# Patient Record
Sex: MALE | Race: WHITE | NOT HISPANIC OR LATINO | Employment: FULL TIME | URBAN - METROPOLITAN AREA
[De-identification: names, ages, dates, MRNs, and addresses within clinical notes are randomized per-mention and may not be internally consistent; named-entity substitution may affect disease eponyms.]

---

## 2018-05-07 ENCOUNTER — OFFICE VISIT CONVERTED (OUTPATIENT)
Dept: FAMILY MEDICINE CLINIC | Facility: CLINIC | Age: 53
End: 2018-05-07
Attending: FAMILY MEDICINE

## 2018-11-13 ENCOUNTER — OFFICE VISIT CONVERTED (OUTPATIENT)
Dept: FAMILY MEDICINE CLINIC | Facility: CLINIC | Age: 53
End: 2018-11-13
Attending: FAMILY MEDICINE

## 2018-12-21 ENCOUNTER — OFFICE VISIT CONVERTED (OUTPATIENT)
Dept: FAMILY MEDICINE CLINIC | Facility: CLINIC | Age: 53
End: 2018-12-21
Attending: FAMILY MEDICINE

## 2019-05-20 ENCOUNTER — OFFICE VISIT CONVERTED (OUTPATIENT)
Dept: FAMILY MEDICINE CLINIC | Facility: CLINIC | Age: 54
End: 2019-05-20
Attending: FAMILY MEDICINE

## 2019-05-20 ENCOUNTER — HOSPITAL ENCOUNTER (OUTPATIENT)
Dept: FAMILY MEDICINE CLINIC | Facility: CLINIC | Age: 54
Discharge: HOME | End: 2019-05-20

## 2019-05-20 LAB
ALBUMIN SERPL-MCNC: 4.3 G/DL (ref 3.5–5)
ALBUMIN/GLOB SERPL: 1.8 {RATIO} (ref 1.4–2.6)
ALP SERPL-CCNC: 85 U/L (ref 56–119)
ALT SERPL-CCNC: 18 U/L (ref 10–40)
ANION GAP SERPL CALC-SCNC: 19 MMOL/L (ref 8–19)
AST SERPL-CCNC: 18 U/L (ref 15–50)
BILIRUB SERPL-MCNC: 0.26 MG/DL (ref 0.2–1.3)
BUN SERPL-MCNC: 11 MG/DL (ref 5–25)
BUN/CREAT SERPL: 11 {RATIO} (ref 6–20)
CALCIUM SERPL-MCNC: 9.1 MG/DL (ref 8.7–10.4)
CHLORIDE SERPL-SCNC: 102 MMOL/L (ref 99–111)
CHOLEST SERPL-MCNC: 153 MG/DL (ref 107–200)
CHOLEST/HDLC SERPL: 3.4 {RATIO} (ref 3–6)
CONV CO2: 23 MMOL/L (ref 22–32)
CONV TOTAL PROTEIN: 6.7 G/DL (ref 6.3–8.2)
CREAT UR-MCNC: 1 MG/DL (ref 0.7–1.2)
GFR SERPLBLD BASED ON 1.73 SQ M-ARVRAT: >60 ML/MIN/{1.73_M2}
GLOBULIN UR ELPH-MCNC: 2.4 G/DL (ref 2–3.5)
GLUCOSE SERPL-MCNC: 105 MG/DL (ref 70–99)
HDLC SERPL-MCNC: 45 MG/DL (ref 40–60)
LDLC SERPL CALC-MCNC: 94 MG/DL (ref 70–100)
OSMOLALITY SERPL CALC.SUM OF ELEC: 290 MOSM/KG (ref 273–304)
POTASSIUM SERPL-SCNC: 4.2 MMOL/L (ref 3.5–5.3)
PSA SERPL-MCNC: 0.4 NG/ML (ref 0–4)
SODIUM SERPL-SCNC: 140 MMOL/L (ref 135–147)
TRIGL SERPL-MCNC: 71 MG/DL (ref 40–150)
VLDLC SERPL-MCNC: 14 MG/DL (ref 5–37)

## 2020-01-07 ENCOUNTER — CONVERSION ENCOUNTER (OUTPATIENT)
Dept: FAMILY MEDICINE CLINIC | Facility: CLINIC | Age: 55
End: 2020-01-07

## 2020-01-07 ENCOUNTER — OFFICE VISIT CONVERTED (OUTPATIENT)
Dept: FAMILY MEDICINE CLINIC | Facility: CLINIC | Age: 55
End: 2020-01-07
Attending: FAMILY MEDICINE

## 2020-10-02 ENCOUNTER — HOSPITAL ENCOUNTER (OUTPATIENT)
Dept: FAMILY MEDICINE CLINIC | Facility: CLINIC | Age: 55
Discharge: HOME | End: 2020-10-02
Attending: FAMILY MEDICINE

## 2020-10-02 ENCOUNTER — OFFICE VISIT CONVERTED (OUTPATIENT)
Dept: FAMILY MEDICINE CLINIC | Facility: CLINIC | Age: 55
End: 2020-10-02
Attending: FAMILY MEDICINE

## 2020-10-02 LAB
ALBUMIN SERPL-MCNC: 4.6 G/DL (ref 3.5–5)
ALBUMIN/GLOB SERPL: 1.8 {RATIO} (ref 1.4–2.6)
ALP SERPL-CCNC: 81 U/L (ref 56–119)
ALT SERPL-CCNC: 17 U/L (ref 10–40)
ANION GAP SERPL CALC-SCNC: 23 MMOL/L (ref 8–19)
AST SERPL-CCNC: 18 U/L (ref 15–50)
BASOPHILS # BLD AUTO: 0.05 10*3/UL (ref 0–0.2)
BASOPHILS # BLD: 1 % (ref 0–3)
BASOPHILS NFR BLD AUTO: 0.9 % (ref 0–3)
BILIRUB SERPL-MCNC: 0.21 MG/DL (ref 0.2–1.3)
BUN SERPL-MCNC: 14 MG/DL (ref 5–25)
BUN/CREAT SERPL: 13 {RATIO} (ref 6–20)
CALCIUM SERPL-MCNC: 9.4 MG/DL (ref 8.7–10.4)
CHLORIDE SERPL-SCNC: 100 MMOL/L (ref 99–111)
CHOLEST SERPL-MCNC: 171 MG/DL (ref 107–200)
CHOLEST/HDLC SERPL: 3.4 {RATIO} (ref 3–6)
CONV ABS BANDS: 0 % (ref 1–5)
CONV ABS IMM GRAN: 0.02 10*3/UL (ref 0–0.2)
CONV CO2: 22 MMOL/L (ref 22–32)
CONV IMMATURE GRAN: 0.4 % (ref 0–1.8)
CONV SEGMENTED NEUTROPHILS: 71 % (ref 45–70)
CONV TOTAL PROTEIN: 7.2 G/DL (ref 6.3–8.2)
CREAT UR-MCNC: 1.09 MG/DL (ref 0.7–1.2)
DEPRECATED RDW RBC AUTO: 43.9 FL (ref 35.1–43.9)
EOSINOPHIL # BLD AUTO: 0.22 10*3/UL (ref 0–0.7)
EOSINOPHIL # BLD AUTO: 4.1 % (ref 0–7)
EOSINOPHIL NFR BLD AUTO: 2 % (ref 0–7)
ERYTHROCYTE [DISTWIDTH] IN BLOOD BY AUTOMATED COUNT: 12.9 % (ref 11.6–14.4)
EST. AVERAGE GLUCOSE BLD GHB EST-MCNC: 111 MG/DL
GFR SERPLBLD BASED ON 1.73 SQ M-ARVRAT: >60 ML/MIN/{1.73_M2}
GLOBULIN UR ELPH-MCNC: 2.6 G/DL (ref 2–3.5)
GLUCOSE SERPL-MCNC: 100 MG/DL (ref 70–99)
HBA1C MFR BLD: 5.5 % (ref 3.5–5.7)
HCT VFR BLD AUTO: 45.9 % (ref 42–52)
HDLC SERPL-MCNC: 51 MG/DL (ref 40–60)
HGB BLD-MCNC: 14.7 G/DL (ref 14–18)
LDLC SERPL CALC-MCNC: 104 MG/DL (ref 70–100)
LYMPHOCYTES # BLD AUTO: 1.14 10*3/UL (ref 1–5)
LYMPHOCYTES NFR BLD AUTO: 21 % (ref 20–45)
MCH RBC QN AUTO: 29.8 PG (ref 27–31)
MCHC RBC AUTO-ENTMCNC: 32 G/DL (ref 33–37)
MCV RBC AUTO: 92.9 FL (ref 80–96)
MONOCYTES # BLD AUTO: 0.38 10*3/UL (ref 0.2–1.2)
MONOCYTES NFR BLD AUTO: 7 % (ref 3–10)
MONOCYTES NFR BLD MANUAL: 0 % (ref 3–10)
NEUTROPHILS # BLD AUTO: 3.61 10*3/UL (ref 2–8)
NEUTROPHILS NFR BLD AUTO: 66.6 % (ref 30–85)
NRBC CBCN: 0 % (ref 0–0.7)
NUC CELL # PRT MANUAL: 0 /100{WBCS}
OSMOLALITY SERPL CALC.SUM OF ELEC: 293 MOSM/KG (ref 273–304)
PLAT MORPH BLD: NORMAL
PLATELET # BLD AUTO: 190 10*3/UL (ref 130–400)
PMV BLD AUTO: 10.2 FL (ref 9.4–12.4)
POTASSIUM SERPL-SCNC: 4.2 MMOL/L (ref 3.5–5.3)
PSA SERPL-MCNC: 0.48 NG/ML (ref 0–4)
RBC # BLD AUTO: 4.94 10*6/UL (ref 4.7–6.1)
SMALL PLATELETS BLD QL SMEAR: ADEQUATE
SODIUM SERPL-SCNC: 141 MMOL/L (ref 135–147)
TRIGL SERPL-MCNC: 79 MG/DL (ref 40–150)
TSH SERPL-ACNC: 2.05 M[IU]/L (ref 0.27–4.2)
VARIANT LYMPHS NFR BLD MANUAL: 26 % (ref 20–45)
VLDLC SERPL-MCNC: 16 MG/DL (ref 5–37)
WBC # BLD AUTO: 5.42 10*3/UL (ref 4.8–10.8)

## 2020-10-03 LAB — HCV AB SER DONR QL: <0.1 S/CO RATIO (ref 0–0.9)

## 2020-12-16 ENCOUNTER — OFFICE VISIT CONVERTED (OUTPATIENT)
Dept: FAMILY MEDICINE CLINIC | Facility: CLINIC | Age: 55
End: 2020-12-16
Attending: FAMILY MEDICINE

## 2020-12-16 ENCOUNTER — HOSPITAL ENCOUNTER (OUTPATIENT)
Dept: FAMILY MEDICINE CLINIC | Facility: CLINIC | Age: 55
Discharge: HOME | End: 2020-12-16
Attending: FAMILY MEDICINE

## 2021-01-06 ENCOUNTER — CONVERSION ENCOUNTER (OUTPATIENT)
Dept: ORTHOPEDIC SURGERY | Facility: CLINIC | Age: 56
End: 2021-01-06

## 2021-01-06 ENCOUNTER — OFFICE VISIT CONVERTED (OUTPATIENT)
Dept: ORTHOPEDIC SURGERY | Facility: CLINIC | Age: 56
End: 2021-01-06
Attending: ORTHOPAEDIC SURGERY

## 2021-05-09 VITALS
WEIGHT: 200 LBS | HEART RATE: 90 BPM | DIASTOLIC BLOOD PRESSURE: 80 MMHG | SYSTOLIC BLOOD PRESSURE: 120 MMHG | TEMPERATURE: 97.9 F | OXYGEN SATURATION: 97 %

## 2021-05-09 VITALS
WEIGHT: 188 LBS | HEART RATE: 87 BPM | DIASTOLIC BLOOD PRESSURE: 84 MMHG | OXYGEN SATURATION: 96 % | SYSTOLIC BLOOD PRESSURE: 116 MMHG | TEMPERATURE: 97.6 F

## 2021-05-09 VITALS
DIASTOLIC BLOOD PRESSURE: 80 MMHG | HEART RATE: 83 BPM | OXYGEN SATURATION: 97 % | SYSTOLIC BLOOD PRESSURE: 130 MMHG | HEIGHT: 71 IN | WEIGHT: 190.25 LBS | BODY MASS INDEX: 26.64 KG/M2 | TEMPERATURE: 97.8 F

## 2021-05-09 VITALS
OXYGEN SATURATION: 96 % | WEIGHT: 198.12 LBS | HEIGHT: 71 IN | HEART RATE: 70 BPM | BODY MASS INDEX: 27.74 KG/M2 | DIASTOLIC BLOOD PRESSURE: 90 MMHG | TEMPERATURE: 97.7 F | SYSTOLIC BLOOD PRESSURE: 140 MMHG

## 2021-05-09 VITALS
SYSTOLIC BLOOD PRESSURE: 110 MMHG | OXYGEN SATURATION: 97 % | TEMPERATURE: 97.1 F | WEIGHT: 200 LBS | DIASTOLIC BLOOD PRESSURE: 70 MMHG | HEART RATE: 80 BPM

## 2021-05-09 VITALS
OXYGEN SATURATION: 97 % | HEART RATE: 106 BPM | TEMPERATURE: 98 F | SYSTOLIC BLOOD PRESSURE: 140 MMHG | WEIGHT: 199.12 LBS | DIASTOLIC BLOOD PRESSURE: 86 MMHG

## 2021-05-09 VITALS
DIASTOLIC BLOOD PRESSURE: 86 MMHG | TEMPERATURE: 97.6 F | OXYGEN SATURATION: 98 % | SYSTOLIC BLOOD PRESSURE: 132 MMHG | WEIGHT: 198 LBS | HEART RATE: 88 BPM

## 2021-05-10 NOTE — H&P
"   History and Physical      Patient Name: Pavan Perales   Patient ID: [id number]   Sex: Male   YOB: 1965    Primary Care Provider: Maria Esther Zavala MD   Referring Provider: Maria Esther Zavala MD    Visit Date: January 6, 2021    Provider: Marek Gaviria MD   Location: [facility]   Location Address: [address] [place] [address]  [id number]   Location Phone: [phone]          Chief Complaint  · Right Elbow Pain      History Of Present Illness  Pavan Perales is a 55 year old male who presents today to [facility].      Patient presents today for an evaluation of right elbow pain. Patient states he had a fall about 1 month ago resulting in right elbow pain. He has an olecranon bursitis on his elbow and states that it has only gotten \"a little bit smaller\" since sustaining right elbow injury. He denies any further complaints today.       Past Medical History  Hypertension; Pain: Elbow         Past Surgical History  *No Past Surgical History         Medication List  cyclobenzaprine 10 mg oral tablet; Norvasc 5 mg oral tablet; omeprazole 20 mg oral capsule,delayed release(DR/EC); trazodone 100 mg oral tablet         Allergy List  NO KNOWN DRUG ALLERGIES       Allergies Reconciled  Family Medical History  Hypertension         Social History  Tobacco (Former)         Review of Systems  · Constitutional  o Denies  o : fever, chills, weight loss  · Cardiovascular  o Denies  o : chest pain, shortness of breath  · Gastrointestinal  o Denies  o : liver disease, heartburn, nausea, blood in stools  · Genitourinary  o Denies  o : painful urination, blood in urine  · Integument  o Denies  o : rash, itching  · Neurologic  o Denies  o : headache, weakness, loss of consciousness  · Musculoskeletal  o Denies  o : painful, swollen joints  · Psychiatric  o Denies  o : drug/alcohol addiction, anxiety, depression      Vitals  Date Time BP Position Site L\R Cuff Size HR RR TEMP (F) WT  HT  " BMI kg/m2 BSA m2 O2 Sat FR L/min FiO2 HC       01/06/2021 09:07 AM      89 - R   190lbs 0oz 6'   25.77 2.09 99 %            Physical Examination  · Constitutional  o Appearance  o : well developed, well-nourished, no obvious deformities present  · Head and Face  o Head  o :   § Inspection  § : normocephalic  o Face  o :   § Inspection  § : no facial lesions  · Eyes  o Conjunctivae  o : conjunctivae normal  o Sclerae  o : sclerae white  · Ears, Nose, Mouth and Throat  o Ears  o :   § External Ears  § : appearance within normal limits  § Hearing  § : intact  o Nose  o :   § External Nose  § : appearance normal  · Neck  o Inspection/Palpation  o : normal appearance  o Range of Motion  o : full range of motion  · Respiratory  o Respiratory Effort  o : breathing unlabored  o Inspection of Chest  o : normal appearance  o Auscultation of Lungs  o : no audible wheezing or rales  · Cardiovascular  o Heart  o : regular rate  · Gastrointestinal  o Abdominal Examination  o : soft and non-tender  · Skin and Subcutaneous Tissue  o General Inspection  o : intact, no rashes  · Psychiatric  o General  o : Alert and oriented x3  o Judgement and Insight  o : judgment and insight intact  o Mood and Affect  o : mood normal, affect appropriate  · Right Elbow  o Inspection  o : Sensation grossly intact. Neurovascular intact. Skin intact. Olecranon bursitis on right elbow. No skin discoloration or atrophy. Good flexion and extension of the elbow. Good tone of deltoid, biceps, triceps, wrist extensors, and wrist flexors. Radial pulse 2+, ulnar pulse 2+. Negative Tinel's to the elbow. Non-tender to palpation.   · Injection Note/Aspiration Note  o Site  o : right elbow  o Procedure  o : Procedure: After educating the patient, patient gave consent for procedure. After using Chloraprep, the joint space was aspirated. The patient tolerated the procedure well.  o Medication  o : No medication  · Imaging  o Imaging  o : 12/16/20: Enthesopathic  change in the posterior olecranon. Mineralization seen along the medial epicondyle may reflect chronic medial epicondylitis/flexor tendinopathy. No acute findings.           Assessment  · Right elbow pain     719.42/M25.521  · Olecranon bursitis of right elbow     726.33/M70.21      Plan  · Orders  o Elbow-Lat Single Tendon (Injection) (53116) - - 01/06/2021   Administered by LIS Gaviria MD  · Medications  o Medications have been Reconciled  o Transition of Care or Provider Policy  · Instructions  o Dr. Gaviria saw and examined the patient and agrees with plan.   o X-rays reviewed by Dr. Gaviria.  o Reviewed the patient's Past Medical, Social, and Family history as well as the ROS at today's visit, no changes.  o Call or return if worsening symptoms.  o Follow Up PRN.  o This note was transcribed by Davina Perales. leandra  o Discussed diagnosis and treatment options with the patient. Patient will try conservative management for the time being. If olecranon bursitis bothers him further we will remove it. Patient got olecranon bursitis aspirated.   · Referrals  o ID: [id number] Date: 12/30/2020 Type: Inbound  Specialty: Orthopedic Surgery            Electronically Signed by: Davina Perales-, Other -Author on January 7, 2021 01:51:14 PM  Electronically Co-signed by: Marek Gaviria MD -Reviewer on January 7, 2021 09:39:14 PM

## 2021-05-14 VITALS — OXYGEN SATURATION: 99 % | WEIGHT: 190 LBS | BODY MASS INDEX: 25.73 KG/M2 | HEIGHT: 72 IN | HEART RATE: 89 BPM

## 2021-05-28 ENCOUNTER — OFFICE VISIT CONVERTED (OUTPATIENT)
Dept: FAMILY MEDICINE CLINIC | Facility: CLINIC | Age: 56
End: 2021-05-28
Attending: FAMILY MEDICINE

## 2021-06-05 NOTE — PROGRESS NOTES
Progress Note      Patient Name: Pavan Perales   Patient ID: [id number]   Sex: Male   YOB: 1965    Primary Care Provider: Maite Montoya DO   Referring Provider: Maite Montoya DO    Visit Date: May 28, 2021    Provider: Maite Montoya DO   Location: [facility]   Location Address: [address] [place] [address] [place]  [id number]   Location Phone: [phone]          Chief Complaint     Refills       History Of Present Illness  Pavan Perales is a 56 year old /White male who presents for evaluation and treatment of:      1.) HTN : Stable - 130/70 - no concerns with dose of rx.     2.) GERD : Stable - no new concerns for worsening GERD.     3.) INSOMNIA : Stable - no significant concern regarding rx dose.            Past Medical History  Disease Name Date Onset Notes   Back muscle spasm --  --    GERD (gastroesophageal reflux disease) --  --    History of tuberculosis --  --    Hypertension, Benign Essential --  --    Insomnia, unspecified --  --    Migraines --  --    ROCKY (obstructive sleep apnea) --  No CPAP         Past Surgical History  Procedure Name Date Notes   Low Back Disc --  --    Shoulder surgery --  --          Medication List  Name Date Started Instructions   amlodipine 5 mg oral tablet 05/28/2021 take 1 tablet (5 mg) by oral route once daily for 90 days   cyclobenzaprine 10 mg oral tablet 10/02/2020 take 1 tablet by oral route 3 times a day as needed   Protonix 40 mg oral tablet,delayed release (/EC) 05/28/2021 take 1 tablet by oral route daily for 90 days PRN   trazodone 300 mg oral tablet 05/28/2021 take 1 tablet by oral route once a day (at bedtime) for 90 days         Allergy List  Allergen Name Date Reaction Notes   NO KNOWN DRUG ALLERGIES --  --  --          Family Medical History  Disease Name Relative/Age Notes   Hypertension Father/   Father   Father, Grandfather, or Brother developed Heart Disease before the age of 65  --          Social  History  Finding Status Start/Stop Quantity Notes   Alcohol Light --/-- --  drinks alcohol, 7 or less drinks per week   Exercises regularly --  --/-- --  3-4 times per week   Second hand smoke exposure Unknown --/-- --  yes   Smokeless tobacco Current every day --/-- 1/2 CAN DAILY --    Tobacco Former --/-- --  Former smoker, now dips 1/2 can per day    Uses seatbelts --  --/-- --  yes         Review of Systems  · Constitutional  o Denies  o : fatigue, night sweats  · Eyes  o Denies  o : double vision, blurred vision  · HENT  o Denies  o : vertigo, recent head injury  · Cardiovascular  o Denies  o : chest pain, irregular heart beats  · Respiratory  o Denies  o : shortness of breath, productive cough  · Gastrointestinal  o Denies  o : nausea, vomiting  · Genitourinary  o Denies  o : dysuria, urinary retention  · Integument  o Denies  o : hair growth change, new skin lesions  · Neurologic  o Denies  o : altered mental status, seizures  · Musculoskeletal  o Denies  o : joint swelling, limitation of motion  · Endocrine  o Denies  o : cold intolerance, heat intolerance  · Psychiatric  o Denies  o : hallucinations, delusions  · Heme-Lymph  o Denies  o : petechiae, lymph node enlargement or tenderness  · Allergic-Immunologic  o Denies  o : frequent illnesses      Vitals  Date Time BP Position Site L\R Cuff Size HR RR TEMP (F) WT  HT  BMI kg/m2 BSA m2 O2 Sat FR L/min FiO2        05/28/2021 03:37 /70 Sitting    70 - R  98 190lbs 6oz    96 %            Physical Examination  · Constitutional  o Appearance  o : well developed, well-nourished, in no acute distress  · Head and Face  o HEENT  o : hearing is normal for conversation   · Eyes  o Conjunctivae  o : conjunctivae normal  o Pupils and Irises  o : pupils equal and round  · Neck  o Inspection/Palpation  o : supple  o Thyroid  o : no thyromegaly  · Respiratory  o Respiratory Effort  o : breathing unlabored  o Auscultation of Lungs  o : clear to  ascultation  · Cardiovascular  o Heart  o :   § Auscultation of Heart  § : regular rate and rhythm  o Peripheral Vascular System  o :   § Extremities  § : no edema  · Gastrointestinal  o Abdominal Examination  o :   § Abdomen  § : non-distended   · Lymphatic  o Neck  o : normal size  · Musculoskeletal  o General  o :   § General Musculoskeletal  § : muscle tone grossly intact   · Skin and Subcutaneous Tissue  o General Inspection  o : no lesions present, no areas of discoloration, skin turgor normal, texture normal  · Neurologic  o Gait and Station  o :   § Gait Screening  § : normal gait  · Psychiatric  o Mood and Affect  o : mood normal, affect appropriate          Assessment  · Essential hypertension     401.9/I10  · GERD (gastroesophageal reflux disease)     530.81/K21.9  · Insomnia, unspecified     780.52/G47.00         A.) Stable on all rx - remain on current dosages - refilled - follow up in 6 months - annual labs during that visit.            Plan  · Orders  o ACO-39: Current medications updated and reviewed (1159F, ) - - 05/28/2021  · Medications  o trazodone 300 mg oral tablet   SIG: take 1 tablet by oral route once a day (at bedtime) for 90 days   DISP: (90) Tablet with 1 refills  Adjusted on 05/28/2021     o amlodipine 5 mg oral tablet   SIG: take 1 tablet (5 mg) by oral route once daily for 90 days   DISP: (90) Tablet with 1 refills  Refilled on 05/28/2021     o Protonix 40 mg oral tablet,delayed release (DR/EC)   SIG: take 1 tablet by oral route daily for 90 days PRN   DISP: (90) Tablet with 1 refills  Refilled on 05/28/2021     o Medications have been Reconciled  o Transition of Care or Provider Policy  · Instructions  o Take all medications as prescribed/directed.  o Patient was educated/instructed on their diagnosis, treatment and medications prior to discharge from the clinic today.            Electronically Signed by: Maite Montoya DO - on May 28, 2021 04:08:29 PM

## 2021-07-15 VITALS
WEIGHT: 190.37 LBS | DIASTOLIC BLOOD PRESSURE: 70 MMHG | OXYGEN SATURATION: 96 % | HEART RATE: 70 BPM | TEMPERATURE: 98 F | SYSTOLIC BLOOD PRESSURE: 130 MMHG | BODY MASS INDEX: 25.82 KG/M2

## 2021-12-10 ENCOUNTER — OFFICE VISIT (OUTPATIENT)
Dept: FAMILY MEDICINE CLINIC | Facility: CLINIC | Age: 56
End: 2021-12-10

## 2021-12-10 VITALS
DIASTOLIC BLOOD PRESSURE: 84 MMHG | SYSTOLIC BLOOD PRESSURE: 120 MMHG | HEART RATE: 79 BPM | TEMPERATURE: 97.7 F | WEIGHT: 194 LBS | BODY MASS INDEX: 26.31 KG/M2 | OXYGEN SATURATION: 97 %

## 2021-12-10 DIAGNOSIS — S89.91XA INJURY OF RIGHT KNEE, INITIAL ENCOUNTER: Primary | ICD-10-CM

## 2021-12-10 PROBLEM — M19.90 ARTHRITIS: Status: ACTIVE | Noted: 2021-12-10

## 2021-12-10 PROBLEM — I10 HYPERTENSION: Status: ACTIVE | Noted: 2021-12-10

## 2021-12-10 PROBLEM — C80.1 CANCER: Status: ACTIVE | Noted: 2021-12-10

## 2021-12-10 PROCEDURE — 99213 OFFICE O/P EST LOW 20 MIN: CPT | Performed by: FAMILY MEDICINE

## 2021-12-10 RX ORDER — KETOROLAC TROMETHAMINE 10 MG/1
1 TABLET, FILM COATED ORAL EVERY 6 HOURS PRN
COMMUNITY
Start: 2021-12-09 | End: 2021-12-10

## 2021-12-10 RX ORDER — ACETAMINOPHEN AND CODEINE PHOSPHATE 300; 30 MG/1; MG/1
1 TABLET ORAL EVERY 4 HOURS PRN
Qty: 15 TABLET | Refills: 0 | Status: SHIPPED | OUTPATIENT
Start: 2021-12-10 | End: 2022-02-28

## 2021-12-10 RX ORDER — IBUPROFEN 800 MG/1
800 TABLET ORAL EVERY 6 HOURS PRN
Qty: 45 TABLET | Refills: 1 | Status: SHIPPED | OUTPATIENT
Start: 2021-12-10 | End: 2022-02-28

## 2021-12-10 NOTE — PROGRESS NOTES
Chief Complaint    Knee Pain (ER F/U)    Subjective      Pavan Perales presents to [facility]     History of Present Illness    1.) KNEE PAIN (RIGHT) : ER records not available during visit. Patient presents with edema, ecchymosis and knee pain. Reports a 'twisting' injury that occurred > 24 hours ago. Patient reports that he 'honestly does not remember the exact mechanism of action.' X-ray in ER - advised follow up with primary care for MRI and referral to orthopedic surgery. The patient reports medial laxity of his knee. He is wearing a knee brace, which he reports affords him some stability, if he is being 'careful.'    Objective      Vital Signs:     /84   Pulse 79   Temp 97.7 °F (36.5 °C)   Wt 88 kg (194 lb)   SpO2 97%   BMI 26.31 kg/m²       Physical Exam  Vitals reviewed.   Constitutional:       General: He is not in acute distress.     Appearance: Normal appearance. He is well-developed.   HENT:      Head: Normocephalic and atraumatic.      Right Ear: Hearing and external ear normal.      Left Ear: Hearing and external ear normal.      Nose: Nose normal.   Eyes:      General: Lids are normal.         Right eye: No discharge.         Left eye: No discharge.      Conjunctiva/sclera: Conjunctivae normal.   Pulmonary:      Effort: Pulmonary effort is normal.   Abdominal:      General: There is no distension.   Musculoskeletal:         General: No swelling.      Cervical back: Neck supple.        Legs:       Comments: Edema + ecchymosis appreciated.    Skin:     Coloration: Skin is not jaundiced.      Findings: No erythema.   Neurological:      Mental Status: He is alert. Mental status is at baseline.      Gait: Gait abnormal.   Psychiatric:         Mood and Affect: Mood and affect normal.         Thought Content: Thought content normal.     Assessment and Plan    Diagnoses and all orders for this visit:    1. Injury of right knee, initial encounter (Primary)  -      Ambulatory Referral to Orthopedic Surgery  -     acetaminophen-codeine (TYLENOL #3) 300-30 MG per tablet; Take 1 tablet by mouth Every 4 (Four) Hours As Needed for Moderate Pain .  Dispense: 15 tablet; Refill: 0    Other orders  -     ibuprofen (ADVIL,MOTRIN) 800 MG tablet; Take 1 tablet by mouth Every 6 (Six) Hours As Needed (right knee pain).  Dispense: 45 tablet; Refill: 1    · Discussion with referral regarding ordering an MRI, which they admit could take weeks to complete. Patient will be referred to orthopedic surgery. Pain control with Ibuprofen + Tylenol #3 PRN as noted below. Crutches provided - patient advised not to bear weight on right lower extremity.    Patient was given instructions and counseling regarding his condition or for health maintenance advice. Please see specific information pulled into the AVS if appropriate.

## 2021-12-16 ENCOUNTER — TRANSCRIBE ORDERS (OUTPATIENT)
Dept: ADMINISTRATIVE | Facility: HOSPITAL | Age: 56
End: 2021-12-16

## 2021-12-16 DIAGNOSIS — M25.561 RIGHT KNEE PAIN, UNSPECIFIED CHRONICITY: Primary | ICD-10-CM

## 2021-12-23 ENCOUNTER — HOSPITAL ENCOUNTER (OUTPATIENT)
Dept: MRI IMAGING | Facility: HOSPITAL | Age: 56
Discharge: HOME | End: 2021-12-23
Admitting: ORTHOPAEDIC SURGERY

## 2021-12-23 DIAGNOSIS — M25.561 RIGHT KNEE PAIN, UNSPECIFIED CHRONICITY: ICD-10-CM

## 2021-12-23 PROCEDURE — 73721 MRI JNT OF LWR EXTRE W/O DYE: CPT

## 2022-02-01 ENCOUNTER — OFFICE VISIT (OUTPATIENT)
Dept: FAMILY MEDICINE CLINIC | Facility: CLINIC | Age: 57
End: 2022-02-01

## 2022-02-01 VITALS
OXYGEN SATURATION: 98 % | HEART RATE: 77 BPM | TEMPERATURE: 98.1 F | DIASTOLIC BLOOD PRESSURE: 80 MMHG | SYSTOLIC BLOOD PRESSURE: 118 MMHG | BODY MASS INDEX: 27.4 KG/M2 | WEIGHT: 202 LBS

## 2022-02-01 DIAGNOSIS — M25.521 RIGHT ELBOW PAIN: Primary | ICD-10-CM

## 2022-02-01 DIAGNOSIS — M25.619 LIMITED RANGE OF MOTION (ROM) OF SHOULDER: ICD-10-CM

## 2022-02-01 PROCEDURE — 99213 OFFICE O/P EST LOW 20 MIN: CPT | Performed by: FAMILY MEDICINE

## 2022-02-01 NOTE — PROGRESS NOTES
Chief Complaint    Shoulder Pain (Right shoulder pain and surgery x 6 months ago. Pain worsening )    Subjective      Pavan Perales presents to [facility]    History of Present Illness    1.) SHOULDER PAIN (RIGHT) : Acute on chronic. Patient admits to several months of pain with torsion within the last 2 weeks. No recent injury. He reports significant decrease in motion especially abduction.  He notes that he has been unable to perform his ADLs secondary to his very limited range of motion and pain. He has been using Aleve with some relief.     Objective     Vital Signs:     /80   Pulse 77   Temp 98.1 °F (36.7 °C)   Wt 91.6 kg (202 lb)   SpO2 98%   BMI 27.40 kg/m²       Physical Exam  Vitals reviewed.   Constitutional:       General: He is not in acute distress.     Appearance: Normal appearance. He is well-developed.   HENT:      Head: Normocephalic and atraumatic.      Right Ear: Hearing and external ear normal.      Left Ear: Hearing and external ear normal.      Nose: Nose normal.   Eyes:      General: Lids are normal.         Right eye: No discharge.         Left eye: No discharge.      Conjunctiva/sclera: Conjunctivae normal.   Pulmonary:      Effort: Pulmonary effort is normal.   Abdominal:      General: There is no distension.   Musculoskeletal:         General: No swelling.      Cervical back: Neck supple.      Comments: Examination of right shoulder : Patient is very limited in the abduction and other planes of range of motion. Tenderness noted with passive supination and pronation. (+) Drop arm test on the right. Tenderness noted with palpation of bicipital groove.   Skin:     Coloration: Skin is not jaundiced.      Findings: No erythema.   Neurological:      Mental Status: He is alert. Mental status is at baseline.   Psychiatric:         Mood and Affect: Mood and affect normal.         Thought Content: Thought content normal.     Assessment and Plan      Diagnoses and all orders for this visit:    1. Right elbow pain (Primary)  Comments:  1.) ? Rotator cuff + bicep tendonitis. MRI ordered as noted. Relative rest. NSAID PRN. Ice. Additional recommendations per review.  Orders:  -     MRI Shoulder Right Without Contrast; Future    2. Limited range of motion (ROM) of shoulder  -     MRI Shoulder Right Without Contrast; Future    Patient was given instructions and counseling regarding his condition or for health maintenance advice. Please see specific information pulled into the AVS if appropriate.

## 2022-02-08 ENCOUNTER — HOSPITAL ENCOUNTER (OUTPATIENT)
Dept: MRI IMAGING | Facility: HOSPITAL | Age: 57
Discharge: HOME | End: 2022-02-08
Admitting: FAMILY MEDICINE

## 2022-02-08 DIAGNOSIS — M25.521 RIGHT ELBOW PAIN: ICD-10-CM

## 2022-02-08 DIAGNOSIS — M75.101 TEAR OF RIGHT SUPRASPINATUS TENDON: Primary | ICD-10-CM

## 2022-02-08 DIAGNOSIS — M25.619 LIMITED RANGE OF MOTION (ROM) OF SHOULDER: ICD-10-CM

## 2022-02-08 DIAGNOSIS — S43.431S SUPERIOR GLENOID LABRUM LESION OF RIGHT SHOULDER, SEQUELA: ICD-10-CM

## 2022-02-08 PROCEDURE — 73221 MRI JOINT UPR EXTREM W/O DYE: CPT

## 2022-02-09 ENCOUNTER — TELEPHONE (OUTPATIENT)
Dept: FAMILY MEDICINE CLINIC | Facility: CLINIC | Age: 57
End: 2022-02-09

## 2022-02-09 ENCOUNTER — TELEPHONE (OUTPATIENT)
Dept: NEUROLOGY | Facility: OTHER | Age: 57
End: 2022-02-09

## 2022-02-09 NOTE — TELEPHONE ENCOUNTER
DR. SIDHU'S OFFICE CALLED TO MAKE EMG APPT. I LET HER KNOW WE NEED ORDER FAXED OVER AND THEN WE COULD SCHEDULE.

## 2022-02-09 NOTE — TELEPHONE ENCOUNTER
So seems that he is should be already aware that he was referred to orthopedic surgery. Is his question in relation to what to do for pain management or work?    Thank you!

## 2022-02-09 NOTE — TELEPHONE ENCOUNTER
Caller: Pavan Perales    Relationship: Self    Best call back number: [phone]    What is the best time to reach you: ANYTIME    Who are you requesting to speak with (clinical staff, provider,  specific staff member): CLINICAL      What was the call regarding: THE PATIENT STATED PCP SENT HIM HIS MRI RESULTS AND HE WOULD LIKE HER RECOMMENDATION ON WHAT TO DO NEXT. HE WOULD LIKE A CALL BACK TO DISCUSS    Do you require a callback: YES

## 2022-02-16 NOTE — TELEPHONE ENCOUNTER
Patient called about referral for emg. I let him know we haven't received order yet from dr. Office but had spoke to them on 2-9-22. I gave him fax for hub so he can call them and have them resend order.

## 2022-02-21 ENCOUNTER — OFFICE VISIT (OUTPATIENT)
Dept: ORTHOPEDIC SURGERY | Facility: CLINIC | Age: 57
End: 2022-02-21

## 2022-02-21 VITALS — HEART RATE: 88 BPM | WEIGHT: 205 LBS | BODY MASS INDEX: 27.77 KG/M2 | OXYGEN SATURATION: 98 % | HEIGHT: 72 IN

## 2022-02-21 DIAGNOSIS — M19.011 ARTHRITIS OF RIGHT ACROMIOCLAVICULAR JOINT: ICD-10-CM

## 2022-02-21 DIAGNOSIS — M75.01 ADHESIVE CAPSULITIS OF RIGHT SHOULDER: Primary | ICD-10-CM

## 2022-02-21 PROCEDURE — 99213 OFFICE O/P EST LOW 20 MIN: CPT | Performed by: ORTHOPAEDIC SURGERY

## 2022-02-21 PROCEDURE — 20610 DRAIN/INJ JOINT/BURSA W/O US: CPT | Performed by: ORTHOPAEDIC SURGERY

## 2022-02-21 RX ORDER — TRIAMCINOLONE ACETONIDE 40 MG/ML
40 INJECTION, SUSPENSION INTRA-ARTICULAR; INTRAMUSCULAR
Status: COMPLETED | OUTPATIENT
Start: 2022-02-21 | End: 2022-02-21

## 2022-02-21 RX ORDER — BUPIVACAINE HYDROCHLORIDE 2.5 MG/ML
5 INJECTION, SOLUTION INFILTRATION; PERINEURAL
Status: COMPLETED | OUTPATIENT
Start: 2022-02-21 | End: 2022-02-21

## 2022-02-21 RX ADMIN — TRIAMCINOLONE ACETONIDE 40 MG: 40 INJECTION, SUSPENSION INTRA-ARTICULAR; INTRAMUSCULAR at 13:36

## 2022-02-21 RX ADMIN — BUPIVACAINE HYDROCHLORIDE 5 ML: 2.5 INJECTION, SOLUTION INFILTRATION; PERINEURAL at 13:36

## 2022-02-21 NOTE — PROGRESS NOTES
"Chief Complaint  Initial Evaluation of the Right Shoulder     Subjective      Pavan Perales presents to [facility] for an evaluation of right shoulder. Patient has a history of surgery on this shoulder. He states pain about the whole shoulder. He noticed limited motion and pain in the shoulder. He believes he injured his shoulder when he injured his knee, this was 2 months ago. He states he has been losing motion for more than 2 months. Patient present today with MRI results.     No Known Allergies     Social History     Socioeconomic History   • Marital status:    Tobacco Use   • Smoking status: Never Smoker   • Smokeless tobacco: Current User   Vaping Use   • Vaping Use: Never used   Substance and Sexual Activity   • Alcohol use: Yes     Comment: RARELY        Review of Systems     Objective   Vital Signs:   Pulse 88   Ht 182.9 cm (72\")   Wt 93 kg (205 lb)   SpO2 98%   BMI 27.80 kg/m²       Physical Exam  Constitutional:       Appearance: Normal appearance. Patient is well-developed and normal weight.   HENT:      Head: Normocephalic.      Right Ear: Hearing and external ear normal.      Left Ear: Hearing and external ear normal.      Nose: Nose normal.   Eyes:      Conjunctiva/sclera: Conjunctivae normal.   Cardiovascular:      Rate and Rhythm: Normal rate.   Pulmonary:      Effort: Pulmonary effort is normal.      Breath sounds: No wheezing or rales.   Abdominal:      Palpations: Abdomen is soft.      Tenderness: There is no abdominal tenderness.   Musculoskeletal:      Cervical back: Normal range of motion.   Skin:     Findings: No rash.   Neurological:      Mental Status: Patient  is alert and oriented to person, place, and time.   Psychiatric:         Mood and Affect: Mood and affect normal.         Judgment: Judgment normal.       Ortho Exam      RIGHT SHOULDER: Forward elevation to 120 degrees. IR to Si joint. Painful empty can testing. Pain with pronation and " supination. Tender anterior shoulder. Good tone of deltoid, biceps, triceps, wrist extensors, and wrist flexors.  Sensation grossly intact. Neurovascular intact.  Radial pulse 2+, ulnar pulse 2+. No swelling, skin discoloration or atrophy.       Large Joint Arthrocentesis: R subacromial bursa  Date/Time: 2/21/2022 1:36 PM  Consent given by: patient  Site marked: site marked  Timeout: Immediately prior to procedure a time out was called to verify the correct patient, procedure, equipment, support staff and site/side marked as required   Supporting Documentation  Indications: pain   Procedure Details  Location: shoulder - R subacromial bursa  Needle size: 22 G  Medications administered: 5 mL bupivacaine 0.25 %; 40 mg triamcinolone acetonide 40 MG/ML  Patient tolerance: patient tolerated the procedure well with no immediate complications              Imaging Results (Most Recent)     None           Result Review :         MRI Shoulder Right Without Contrast    Result Date: 2/8/2022  Narrative: PROCEDURE: MRI SHOULDER RIGHT WO CONTRAST  COMPARISON: None  INDICATIONS: Limited ROM in all planes, (+) drop arm test, pain with palpation of bicipital groove, passive supination and passive pronation      TECHNIQUE: A variety of imaging planes and parameters were utilized for visualization of suspected pathology.  Images were performed without contrast.   FINDINGS:  No fracture or malalignment is seen.  Marrow signal appears normal.  Moderate acromioclavicular osteoarthritis is noted.  There is a small intrasubstance tear of the distal supraspinatus tendon at the footprint.  There is mild intermediate signal abnormality in the distal subscapularis tendon consistent with tendinopathy.  The rotator cuff otherwise appears unremarkable.  No muscle body atrophy is seen.  The biceps long head tendon and its attachment to the superior labrum are intact.  There is a tear of the superior labrum extending anterior and posterior to the  biceps anchor.  The tear extends to the posterosuperior labrum.  The labrum otherwise appears unremarkable.  Cartilage in the glenohumeral joint appears intact.       Impression:   1. Moderate acromioclavicular osteoarthritis 2. Small intrasubstance tear of the distal supraspinatus tendon at the footprint 3. Mild subscapularis tendinopathy 4. Slap tear extending to the posterosuperior labrum      Lamine Adair M.D.       Electronically Signed and Approved By: Lamine Adair M.D. on 2/08/2022 at 17:23                      Assessment and Plan     DX: AC joint arthritis, right   Right frozen shoulder     Discussed treatment plans and diagnosis with the patient. He has a frozen shoulder and his MRI revealed no rotator cuff tears. Discussed home exercises and therapy to help with the frozen shoulder. A prescription for PT provided as well as home exercises. A right shoulder injection given and tolerated this well. Discussed manipulation if patient fails therapy and injection.     Call or return if worsening symptoms.    Follow Up     4-6 weeks.       Patient was given instructions and counseling regarding his condition or for health maintenance advice. Please see specific information pulled into the AVS if appropriate.     Scribed for Marek Gaviria MD by Davina Perales.  02/21/22   13:29 EST      I have personally performed the services described in this document as scribed by the above individual and it is both accurate and complete. Marek Gaviria MD 02/21/22

## 2022-03-18 ENCOUNTER — TREATMENT (OUTPATIENT)
Dept: PHYSICAL THERAPY | Facility: CLINIC | Age: 57
End: 2022-03-18

## 2022-03-18 DIAGNOSIS — M75.01 ADHESIVE CAPSULITIS OF RIGHT SHOULDER: Primary | ICD-10-CM

## 2022-03-18 PROCEDURE — 97110 THERAPEUTIC EXERCISES: CPT | Performed by: PHYSICAL THERAPIST

## 2022-03-18 PROCEDURE — 97161 PT EVAL LOW COMPLEX 20 MIN: CPT | Performed by: PHYSICAL THERAPIST

## 2022-03-18 NOTE — PROGRESS NOTES
Physical Therapy Initial Evaluation and Plan of Care      Patient: Pavan Perales   : 1965  Diagnosis/ICD-10 Code:  Adhesive capsulitis of right shoulder [M75.01]  Referring practitioner: Marek Gaviria MD  Date of Initial Visit: 3/18/2022  Today's Date: 3/18/2022  Patient seen for 1 sessions           Subjective Questionnaire: QuickDASH: 13=1-19% limitation      Subjective Evaluation    History of Present Illness  Mechanism of injury: Patient is a 57 yr old male referred to physical therapy with diagnosis of adhesive capsulitis of right shoulder.  Patient states right shoulder feeling better now with using right arm/stretching right shoulder.  Patient states had arthroscopic surgery on right shoulder approx 5 yrs ago.  Patient is right hand dominate.    Pain  Current pain ratin  At worst pain rating: 3  Location: when stretching right shoulder  Quality: sharp  Relieving factors: change in position    Patient Goals  Patient goals for therapy: increased motion and decreased pain             Objective          Active Range of Motion   Left Shoulder   Internal rotation BTB: T7     Right Shoulder   Flexion: WFL  Abduction: WFL  External rotation BTH: WFL  Internal rotation BTB: T12     Strength/Myotome Testing     Right Shoulder     Planes of Motion   Flexion: WFL   Abduction: 4+   External rotation at 0°: 4   Internal rotation at 0°: WFL           Assessment & Plan     Assessment  Impairments: lacks appropriate home exercise program and pain with function  Functional Limitations: uncomfortable because of pain and reaching behind back  Assessment details: Pt presents with limitations, noted by evaluation that impede patient's ability to use right shoulder for ADLs.  The skills of a therapist will be required to safely and effectively implement the following treatment plan to restore maximal level of function.    Prognosis: good    Goals  Plan Goals: 1. The patient has limited ROM of the right shoulder.     LTG 1: 4 weeks:  The patient will demonstrate right shoulder internal rotation to T7in order to reach behind the back with greater ease.   STATUS:  New   STG 1a: 2 weeks:  The patient will demonstrate independence and compliance with home exercise program.  STATUS:  New      2. The patient has limited strength of the right shoulder.   LTG 2: 4 weeks:  The patient will demonstrate 5/5 strength for right shoulder abduction and external rotation in order to demonstrate improved shoulder stability.   STATUS:  New   STG 2a: 2 weeks:  The patient will demonstrate 4+/5 strength for right shoulder external rotation.  STATUS:  New      3. The patient complains of pain to the right shoulder.   LTG 3: 4 weeks:  The patient will report a pain rating of 1/10 or better in order to improve sleep quality and tolerance to performance of activities of daily living.   STATUS:  New   STG 3a: 2 weeks:  The patient will report a pain rating of 2/10 or better.    STATUS:  New      4. Carrying, Moving, and Handling Objects Functional Limitation     LTG 4: 4 weeks:  The patient will demonstrate 0% limitation by achieving a score of 11 on the Quick DASH.   STATUS:  New        TREATMENT: Manual therapy, therapeutic exercise, home exercise instruction, and modalities as needed to include: electrical stimulation, ultrasound, moist heat, and ice.       Plan  Therapy options: will be seen for skilled therapy services  Planned modality interventions: TENS and cryotherapy  Planned therapy interventions: manual therapy, strengthening, stretching, soft tissue mobilization, spinal/joint mobilization, home exercise program, flexibility and functional ROM exercises  Frequency: 2x week  Duration in weeks: 4  Treatment plan discussed with: patient  Plan details: Patient states would like to continue with home exercise program at this time and will call within next 30 days if would like to return to therapy for further treatment.      History # of  Personal Factors and/or Comorbidities: LOW (0)  Examination of Body System(s): # of elements: LOW (1-2)  Clinical Presentation: STABLE   Clinical Decision Making: LOW       Visit Diagnoses:    ICD-10-CM ICD-9-CM   1. Adhesive capsulitis of right shoulder  M75.01 726.0       Timed:  Manual Therapy:         mins  50298;  Therapeutic Exercise:    9     mins  12302;     Neuromuscular Flower:        mins  19453;    Therapeutic Activity:          mins  64845;     Gait Training:           mins  23705;     Ultrasound:          mins  47413;    Electrical Stimulation:         mins  44878 ( );    Untimed:  Electrical Stimulation:         mins  60004 ( );  Mechanical Traction:         mins  27720;   PT evaluation   20 mins    Timed Treatment:   9   mins   Total Treatment:     29   mins    PT SIGNATURE: Jenifer Girard PT     Electronically singed 3/18/2022    [place] PT license: [id number]        Initial Certification  Certification Period: 3/18/2022 thru 6/15/2022  I certify that the therapy services are furnished while this patient is under my care.  The services outlined above are required by this patient, and will be reviewed every 90 days.    PHYSICIAN: Marek aGviria MD  NPI: [id number]                                      DATE:     Please sign and return via fax to [phone]  Thank you, [facility].

## 2022-04-12 ENCOUNTER — TRANSCRIBE ORDERS (OUTPATIENT)
Dept: PHYSICAL THERAPY | Facility: CLINIC | Age: 57
End: 2022-04-12

## 2022-04-12 ENCOUNTER — TREATMENT (OUTPATIENT)
Dept: PHYSICAL THERAPY | Facility: CLINIC | Age: 57
End: 2022-04-12

## 2022-04-12 DIAGNOSIS — Z98.890 S/P MCL REPAIR: ICD-10-CM

## 2022-04-12 DIAGNOSIS — M25.561 RIGHT KNEE PAIN, UNSPECIFIED CHRONICITY: Primary | ICD-10-CM

## 2022-04-12 DIAGNOSIS — Z47.89 AFTERCARE FOLLOWING SURGERY OF THE MUSCULOSKELETAL SYSTEM: Primary | ICD-10-CM

## 2022-04-12 DIAGNOSIS — Z98.890 S/P ACL RECONSTRUCTION: ICD-10-CM

## 2022-04-12 DIAGNOSIS — R26.9 GAIT DISTURBANCE: ICD-10-CM

## 2022-04-12 PROCEDURE — 97110 THERAPEUTIC EXERCISES: CPT | Performed by: PHYSICAL THERAPIST

## 2022-04-12 PROCEDURE — 97161 PT EVAL LOW COMPLEX 20 MIN: CPT | Performed by: PHYSICAL THERAPIST

## 2022-04-12 NOTE — PROGRESS NOTES
Physical Therapy Initial Evaluation and Plan of Care      Patient: Pavan Perales   : 1965  Diagnosis/ICD-10 Code:  Right knee pain, unspecified chronicity [M25.561]  Referring practitioner: Stevan Elizabeth MD  Date of Initial Visit: 2022  Today's Date: 2022  Patient seen for 1 sessions           Subjective Questionnaire: LEFS: =75% limitation      Subjective Evaluation    History of Present Illness  Mechanism of injury: Patient is 57 yr old referred to physical therapy s/p R ACL and MCL repair 22. Patient using bilateral crutches/knee brace locked in extension, and cold pack around knee in place. Patient states having 5-6/10 pain in right knee.     Pain  Current pain ratin  Location: Right knee  Quality: dull ache, tight and pressure  Relieving factors: ice, change in position, medications and rest    Patient Goals  Patient goals for therapy: increased strength, increased motion, improved balance, decreased pain, independence with ADLs/IADLs and return to work             Objective          Passive Range of Motion     Right Knee   Flexion: 20 degrees   Extension: 8 degrees     Additional Passive Range of Motion Details  Patient unable to achieve neutral extension.    Patellar Mobility     Right Knee Hypomobile in the medial, lateral, superior and inferior patellar tendon(s).     Strength/Myotome Testing     Additional Strength Details  Right LE not tested secondary to recent surgery.          Assessment & Plan     Assessment  Impairments: abnormal gait, abnormal muscle tone, abnormal or restricted ROM, activity intolerance, impaired balance, impaired physical strength, lacks appropriate home exercise program, pain with function and weight-bearing intolerance  Functional Limitations: sleeping, walking, uncomfortable because of pain, moving in bed, sitting, standing, stooping and unable to perform repetitive tasks  Assessment details: Pt presents with limitations, noted by  evaluation that impede patient's ability to ambulate/functional mobility.  The skills of a therapist will be required to safely and effectively implement the following treatment plan to restore maximal level of function.    Prognosis: good    Goals  Plan Goals: 1. The patient has limited ROM of the right knee.   LTG 1: 12 weeks:  The patient will demonstrate 0 to 120 degrees of ROM for the right knee in order to allow patient to normalize gait.   STATUS:  New   STG 1a: 6 weeks:  The patient will demonstrate 0 to 110 degrees of ROM for the right knee.   STATUS:  New      2. The patient has limited strength of the right knee.   LTG 2: 12  weeks: The patient will demonstrate 4+/5 strength for right knee flexion and extension in order to allow patient improved joint stability.   STATUS:  New   STG 2a: 6 weeks: The patient will demonstrate 4/5 strength for right knee flexion and extension   STATUS:  New       3. The patient has gait dysfunction.   LTG 3: 6 weeks:  The patient will ambulate without assistive device, independently, for community distances with minimal limp to the right lower extremity in order to improve mobility and allow patient to perform activities such as grocery shopping with greater ease.   STATUS:  New   STG 3a: The patient will be independent in HEP.   STATUS:  New      4. The patient complains of right knee pain.   LTG 4: 12 weeks:  The patient will report a pain rating of 2/10 or better in order to improve   tolerance to activities of daily living and improve sleep quality.   STATUS:  New   STG 4a: 6 weeks:  The patient will report a pain rating of 4/10 or better.   STATUS:  New      5. Mobility: Walking/Moving Around Functional Limitation     LTG 5: 12weeks:  The patient will demonstrate 25% limitation by achieving a score of 60/80 on the Lower Extremity Functional Scale.   STATUS:  New   STG 5a: 6 weeks:  The patient will demonstrate 50% limitation by achieving a score of 40/80 on the Lower  Extremity Functional Scale.     STATUS:  New     TREATMENT: Manual therapy, gait training, neuromuscular re-education, therapeutic exercise, home exercise instruction, and modalities as needed to include:  moist heat, electrical stimulation, ultrasound, and ice.       Plan  Therapy options: will be seen for skilled therapy services  Planned modality interventions: cryotherapy, electrical stimulation/Russian stimulation and TENS  Planned therapy interventions: manual therapy, soft tissue mobilization, spinal/joint mobilization, strengthening, stretching, therapeutic activities, transfer training, home exercise program, gait training, functional ROM exercises, flexibility and balance/weight-bearing training  Frequency: 2x week  Duration in weeks: 12  Treatment plan discussed with: patient      History # of Personal Factors and/or Comorbidities: LOW (0)  Examination of Body System(s): # of elements: LOW (1-2)  Clinical Presentation: STABLE   Clinical Decision Making: LOW       Visit Diagnoses:    ICD-10-CM ICD-9-CM   1. Right knee pain, unspecified chronicity  M25.561 719.46       Timed:  Manual Therapy:         mins  49476;  Therapeutic Exercise:    9     mins  99039;     Neuromuscular Flower:        mins  90454;    Therapeutic Activity:          mins  25063;     Gait Training:           mins  30287;     Ultrasound:          mins  36142;    Electrical Stimulation:         mins  17461 ( );    Untimed:  Electrical Stimulation:         mins  54726 ( );  Mechanical Traction:         mins  52777;   PT evaluation   30 mins    Timed Treatment:   9   mins   Total Treatment:     39   mins    PT SIGNATURE: Jenifer Girard PT     Electronically singed 4/12/2022    [place] PT license: [id number]        Initial Certification  Certification Period: 4/12/2022 thru 7/10/2022  I certify that the therapy services are furnished while this patient is under my care.  The services outlined above are required by this patient, and  will be reviewed every 90 days.    PHYSICIAN: Stevan Elizabeth MD  NPI: [id number]                                      DATE:     Please sign and return via fax to [phone]  Thank you, [facility].

## 2022-04-15 ENCOUNTER — TREATMENT (OUTPATIENT)
Dept: PHYSICAL THERAPY | Facility: CLINIC | Age: 57
End: 2022-04-15

## 2022-04-15 DIAGNOSIS — Z98.890 S/P ACL RECONSTRUCTION: ICD-10-CM

## 2022-04-15 DIAGNOSIS — Z98.890 S/P MCL REPAIR: ICD-10-CM

## 2022-04-15 DIAGNOSIS — M25.561 RIGHT KNEE PAIN, UNSPECIFIED CHRONICITY: Primary | ICD-10-CM

## 2022-04-15 DIAGNOSIS — R26.9 GAIT DISTURBANCE: ICD-10-CM

## 2022-04-15 PROCEDURE — 97110 THERAPEUTIC EXERCISES: CPT | Performed by: PHYSICAL THERAPIST

## 2022-04-15 NOTE — PROGRESS NOTES
Physical Therapy Daily Treatment Note      Patient: Pavan Perales   : 1965  Referring practitioner: Stevan Elizabeth MD  Date of Initial Visit: Type: THERAPY  Noted: 2022  Today's Date: 4/15/2022  Patient seen for 2 sessions           Subjective   Pavan Perales reports: right knee pain 4/10 with pain medication      Objective   See Exercise, Manual, and Modality Logs for complete treatment.       Assessment & Plan     Assessment  Prognosis details: Supine R knee AROM flexion 60 degrees and AAROM 70 degrees.  Patient able to ambulate without crutches today.        Visit Diagnoses:    ICD-10-CM ICD-9-CM   1. Right knee pain, unspecified chronicity  M25.561 719.46   2. S/P ACL reconstruction  Z98.890 V45.89   3. Gait disturbance  R26.9 781.2   4. S/P MCL repair  Z98.890 V45.89       Progress strengthening /stabilization /functional activity           Timed:  Manual Therapy:         mins  72457;  Therapeutic Exercise:    30     mins  24559;     Neuromuscular Flower:        mins  00417;    Therapeutic Activity:          mins  65543;     Gait Training:           mins  36550;     Ultrasound:          mins  37986;    Electrical Stimulation:         mins  69004 ( );    Untimed:  Electrical Stimulation:         mins  77507 ( );  Mechanical Traction:         mins  12906;     Timed Treatment:   30   mins   Total Treatment:     30   mins  Jenifer Girard PT    Electronically singed 4/15/2022      [place] PT license: [id number]  Physical Therapist

## 2022-04-20 ENCOUNTER — TREATMENT (OUTPATIENT)
Dept: PHYSICAL THERAPY | Facility: CLINIC | Age: 57
End: 2022-04-20

## 2022-04-20 DIAGNOSIS — R26.9 GAIT DISTURBANCE: ICD-10-CM

## 2022-04-20 DIAGNOSIS — Z98.890 S/P ACL RECONSTRUCTION: ICD-10-CM

## 2022-04-20 DIAGNOSIS — M25.561 RIGHT KNEE PAIN, UNSPECIFIED CHRONICITY: Primary | ICD-10-CM

## 2022-04-20 DIAGNOSIS — Z98.890 S/P MCL REPAIR: ICD-10-CM

## 2022-04-20 PROCEDURE — 97110 THERAPEUTIC EXERCISES: CPT | Performed by: PHYSICAL THERAPIST

## 2022-04-20 PROCEDURE — 97530 THERAPEUTIC ACTIVITIES: CPT | Performed by: PHYSICAL THERAPIST

## 2022-04-20 NOTE — PROGRESS NOTES
Physical Therapy Daily Treatment Note      Patient: Pavan Perales   : 1965  Referring practitioner: Stevan Elizabeth MD  Date of Initial Visit: Type: THERAPY  Noted: 2022  Today's Date: 2022  Patient seen for 3 sessions           Subjective   Pavan Perales reports: went to MD and received new knee brace (unlocked) and states MD informed him he could walk in the house without brace on.     Objective   See Exercise, Manual, and Modality Logs for complete treatment.       Assessment & Plan     Assessment  Prognosis details: Patient reports great weakness in prone TKE. Therapist educated patient to wear brace on right knee with exercises and not to progress activity too quickly secondary to repair only 1 wk. Patient tolerated progression of strengthening exercises.        Visit Diagnoses:    ICD-10-CM ICD-9-CM   1. Right knee pain, unspecified chronicity  M25.561 719.46   2. S/P ACL reconstruction  Z98.890 V45.89   3. Gait disturbance  R26.9 781.2   4. S/P MCL repair  Z98.890 V45.89       Progress strengthening /stabilization /functional activity           Timed:  Manual Therapy:         mins  54894;  Therapeutic Exercise:    30     mins  50964;     Neuromuscular Flower:        mins  55531;    Therapeutic Activity:     9     mins  66262;     Gait Training:           mins  67382;     Ultrasound:          mins  98991;    Electrical Stimulation:         mins  97831 ( );    Untimed:  Electrical Stimulation:         mins  41034 ( );  Mechanical Traction:         mins  87987;     Timed Treatment:  39    mins   Total Treatment:     39   mins  Jenifer Girard PT    Electronically singed 2022      [place] PT license: [id number]  Physical Therapist

## 2022-04-22 ENCOUNTER — TREATMENT (OUTPATIENT)
Dept: PHYSICAL THERAPY | Facility: CLINIC | Age: 57
End: 2022-04-22

## 2022-04-22 DIAGNOSIS — Z98.890 S/P MCL REPAIR: ICD-10-CM

## 2022-04-22 DIAGNOSIS — Z98.890 S/P ACL RECONSTRUCTION: ICD-10-CM

## 2022-04-22 DIAGNOSIS — M25.561 RIGHT KNEE PAIN, UNSPECIFIED CHRONICITY: Primary | ICD-10-CM

## 2022-04-22 DIAGNOSIS — R26.9 GAIT DISTURBANCE: ICD-10-CM

## 2022-04-22 PROCEDURE — 97110 THERAPEUTIC EXERCISES: CPT | Performed by: PHYSICAL THERAPIST

## 2022-04-22 PROCEDURE — 97530 THERAPEUTIC ACTIVITIES: CPT | Performed by: PHYSICAL THERAPIST

## 2022-04-22 NOTE — PROGRESS NOTES
Physical Therapy Daily Treatment Note      Patient: Pavan Perales   : 1965  Referring practitioner: Stevan Elizabeth MD  Date of Initial Visit: Type: THERAPY  Noted: 2022  Today's Date: 2022  Patient seen for 4 sessions           Subjective   Pavan Perales reports: fatigued after last session.       Objective   See Exercise, Manual, and Modality Logs for complete treatment.       Assessment & Plan     Assessment  Prognosis details: Patient reports tolerated therapy session without increase in symptoms. Patient continues to progress toward goals with decrease antalgic gait and improved right quad strength.         Visit Diagnoses:    ICD-10-CM ICD-9-CM   1. Right knee pain, unspecified chronicity  M25.561 719.46   2. S/P ACL reconstruction  Z98.890 V45.89   3. Gait disturbance  R26.9 781.2   4. S/P MCL repair  Z98.890 V45.89       Progress strengthening /stabilization /functional activity           Timed:  Manual Therapy:         mins  96468;  Therapeutic Exercise:    20     mins  78854;     Neuromuscular Flower:        mins  93573;    Therapeutic Activity:      8    mins  84170;     Gait Training:           mins  93293;     Ultrasound:          mins  84779;    Electrical Stimulation:         mins  42626 ( );    Untimed:  Electrical Stimulation:         mins  88482 ( );  Mechanical Traction:         mins  65756;     Timed Treatment:   28   mins   Total Treatment:     28   mins  Jenifer Girard PT    Electronically singed 2022      [place] PT license: [id number]  Physical Therapist

## 2022-04-27 ENCOUNTER — TREATMENT (OUTPATIENT)
Dept: PHYSICAL THERAPY | Facility: CLINIC | Age: 57
End: 2022-04-27

## 2022-04-27 DIAGNOSIS — Z98.890 S/P MCL REPAIR: ICD-10-CM

## 2022-04-27 DIAGNOSIS — Z98.890 S/P ACL RECONSTRUCTION: ICD-10-CM

## 2022-04-27 DIAGNOSIS — R26.9 GAIT DISTURBANCE: ICD-10-CM

## 2022-04-27 DIAGNOSIS — M25.561 RIGHT KNEE PAIN, UNSPECIFIED CHRONICITY: Primary | ICD-10-CM

## 2022-04-27 PROCEDURE — G0283 ELEC STIM OTHER THAN WOUND: HCPCS | Performed by: PHYSICAL THERAPIST

## 2022-04-27 PROCEDURE — 97110 THERAPEUTIC EXERCISES: CPT | Performed by: PHYSICAL THERAPIST

## 2022-04-27 NOTE — PROGRESS NOTES
Physical Therapy Daily Progress Note        Patient: Pavan Perales   : 1965  Diagnosis/ICD-10 Code:  Right knee pain, unspecified chronicity [M25.561]  Referring practitioner: Stevan Elizabeth MD  Date of Initial Visit: Type: THERAPY  Noted: 2022  Today's Date: 2022  Patient seen for 5 sessions             Subjective   Pavan Perales denies having any -pain in his right knee.  He reports that he has returned to work and has been driving..    Objective     Right Knee AROM:  Flexion:  110 degrees  Extension:  0 degrees    See Exercise, Manual, and Modality Logs for complete treatment.       Assessment/Plan     Pt tolerated therapy session well - with progression of therapeutic exercises, CKC-Functional activities, and Manual therapy. He has improved, but continues to have deficits in Right Knee ROM,  Strength, and Stability; limiting function and ability to perform ADLs at this time.  He continues to exhibit improved right knee ROM and Strength.  He demonstrates improved Quad Contraction and is now able to perform SLR without any extensor lag observed.  Pt denied any pain pre,during, or post session today.  Mild edema persists.  Pt opted to ice at home after therapy session.  Continued to caution pt against doing to much - too soon.    Progress per Plan of Care - as tolerated - as per Protocol           Timed:  Manual Therapy:    5     mins  27030;  Therapeutic Exercise:    25     mins  52878;     Neuromuscular Flower:    0    mins  45692;    Therapeutic Activity:     0     mins  93985;     Gait Trainin     mins  22679;     Ultrasound:     0     mins  56565;    Electrical Stimulation:    0     mins  62563;  Iontophoresis     0     mins  00137    Untimed:  Electrical Stimulation:    15     mins  65950 ( );  Mechanical Traction:    0     mins  30789;   Fluidotherapy     0     mins  91085  Hot pack     0     mins  02134  Cold pack     0     mins  92457    Timed Treatment:   30    mins   Total Treatment:     45   mins        Ashlee Orantes PTA  Physical Therapist Assistant

## 2022-04-29 ENCOUNTER — TREATMENT (OUTPATIENT)
Dept: PHYSICAL THERAPY | Facility: CLINIC | Age: 57
End: 2022-04-29

## 2022-04-29 DIAGNOSIS — M25.561 RIGHT KNEE PAIN, UNSPECIFIED CHRONICITY: Primary | ICD-10-CM

## 2022-04-29 DIAGNOSIS — Z98.890 S/P ACL RECONSTRUCTION: ICD-10-CM

## 2022-04-29 DIAGNOSIS — Z98.890 S/P MCL REPAIR: ICD-10-CM

## 2022-04-29 DIAGNOSIS — R26.9 GAIT DISTURBANCE: ICD-10-CM

## 2022-04-29 PROCEDURE — 97110 THERAPEUTIC EXERCISES: CPT | Performed by: PHYSICAL THERAPIST

## 2022-04-29 PROCEDURE — 97530 THERAPEUTIC ACTIVITIES: CPT | Performed by: PHYSICAL THERAPIST

## 2022-04-29 PROCEDURE — G0283 ELEC STIM OTHER THAN WOUND: HCPCS | Performed by: PHYSICAL THERAPIST

## 2022-04-29 NOTE — PROGRESS NOTES
Physical Therapy Daily Treatment Note      Patient: Pavan Perales   : 1965  Referring practitioner: Stevan Elizabeth MD  Date of Initial Visit: Type: THERAPY  Noted: 2022  Today's Date: 2022  Patient seen for 6 sessions           Subjective   Pavan Perales reports: right knee 2-3/10      Objective   See Exercise, Manual, and Modality Logs for complete treatment.       Assessment & Plan     Assessment  Prognosis details: Patient tolerating session well, still noted quad weakness; sha. Eccentric contraction (descending stairs) and difficulty with prone TKE.         Visit Diagnoses:    ICD-10-CM ICD-9-CM   1. Right knee pain, unspecified chronicity  M25.561 719.46   2. S/P ACL reconstruction  Z98.890 V45.89   3. Gait disturbance  R26.9 781.2   4. S/P MCL repair  Z98.890 V45.89       Progress per Plan of Care           Timed:  Manual Therapy:         mins  98329;  Therapeutic Exercise:    20     mins  23155;     Neuromuscular Flower:        mins  29782;    Therapeutic Activity:     10     mins  20634;     Gait Training:           mins  79430;     Ultrasound:          mins  75167;    Electrical Stimulation:         mins  50309 ( );    Untimed:  Electrical Stimulation:     10    mins  28348 ( );  Mechanical Traction:         mins  34250;     Timed Treatment:   30   mins   Total Treatment:     40   mins  Jenifer Girard PT    Electronically singed 2022      [place] PT license: [id number]  Physical Therapist

## 2022-05-04 NOTE — PROGRESS NOTES
Physical Therapy Daily Progress Note        Patient: Pavan Perales   : 1965  Diagnosis/ICD-10 Code:  Right knee pain, unspecified chronicity [M25.561]  Referring practitioner: Stevan Elizabeth MD  Date of Initial Visit: Type: THERAPY  Noted: 2022  Today's Date: 2022  Patient seen for 7 sessions             Subjective   Pavan Perales denies having any pain in his right knee.  He reports that he has been going without his brace at times and voices no complaints at this time.    Objective      Knee measurements:  AROM:   Left   Right  Flexion:   135  120  Extension:   0  0      PROM:    Left   Right  Flexion:   135  120  Extension:   0  0    Strength:   Left   Right  Hip Flexion:   5/5  5/5  Hip Abduction:   5/5  4+/5  Hip Adduction:                        4+/5                  4/5  Hip Extension:   4+/5   4+/5    Knee flexion   5/5   4/5  Knee extension  5/5   4/5          See Exercise, Manual, and Modality Logs for complete treatment.       Assessment/Plan     Pt has tolerated therapy sessions well - with progression of therapeutic exercises, CKC-Functional activities, and Manual therapy. He continues to make steady gains in Right Knee ROM, Strength, and Stability; progressing according to protocol.  He  exhibist good Quad activation and is now able to perform SLR without any extensor lag observed.  Pt denies any pain pre,during, or post sessions.  Mild edema noted at Right knee.      See Progress Note to MD for follow-up with Orthopedic 5/10/22           Progress per Plan of Care           Timed:  Manual Therapy:    7     mins  20659;  Therapeutic Exercise:    23     mins  48728;     Neuromuscular Flower:    0    mins  69266;    Therapeutic Activity:     10     mins  55844;     Gait Trainin     mins  13494;     Ultrasound:     0     mins  16478;    Electrical Stimulation:    0     mins  36372;  Iontophoresis     0     mins  17479    Untimed:  Electrical Stimulation:    0     mins   30076 ( );  Mechanical Traction:    0     mins  69224;   Fluidotherapy     0     mins  67162  Hot pack     0     mins  13782  Cold pack     0     mins  13453    Timed Treatment:   40   mins   Total Treatment:     40   mins        Ashlee Orantes PTA  Physical Therapist Assistant

## 2022-05-06 ENCOUNTER — TREATMENT (OUTPATIENT)
Dept: PHYSICAL THERAPY | Facility: CLINIC | Age: 57
End: 2022-05-06

## 2022-05-06 DIAGNOSIS — M25.561 RIGHT KNEE PAIN, UNSPECIFIED CHRONICITY: Primary | ICD-10-CM

## 2022-05-06 DIAGNOSIS — Z98.890 S/P ACL RECONSTRUCTION: ICD-10-CM

## 2022-05-06 DIAGNOSIS — R26.9 GAIT DISTURBANCE: ICD-10-CM

## 2022-05-06 PROCEDURE — 97530 THERAPEUTIC ACTIVITIES: CPT | Performed by: PHYSICAL THERAPIST

## 2022-05-06 PROCEDURE — 97110 THERAPEUTIC EXERCISES: CPT | Performed by: PHYSICAL THERAPIST

## 2022-05-11 ENCOUNTER — TREATMENT (OUTPATIENT)
Dept: PHYSICAL THERAPY | Facility: CLINIC | Age: 57
End: 2022-05-11

## 2022-05-11 DIAGNOSIS — R26.9 GAIT DISTURBANCE: ICD-10-CM

## 2022-05-11 DIAGNOSIS — Z98.890 S/P ACL RECONSTRUCTION: ICD-10-CM

## 2022-05-11 DIAGNOSIS — M25.561 RIGHT KNEE PAIN, UNSPECIFIED CHRONICITY: Primary | ICD-10-CM

## 2022-05-11 DIAGNOSIS — Z98.890 S/P MCL REPAIR: ICD-10-CM

## 2022-05-11 PROCEDURE — G0283 ELEC STIM OTHER THAN WOUND: HCPCS | Performed by: PHYSICAL THERAPIST

## 2022-05-11 PROCEDURE — 97140 MANUAL THERAPY 1/> REGIONS: CPT | Performed by: PHYSICAL THERAPIST

## 2022-05-11 PROCEDURE — 97110 THERAPEUTIC EXERCISES: CPT | Performed by: PHYSICAL THERAPIST

## 2022-05-11 PROCEDURE — 97530 THERAPEUTIC ACTIVITIES: CPT | Performed by: PHYSICAL THERAPIST

## 2022-05-11 NOTE — PROGRESS NOTES
Physical Therapy Daily Progress Note        Patient: Pavan Perales   : 1965  Diagnosis/ICD-10 Code:  Right knee pain, unspecified chronicity [M25.561]  Referring practitioner: Stevan Elizabeth MD  Date of Initial Visit: Type: THERAPY  Noted: 2022  Today's Date: 2022  Patient seen for 8 sessions             Subjective   Pavan Perales reports that Orthopedic was very pleased with progress and status of knee.  He states that he is going to get him a custom ACL Brace.    Objective     Knee Strength:  Knee flexion                              Knee extension                                 See Exercise, Manual, and Modality Logs for complete treatment.       Assessment/Plan     Pt  tolerated therapy session well - with progression of therapeutic exercises, CKC-Functional activities, and Manual therapy. He continues to make steady gains in Right Knee ROM, Strength, and Stability; progressing according to protocol.  He exhibist good Quad activation and is now able to perform SLR without any extensor lag observed.  Pt denies any pain pre,during, or post sessions.  Mild edema noted at Right knee.      Progress per Plan of Care- as tolerated as per Protocol           Timed:  Manual Therapy:    8     mins  84931;  Therapeutic Exercise:    20     mins  25147;     Neuromuscular Flower:    0    mins  36983;    Therapeutic Activity:     10     mins  08434;     Gait Trainin     mins  29055;     Ultrasound:     0     mins  93315;    Electrical Stimulation:    0     mins  27699;  Iontophoresis     0     mins  66866    Untimed:  Electrical Stimulation:    15     mins  49521 (MC );  Mechanical Traction:    0     mins  24656;   Fluidotherapy     0     mins  87625  Hot pack     0     mins  18294  Cold pack     0     mins  40871    Timed Treatment:   38   mins   Total Treatment:     53   mins        Ashlee Orantes PTA  Physical Therapist Assistant

## 2022-05-13 ENCOUNTER — TREATMENT (OUTPATIENT)
Dept: PHYSICAL THERAPY | Facility: CLINIC | Age: 57
End: 2022-05-13

## 2022-05-13 DIAGNOSIS — Z98.890 S/P MCL REPAIR: ICD-10-CM

## 2022-05-13 DIAGNOSIS — R26.9 GAIT DISTURBANCE: ICD-10-CM

## 2022-05-13 DIAGNOSIS — Z98.890 S/P ACL RECONSTRUCTION: ICD-10-CM

## 2022-05-13 DIAGNOSIS — M25.561 RIGHT KNEE PAIN, UNSPECIFIED CHRONICITY: Primary | ICD-10-CM

## 2022-05-13 PROCEDURE — 97110 THERAPEUTIC EXERCISES: CPT | Performed by: PHYSICAL THERAPIST

## 2022-05-13 PROCEDURE — 97530 THERAPEUTIC ACTIVITIES: CPT | Performed by: PHYSICAL THERAPIST

## 2022-05-13 NOTE — PROGRESS NOTES
Physical Therapy Progress Note      Patient: Pavan Perales   : 1965  Referring practitioner: Stevan Elizabeth MD  Date of Initial Visit: Type: THERAPY  Noted: 2022  Today's Date: 2022  Patient seen for 9 sessions           Subjective    Pavan Perales reports: right medial knee 4/10  Subjective Questionnaire: LEFS: 50/80=37% limitation      Objective          Tenderness     Right Knee   Tenderness in the medial joint line.     Active Range of Motion     Right Knee   Flexion: 126 degrees   Extension: 3 degrees     Passive Range of Motion   Left Knee   Flexion: 136 degrees     Right Knee   Flexion: 126 degrees   Extension: 0 degrees     Strength/Myotome Testing     Right Knee   Flexion: 4-  Extension: 4-      See Exercise, Manual, and Modality Logs for complete treatment.       Assessment & Plan     Assessment  Impairments: abnormal gait, abnormal muscle tone, abnormal or restricted ROM, activity intolerance, impaired balance, impaired physical strength, lacks appropriate home exercise program, pain with function and weight-bearing intolerance  Functional Limitations: sleeping, walking, uncomfortable because of pain, moving in bed, sitting, standing, stooping and unable to perform repetitive tasks  Assessment details: Pt presents with limitations, noted by evaluation that impede patient's ability to ambulate/functional mobility.  The skills of a therapist will be required to safely and effectively implement the following treatment plan to restore maximal level of function.    Prognosis: good  Prognosis details: Patient demonstrated much improvement with right knee range of motion and strength, but still has limitations.  Patient would benefit from continued skilled physical therapy for improved functional independence/mobility for return to high level functional activity.    Goals  Plan Goals: 1. The patient has limited ROM of the right knee.   LTG 1: 12 weeks:  The patient will demonstrate 0  to 120 degrees of ROM for the right knee in order to allow patient to normalize gait.   STATUS:  Met for flexion; progress to 134 degrees of flexion  STG 1a: 6 weeks:  The patient will demonstrate 0 to 110 degrees of ROM for the right knee.   STATUS:  met     2. The patient has limited strength of the right knee.   LTG 2: 12  weeks: The patient will demonstrate 4+/5 strength for right knee flexion and extension in order to allow patient improved joint stability.   STATUS:  progressing  STG 2a: 6 weeks: The patient will demonstrate 4/5 strength for right knee flexion and extension   STATUS: progressing       3. The patient has gait dysfunction.   LTG 3: 6 weeks:  The patient will ambulate without assistive device, independently, for community distances with minimal limp to the right lower extremity in order to improve mobility and allow patient to perform activities such as grocery shopping with greater ease.   STATUS:  Met  STG 3a: The patient will be independent in HEP.   STATUS: ongoing and progressing as appropriate.      4. The patient complains of right knee pain.   LTG 4: 12 weeks:  The patient will report a pain rating of 2/10 or better in order to improve   tolerance to activities of daily living and improve sleep quality.   STATUS:  ongoing  STG 4a: 6 weeks:  The patient will report a pain rating of 4/10 or better.   STATUS: Met     5. Mobility: Walking/Moving Around Functional Limitation     LTG 5: 12weeks:  The patient will demonstrate 25% limitation by achieving a score of 60/80 on the Lower Extremity Functional Scale.   STATUS:  Progressing  STG 5a: 6 weeks:  The patient will demonstrate 50% limitation by achieving a score of 40/80 on the Lower Extremity Functional Scale.     STATUS: Met    TREATMENT: Manual therapy, gait training, neuromuscular re-education, therapeutic exercise, home exercise instruction, and modalities as needed to include:  moist heat, electrical stimulation, ultrasound, and ice.        Plan  Therapy options: will be seen for skilled therapy services  Planned modality interventions: cryotherapy, electrical stimulation/Russian stimulation and TENS  Planned therapy interventions: manual therapy, soft tissue mobilization, spinal/joint mobilization, strengthening, stretching, therapeutic activities, transfer training, home exercise program, gait training, functional ROM exercises, flexibility and balance/weight-bearing training  Frequency: 2x week  Duration in weeks: 8  Treatment plan discussed with: patient        Visit Diagnoses:    ICD-10-CM ICD-9-CM   1. Right knee pain, unspecified chronicity  M25.561 719.46   2. S/P ACL reconstruction  Z98.890 V45.89   3. Gait disturbance  R26.9 781.2   4. S/P MCL repair  Z98.890 V45.89       Progress strengthening /stabilization /functional activity           Timed:  Manual Therapy:         mins  48137;  Therapeutic Exercise:    24     mins  01985;     Neuromuscular Flower:        mins  11132;    Therapeutic Activity:         14 mins  11946;     Gait Training:           mins  42820;     Ultrasound:          mins  39560;    Electrical Stimulation:         mins  75095 ( );    Untimed:  Electrical Stimulation:         mins  27132 ( );  Mechanical Traction:         mins  20580;     Timed Treatment:   34   mins   Total Treatment:     34   mins  Jenifer Girard PT    Electronically singed 5/13/2022      [place] PT license: [id number]  Physical Therapist

## 2022-05-18 ENCOUNTER — TREATMENT (OUTPATIENT)
Dept: PHYSICAL THERAPY | Facility: CLINIC | Age: 57
End: 2022-05-18

## 2022-05-18 DIAGNOSIS — R26.9 GAIT DISTURBANCE: ICD-10-CM

## 2022-05-18 DIAGNOSIS — Z98.890 S/P ACL RECONSTRUCTION: ICD-10-CM

## 2022-05-18 DIAGNOSIS — Z98.890 S/P MCL REPAIR: ICD-10-CM

## 2022-05-18 DIAGNOSIS — M25.561 RIGHT KNEE PAIN, UNSPECIFIED CHRONICITY: Primary | ICD-10-CM

## 2022-05-18 PROCEDURE — 97110 THERAPEUTIC EXERCISES: CPT | Performed by: PHYSICAL THERAPIST

## 2022-05-18 PROCEDURE — 97530 THERAPEUTIC ACTIVITIES: CPT | Performed by: PHYSICAL THERAPIST

## 2022-05-18 NOTE — PROGRESS NOTES
Physical Therapy Daily Treatment Note      Patient: Pavan Perales   : 1965  Referring practitioner: Stevan Elizabeth MD  Date of Initial Visit: Type: THERAPY  Noted: 2022  Today's Date: 2022  Patient seen for 10 sessions           Subjective   Pavan Perales reports: doing well; no c/o of pain      Objective   See Exercise, Manual, and Modality Logs for complete treatment.       Assessment & Plan     Assessment  Prognosis details: Patient continues to tolerate progression of right LE strengthening.        Visit Diagnoses:    ICD-10-CM ICD-9-CM   1. Right knee pain, unspecified chronicity  M25.561 719.46   2. S/P ACL reconstruction  Z98.890 V45.89   3. S/P MCL repair  Z98.890 V45.89   4. Gait disturbance  R26.9 781.2       Progress per Plan of Care           Timed:  Manual Therapy:         mins  36874;  Therapeutic Exercise:    16     mins  02274;     Neuromuscular Flower:        mins  96450;    Therapeutic Activity:     10     mins  94477;     Gait Training:           mins  95566;     Ultrasound:          mins  76962;    Electrical Stimulation:         mins  13186 ( );    Untimed:  Electrical Stimulation:         mins  80892 ( );  Mechanical Traction:         mins  36024;     Timed Treatment:   26   mins   Total Treatment:     26   mins  Jenifer Girard PT    Electronically singed 2022      [place] PT license: [id number]  Physical Therapist

## 2022-05-20 ENCOUNTER — TREATMENT (OUTPATIENT)
Dept: PHYSICAL THERAPY | Facility: CLINIC | Age: 57
End: 2022-05-20

## 2022-05-20 DIAGNOSIS — Z98.890 S/P MCL REPAIR: ICD-10-CM

## 2022-05-20 DIAGNOSIS — Z98.890 S/P ACL RECONSTRUCTION: ICD-10-CM

## 2022-05-20 DIAGNOSIS — M25.561 RIGHT KNEE PAIN, UNSPECIFIED CHRONICITY: Primary | ICD-10-CM

## 2022-05-20 DIAGNOSIS — R26.9 GAIT DISTURBANCE: ICD-10-CM

## 2022-05-20 PROCEDURE — 97110 THERAPEUTIC EXERCISES: CPT | Performed by: PHYSICAL THERAPIST

## 2022-05-20 NOTE — PROGRESS NOTES
Physical Therapy Daily Treatment Note      Patient: Pavan Perales   : 1965  Referring practitioner: Stevan Elizabeth MD  Date of Initial Visit: Type: THERAPY  Noted: 2022  Today's Date: 2022  Patient seen for 11 sessions           Subjective   Pavan Perales reports: right knee a little tight/sore 4-5/10      Objective   See Exercise, Manual, and Modality Logs for complete treatment.       Assessment & Plan     Assessment  Prognosis details: Patient progressing well and decreased to 1x a week.         Visit Diagnoses:    ICD-10-CM ICD-9-CM   1. Right knee pain, unspecified chronicity  M25.561 719.46   2. S/P ACL reconstruction  Z98.890 V45.89   3. S/P MCL repair  Z98.890 V45.89   4. Gait disturbance  R26.9 781.2       Progress strengthening /stabilization /functional activity           Timed:  Manual Therapy:         mins  94428;  Therapeutic Exercise:    23     mins  48562;     Neuromuscular Flower:        mins  02538;    Therapeutic Activity:          mins  20186;     Gait Training:           mins  93352;     Ultrasound:          mins  65720;    Electrical Stimulation:         mins  45523 ( );    Untimed:  Electrical Stimulation:         mins  70566 ( );  Mechanical Traction:         mins  45354;     Timed Treatment:   23   mins   Total Treatment:     23   mins  Jenifer Girard PT    Electronically singed 2022      [place] PT license: [id number]  Physical Therapist

## 2022-05-27 ENCOUNTER — TREATMENT (OUTPATIENT)
Dept: PHYSICAL THERAPY | Facility: CLINIC | Age: 57
End: 2022-05-27

## 2022-05-27 DIAGNOSIS — R26.9 GAIT DISTURBANCE: ICD-10-CM

## 2022-05-27 DIAGNOSIS — M25.561 RIGHT KNEE PAIN, UNSPECIFIED CHRONICITY: Primary | ICD-10-CM

## 2022-05-27 DIAGNOSIS — Z98.890 S/P ACL RECONSTRUCTION: ICD-10-CM

## 2022-05-27 DIAGNOSIS — Z98.890 S/P MCL REPAIR: ICD-10-CM

## 2022-05-27 PROCEDURE — 97110 THERAPEUTIC EXERCISES: CPT | Performed by: PHYSICAL THERAPIST

## 2022-05-27 NOTE — PROGRESS NOTES
Physical Therapy Daily Treatment Note      Patient: Pavan Perales   : 1965  Referring practitioner: Stevan Elizabeth MD  Date of Initial Visit: Type: THERAPY  Noted: 2022  Today's Date: 2022  Patient seen for 12 sessions           Subjective   Pavan Perales reports:not having much difficulty with right knee      Objective   See Exercise, Manual, and Modality Logs for complete treatment.       Assessment & Plan     Assessment  Prognosis details: Patient able to tolerate progression of right LE strengthening without increase symptoms.         Visit Diagnoses:    ICD-10-CM ICD-9-CM   1. Right knee pain, unspecified chronicity  M25.561 719.46   2. S/P ACL reconstruction  Z98.890 V45.89   3. S/P MCL repair  Z98.890 V45.89   4. Gait disturbance  R26.9 781.2       Progress strengthening /stabilization /functional activity           Timed:  Manual Therapy:         mins  76170;  Therapeutic Exercise:    25     mins  48718;     Neuromuscular Flower:        mins  05287;    Therapeutic Activity:          mins  03756;     Gait Training:           mins  74428;     Ultrasound:          mins  51143;    Electrical Stimulation:         mins  46834 ( );    Untimed:  Electrical Stimulation:         mins  65995 ( );  Mechanical Traction:         mins  18051;     Timed Treatment:   25   mins   Total Treatment:     25   mins  Jenifer Girard PT    Electronically singed 2022      [place] PT license: [id number]  Physical Therapist

## 2022-06-01 ENCOUNTER — TREATMENT (OUTPATIENT)
Dept: PHYSICAL THERAPY | Facility: CLINIC | Age: 57
End: 2022-06-01

## 2022-06-01 DIAGNOSIS — M25.561 RIGHT KNEE PAIN, UNSPECIFIED CHRONICITY: Primary | ICD-10-CM

## 2022-06-01 DIAGNOSIS — R26.9 GAIT DISTURBANCE: ICD-10-CM

## 2022-06-01 DIAGNOSIS — Z98.890 S/P ACL RECONSTRUCTION: ICD-10-CM

## 2022-06-01 DIAGNOSIS — Z98.890 S/P MCL REPAIR: ICD-10-CM

## 2022-06-01 PROCEDURE — 97140 MANUAL THERAPY 1/> REGIONS: CPT | Performed by: PHYSICAL THERAPIST

## 2022-06-01 PROCEDURE — 97110 THERAPEUTIC EXERCISES: CPT | Performed by: PHYSICAL THERAPIST

## 2022-06-01 PROCEDURE — 97530 THERAPEUTIC ACTIVITIES: CPT | Performed by: PHYSICAL THERAPIST

## 2022-06-01 NOTE — PROGRESS NOTES
Physical Therapy Daily Progress Note        Patient: Pavan Perales   : 1965  Diagnosis/ICD-10 Code:  Right knee pain, unspecified chronicity [M25.561]  Referring practitioner: Stevan Elizabeth MD  Date of Initial Visit: Type: THERAPY  Noted: 2022  Today's Date: 2022  Patient seen for 13 sessions             Subjective   Pavan Perales denies having any pain in his right knee upon arrival today.  He states that he received his new brace in the mail just prior to therapy session today.  He brought Don Marguerite brace into clinic today for assist in applying.    Objective       Active Range of Motion      Right Knee   Flexion: 126 degrees   Extension: 3 degrees      Passive Range of Motion   Left Knee   Flexion: 136 degrees      Right Knee   Flexion: 135 degrees   Extension: 0 degrees     See Exercise and Manual Logs for complete treatment.       Assessment/Plan     Pt tolerated therapy session well - with progression of therapeutic exercises, CKC-Functional activities, and Manual therapy. He has improved, but continues to have deficits in Right Knee  Strength, and Stability; limiting function and ability to perform all ADLs and higher level functional activities  at this time.  New functional brace used today during higher lever functional activities.  Pt instructed in application and use of brace.  He verbalized and demonstrated good understanding.      Progress per Plan of Care- as tolerated - as per protocol           Timed:  Manual Therapy:    8     mins  10641;  Therapeutic Exercise:    20     mins  66575;     Neuromuscular Flower:    0    mins  78155;    Therapeutic Activity:     10     mins  87392;     Gait Trainin     mins  33395;     Ultrasound:     0     mins  86680;    Electrical Stimulation:    0     mins  08583;  Iontophoresis     0     mins  49664    Untimed:  Electrical Stimulation:    0     mins  47962 ( );  Mechanical Traction:    0     mins  76157;   Fluidotherapy      0     mins  50719  Hot pack     0     mins  65447  Cold pack     0     mins  13476    Timed Treatment:   38   mins   Total Treatment:     38   mins        Ashlee Orantes PTA  Physical Therapist Assistant

## 2022-06-07 NOTE — PROGRESS NOTES
Physical Therapy Daily Progress Note        Patient: Pavan Perales   : 1965  Diagnosis/ICD-10 Code:  Right knee pain, unspecified chronicity [M25.561]  Referring practitioner: Stevan Elizabeth MD  Date of Initial Visit: Type: THERAPY  Noted: 2022  Today's Date: 2022  Patient seen for 14 sessions             Subjective   Pavan Perales denies having any pain in Right knee.  He reports that he has been performing all of his normal activities without any pain or difficulty.  He reports occasional use of newly obtained functional Don Marguerite knee brace.    Objective     Knee measurements:  AROM:                                    Left                  Right  Flexion:                                   135                  135  Extension:                               0                      0        PROM:                                    Left                  Right  Flexion:                                   135                  135  Extension:                               0                      0     Strength:                                Left                  Right  Hip Flexion:                             5/5                    5/5  Hip Abduction:                         5/5                    5/5  Hip Adduction:                         5/5                    4+/5  Hip Extension:                         5/5                    5/5     Knee flexion                            5/5                    4+/5  Knee extension                       5/5                    5/5    Single Leg Balance:  Right:  45-60 sec (without brace) - no pain reported       See Exercise and Manual Logs for complete treatment.       Assessment/Plan     Pt has tolerated therapy sessions well - with progression of therapeutic exercises, CKC-Functional activities, and Manual therapy. He has made notable improvements in all areas and exhibits improved functional strength/stability of right hip/knee.  He reports that he has  been performing most all of his normal home and work activities without any pain or difficulty.  Pt has received New functional knee brace and has been instructed in application and use of brace.     See Progress note to MD for follow-up with Orthopedic scheduled 22.       Progress per Plan of Care- as tolerated- as per protocol           Timed:  Manual Therapy:    8     mins  43481;  Therapeutic Exercise:    20     mins  46374;     Neuromuscular Flower:    0    mins  59508;    Therapeutic Activity:     10     mins  66054;     Gait Trainin     mins  16935;     Ultrasound:     0     mins  34646;    Electrical Stimulation:    0     mins  71647;  Iontophoresis     0     mins  38245    Untimed:  Electrical Stimulation:    0     mins  86824 ( );  Mechanical Traction:    0     mins  97401;   Fluidotherapy     0     mins  06607  Hot pack     0     mins  39924  Cold pack     0     mins  69342    Timed Treatment:   38   mins   Total Treatment:     38   mins        Ashlee Orantes PTA  Physical Therapist Assistant

## 2022-06-08 ENCOUNTER — TREATMENT (OUTPATIENT)
Dept: PHYSICAL THERAPY | Facility: CLINIC | Age: 57
End: 2022-06-08

## 2022-06-08 DIAGNOSIS — Z98.890 S/P ACL RECONSTRUCTION: ICD-10-CM

## 2022-06-08 DIAGNOSIS — R26.9 GAIT DISTURBANCE: ICD-10-CM

## 2022-06-08 DIAGNOSIS — Z98.890 S/P MCL REPAIR: ICD-10-CM

## 2022-06-08 DIAGNOSIS — M25.561 RIGHT KNEE PAIN, UNSPECIFIED CHRONICITY: Primary | ICD-10-CM

## 2022-06-08 PROCEDURE — 97530 THERAPEUTIC ACTIVITIES: CPT | Performed by: PHYSICAL THERAPIST

## 2022-06-08 PROCEDURE — 97140 MANUAL THERAPY 1/> REGIONS: CPT | Performed by: PHYSICAL THERAPIST

## 2022-06-08 PROCEDURE — 97110 THERAPEUTIC EXERCISES: CPT | Performed by: PHYSICAL THERAPIST

## 2022-06-21 ENCOUNTER — DOCUMENTATION (OUTPATIENT)
Dept: PHYSICAL THERAPY | Facility: CLINIC | Age: 57
End: 2022-06-21

## 2022-10-22 PROCEDURE — U0004 COV-19 TEST NON-CDC HGH THRU: HCPCS | Performed by: NURSE PRACTITIONER

## 2022-10-23 ENCOUNTER — TELEPHONE (OUTPATIENT)
Dept: URGENT CARE | Facility: CLINIC | Age: 57
End: 2022-10-23

## 2022-10-24 NOTE — TELEPHONE ENCOUNTER
Called patient, results reviewed, recommend quarantine and supportive care.  Patient states he will contact his primary care provider tomorrow to determine if he meets criteria for treatment.  Patient to follow-up as needed or symptoms worsen or persist.  He verbalized understanding.

## 2022-12-29 ENCOUNTER — OFFICE VISIT (OUTPATIENT)
Dept: SURGERY | Facility: CLINIC | Age: 57
End: 2022-12-29

## 2022-12-29 ENCOUNTER — TELEPHONE (OUTPATIENT)
Dept: SURGERY | Facility: CLINIC | Age: 57
End: 2022-12-29

## 2022-12-29 ENCOUNTER — PREP FOR SURGERY (OUTPATIENT)
Dept: OTHER | Facility: HOSPITAL | Age: 57
End: 2022-12-29

## 2022-12-29 VITALS — WEIGHT: 201 LBS | RESPIRATION RATE: 16 BRPM | BODY MASS INDEX: 28.14 KG/M2 | HEIGHT: 71 IN

## 2022-12-29 DIAGNOSIS — K21.9 GERD WITHOUT ESOPHAGITIS: Primary | ICD-10-CM

## 2022-12-29 PROCEDURE — 99203 OFFICE O/P NEW LOW 30 MIN: CPT | Performed by: SURGERY

## 2022-12-29 RX ORDER — MELOXICAM 15 MG/1
15 TABLET ORAL DAILY
COMMUNITY
Start: 2022-08-29 | End: 2022-12-29

## 2022-12-29 RX ORDER — SODIUM CHLORIDE 0.9 % (FLUSH) 0.9 %
10 SYRINGE (ML) INJECTION EVERY 12 HOURS SCHEDULED
Status: CANCELLED | OUTPATIENT
Start: 2022-12-29

## 2022-12-29 RX ORDER — SODIUM CHLORIDE 0.9 % (FLUSH) 0.9 %
10 SYRINGE (ML) INJECTION AS NEEDED
Status: CANCELLED | OUTPATIENT
Start: 2022-12-29

## 2022-12-29 RX ORDER — PANTOPRAZOLE SODIUM 40 MG/1
40 TABLET, DELAYED RELEASE ORAL DAILY
Qty: 30 TABLET | Refills: 2 | Status: SHIPPED | OUTPATIENT
Start: 2022-12-29 | End: 2023-12-29

## 2022-12-29 RX ORDER — SODIUM CHLORIDE 9 MG/ML
40 INJECTION, SOLUTION INTRAVENOUS AS NEEDED
Status: CANCELLED | OUTPATIENT
Start: 2022-12-29

## 2022-12-29 RX ORDER — DICLOFENAC SODIUM 20 MG/G
2 SOLUTION TOPICAL
Status: ON HOLD | COMMUNITY
Start: 2022-10-11 | End: 2023-01-03

## 2022-12-29 NOTE — PROGRESS NOTES
Chief Complaint: EGD Consult and Heartburn    Subjective         History of Present Illness  Pavan Perales is a 57 y.o. male presents to [facility]. The patient is to be seen for evaluation of GERD.    GERD  The patient has a history of GERD that is worsening. He notes that he has severe nausea as well. He describes the acid reflux as a bitter taste in his mouth that causes him to cough and hiccup. He states that he is taking Prilosec once per day, over-the-counter Pepcid and Tums as needed. He denies any new medications. He does not have a history of abdominal surgeries. He states that he had an endoscopy at least 20 years ago and a colonoscopy approximately 10 years ago, which was normal. He denies any family history of colon cancer.     The patient reports ongoing constipation. He states he takes a fiber supplement daily.     The patient reports [facility].     Objective     Past Medical History:   Diagnosis Date   • Hypertension        Past Surgical History:   Procedure Laterality Date   • OTHER SURGICAL HISTORY      CANCER REMOVAL FACE         Current Outpatient Medications:   •  amLODIPine (NORVASC) 5 MG tablet, Norvasc 5 mg oral tablet take 1 tablet (5 mg) by oral route once daily   Active, Disp: , Rfl:   •  cyclobenzaprine (FLEXERIL) 10 MG tablet, cyclobenzaprine 10 mg oral tablet take 1 tablet by oral route As needed   Active, Disp: , Rfl:   •  Diclofenac Sodium 2 % solution, Apply 2 Pump topically to the appropriate area as directed., Disp: , Rfl:   •  fluticasone (FLONASE) 50 MCG/ACT nasal spray, 2 sprays into the nostril(s) as directed by provider Daily., Disp: 18.2 mL, Rfl: 0  •  meloxicam (MOBIC) 15 MG tablet, , Disp: , Rfl:   •  mirtazapine (REMERON) 30 MG tablet, mirtazapine 30 mg oral tablet take 1 tablet (30 mg) by oral route once daily before bedtime   Suspended, Disp: , Rfl:   •  omeprazole  "(priLOSEC) 20 MG capsule, omeprazole 20 mg oral capsule,delayed release(DR/EC) take 1 capsule (20 mg) by oral route once daily before a meal   Active, Disp: , Rfl:   •  traZODone (DESYREL) 100 MG tablet, trazodone 100 mg oral tablet take 1 tablet (100 mg) by oral route once daily at bedtime   Active, Disp: , Rfl:   •  UNKNOWN TO PATIENT, Patient unaware of name of medication for Cholesterol, Disp: , Rfl:   •  pantoprazole (Protonix) 40 MG EC tablet, Take 1 tablet by mouth Daily., Disp: 30 tablet, Rfl: 2  •  promethazine-dextromethorphan (PROMETHAZINE-DM) 6.25-15 MG/5ML syrup, Take 5 mL by mouth 4 (Four) Times a Day As Needed for Cough., Disp: 180 mL, Rfl: 0    No Known Allergies     History reviewed. No pertinent family history.    Social History     Socioeconomic History   • Marital status:    Tobacco Use   • Smoking status: Never   • Smokeless tobacco: Current   Vaping Use   • Vaping Use: Never used   Substance and Sexual Activity   • Alcohol use: Yes     Comment: RARELY   • Drug use: Not Currently   • Sexual activity: Defer       Vital Signs:   Resp 16   Ht 180.3 cm (71\")   Wt 91.2 kg (201 lb)   BMI 28.03 kg/m²      Physical Exam   No acute distress. Nonlabored breathing. Abdomen is soft.    Result Review :            Procedures        Assessment and Plan    There are no diagnoses linked to this encounter.     1. Patient with acid reflux and in need of screening colonoscopy.  - We will plan for an EGD and colonoscopy in the future. He was instructed to discontinue the Prilosec. We will also call him in some Protonix to see if that helps better with his current reflux symptoms. He can utilize Metamucil or Citrucel if he has constipation symptoms. He will notify the us regarding the [facility]. Risks, benefits, alternatives of the scopes were discussed extensively. All questions were answered. Patient voiced understanding and agreed to proceed with the above plan. All the " above was discussed with the patient extensively. All questions were answered. Patient voiced understand and agreed to proceed with the above plan.        Transcribed from ambient dictation for Jose Hopson MD by Gwen Alegria.  12/29/22   12:17 EST    Patient or patient representative verbalized consent to the visit recording.  I have personally performed the services described in this document as transcribed by the above individual, and it is both accurate and complete.      Follow Up   No follow-ups on file.  Patient was given instructions and counseling regarding his condition or for health maintenance advice. Please see specific information pulled into the AVS if appropriate.

## 2022-12-29 NOTE — TELEPHONE ENCOUNTER
Patient called back with medication you were asking about.    Movi Prep  Metoclopramide-take 1 pill day before procedure  Bisacodyl-take 4 tabs po day before  Sinethicone-chew 2 tabs morning of    He would like for you to call him back

## 2022-12-29 NOTE — PRE-PROCEDURE INSTRUCTIONS
Pt. Instructed on laxative and skin prep, pre-op meds, clear liquid diet. Ok to take Tylenol if needed but no other over the counter pain relievers, no vitamins, supplements.

## 2022-12-29 NOTE — H&P (VIEW-ONLY)
Chief Complaint: EGD Consult and Heartburn    Subjective         History of Present Illness  Pavan Perales is a 57 y.o. male presents to [facility]. The patient is to be seen for evaluation of GERD.    GERD  The patient has a history of GERD that is worsening. He notes that he has severe nausea as well. He describes the acid reflux as a bitter taste in his mouth that causes him to cough and hiccup. He states that he is taking Prilosec once per day, over-the-counter Pepcid and Tums as needed. He denies any new medications. He does not have a history of abdominal surgeries. He states that he had an endoscopy at least 20 years ago and a colonoscopy approximately 10 years ago, which was normal. He denies any family history of colon cancer.     The patient reports ongoing constipation. He states he takes a fiber supplement daily.     The patient [facility]     Objective     Past Medical History:   Diagnosis Date   • Hypertension        Past Surgical History:   Procedure Laterality Date   • OTHER SURGICAL HISTORY      CANCER REMOVAL FACE         Current Outpatient Medications:   •  amLODIPine (NORVASC) 5 MG tablet, Norvasc 5 mg oral tablet take 1 tablet (5 mg) by oral route once daily   Active, Disp: , Rfl:   •  cyclobenzaprine (FLEXERIL) 10 MG tablet, cyclobenzaprine 10 mg oral tablet take 1 tablet by oral route As needed   Active, Disp: , Rfl:   •  Diclofenac Sodium 2 % solution, Apply 2 Pump topically to the appropriate area as directed., Disp: , Rfl:   •  fluticasone (FLONASE) 50 MCG/ACT nasal spray, 2 sprays into the nostril(s) as directed by provider Daily., Disp: 18.2 mL, Rfl: 0  •  meloxicam (MOBIC) 15 MG tablet, , Disp: , Rfl:   •  mirtazapine (REMERON) 30 MG tablet, mirtazapine 30 mg oral tablet take 1 tablet (30 mg) by oral route once daily before bedtime   Suspended, Disp: , Rfl:   •  omeprazole  (priLOSEC) 20 MG capsule, omeprazole 20 mg oral capsule,delayed release(DR/EC) take 1 capsule (20 mg) by oral route once daily before a meal   Active, Disp: , Rfl:   •  traZODone (DESYREL) 100 MG tablet, trazodone 100 mg oral tablet take 1 tablet (100 mg) by oral route once daily at bedtime   Active, Disp: , Rfl:   •  UNKNOWN TO PATIENT, Patient unaware of name of medication for Cholesterol, Disp: , Rfl:   •  pantoprazole (Protonix) 40 MG EC tablet, Take 1 tablet by mouth Daily., Disp: 30 tablet, Rfl: 2  •  promethazine-dextromethorphan (PROMETHAZINE-DM) 6.25-15 MG/5ML syrup, Take 5 mL by mouth 4 (Four) Times a Day As Needed for Cough., Disp: 180 mL, Rfl: 0    No Known Allergies     History reviewed. No pertinent family history.    Social History     Socioeconomic History   • Marital status:    Tobacco Use   • Smoking status: Never   • Smokeless tobacco: Current   Vaping Use   • Vaping Use: Never used   Substance and Sexual Activity   • Alcohol use: Yes     Comment: RARELY   • Drug use: Not Currently   • Sexual activity: Defer       Vital Signs:   Resp 16   Ht 180.3 cm (71\")   Wt 91.2 kg (201 lb)   BMI 28.03 kg/m²      Physical Exam   No acute distress. Nonlabored breathing. Abdomen is soft.    Result Review :            Procedures        Assessment and Plan    There are no diagnoses linked to this encounter.     1. Patient with acid reflux and in need of screening colonoscopy.  - We will plan for an EGD and colonoscopy in the future. He was instructed to discontinue the Prilosec. We will also call him in some Protonix to see if that helps better with his current reflux symptoms. He can utilize Metamucil or Citrucel if he has constipation symptoms. He will notify the us regarding the [facility]. Risks, benefits, alternatives of the scopes were discussed extensively. All questions were answered. Patient voiced understanding and agreed to proceed with the above plan. All the  above was discussed with the patient extensively. All questions were answered. Patient voiced understand and agreed to proceed with the above plan.        Transcribed from ambient dictation for Jose Hopson MD by Gwen Alegria.  12/29/22   12:17 EST    Patient or patient representative verbalized consent to the visit recording.  I have personally performed the services described in this document as transcribed by the above individual, and it is both accurate and complete.      Follow Up   No follow-ups on file.  Patient was given instructions and counseling regarding his condition or for health maintenance advice. Please see specific information pulled into the AVS if appropriate.

## 2023-01-03 ENCOUNTER — HOSPITAL ENCOUNTER (OUTPATIENT)
Facility: HOSPITAL | Age: 58
Setting detail: HOSPITAL OUTPATIENT SURGERY
Discharge: HOME | End: 2023-01-03
Attending: SURGERY | Admitting: SURGERY
Payer: OTHER GOVERNMENT

## 2023-01-03 ENCOUNTER — ANESTHESIA EVENT (OUTPATIENT)
Dept: GASTROENTEROLOGY | Facility: HOSPITAL | Age: 58
End: 2023-01-03
Payer: OTHER GOVERNMENT

## 2023-01-03 ENCOUNTER — ANESTHESIA (OUTPATIENT)
Dept: GASTROENTEROLOGY | Facility: HOSPITAL | Age: 58
End: 2023-01-03
Payer: OTHER GOVERNMENT

## 2023-01-03 VITALS
BODY MASS INDEX: 28.29 KG/M2 | OXYGEN SATURATION: 94 % | SYSTOLIC BLOOD PRESSURE: 115 MMHG | HEART RATE: 94 BPM | WEIGHT: 202.82 LBS | RESPIRATION RATE: 14 BRPM | DIASTOLIC BLOOD PRESSURE: 71 MMHG | TEMPERATURE: 96.8 F

## 2023-01-03 DIAGNOSIS — K21.9 GERD WITHOUT ESOPHAGITIS: ICD-10-CM

## 2023-01-03 PROCEDURE — 88305 TISSUE EXAM BY PATHOLOGIST: CPT | Performed by: SURGERY

## 2023-01-03 PROCEDURE — 25010000002 PROPOFOL 10 MG/ML EMULSION: Performed by: MARRIAGE & FAMILY THERAPIST

## 2023-01-03 RX ORDER — SODIUM CHLORIDE, SODIUM LACTATE, POTASSIUM CHLORIDE, CALCIUM CHLORIDE 600; 310; 30; 20 MG/100ML; MG/100ML; MG/100ML; MG/100ML
1000 INJECTION, SOLUTION INTRAVENOUS CONTINUOUS
Status: DISCONTINUED | OUTPATIENT
Start: 2023-01-03 | End: 2023-01-03 | Stop reason: HOSPADM

## 2023-01-03 RX ORDER — LIDOCAINE HYDROCHLORIDE 20 MG/ML
INJECTION, SOLUTION EPIDURAL; INFILTRATION; INTRACAUDAL; PERINEURAL AS NEEDED
Status: DISCONTINUED | OUTPATIENT
Start: 2023-01-03 | End: 2023-01-03 | Stop reason: SURG

## 2023-01-03 RX ORDER — SODIUM CHLORIDE, SODIUM LACTATE, POTASSIUM CHLORIDE, CALCIUM CHLORIDE 600; 310; 30; 20 MG/100ML; MG/100ML; MG/100ML; MG/100ML
30 INJECTION, SOLUTION INTRAVENOUS CONTINUOUS
Status: DISCONTINUED | OUTPATIENT
Start: 2023-01-03 | End: 2023-01-03 | Stop reason: HOSPADM

## 2023-01-03 RX ORDER — SODIUM CHLORIDE 0.9 % (FLUSH) 0.9 %
10 SYRINGE (ML) INJECTION EVERY 12 HOURS SCHEDULED
Status: DISCONTINUED | OUTPATIENT
Start: 2023-01-03 | End: 2023-01-03 | Stop reason: HOSPADM

## 2023-01-03 RX ORDER — PROPOFOL 10 MG/ML
VIAL (ML) INTRAVENOUS AS NEEDED
Status: DISCONTINUED | OUTPATIENT
Start: 2023-01-03 | End: 2023-01-03 | Stop reason: SURG

## 2023-01-03 RX ORDER — SODIUM CHLORIDE 0.9 % (FLUSH) 0.9 %
10 SYRINGE (ML) INJECTION AS NEEDED
Status: DISCONTINUED | OUTPATIENT
Start: 2023-01-03 | End: 2023-01-03 | Stop reason: HOSPADM

## 2023-01-03 RX ORDER — SODIUM CHLORIDE 9 MG/ML
40 INJECTION, SOLUTION INTRAVENOUS AS NEEDED
Status: DISCONTINUED | OUTPATIENT
Start: 2023-01-03 | End: 2023-01-03 | Stop reason: HOSPADM

## 2023-01-03 RX ADMIN — SODIUM CHLORIDE, POTASSIUM CHLORIDE, SODIUM LACTATE AND CALCIUM CHLORIDE: 600; 310; 30; 20 INJECTION, SOLUTION INTRAVENOUS at 07:26

## 2023-01-03 RX ADMIN — PROPOFOL 180 MCG/KG/MIN: 10 INJECTION, EMULSION INTRAVENOUS at 07:54

## 2023-01-03 RX ADMIN — PROPOFOL 180 MCG/KG/MIN: 10 INJECTION, EMULSION INTRAVENOUS at 07:29

## 2023-01-03 RX ADMIN — PROPOFOL 100 MG: 10 INJECTION, EMULSION INTRAVENOUS at 07:29

## 2023-01-03 RX ADMIN — LIDOCAINE HYDROCHLORIDE 50 MG: 20 INJECTION, SOLUTION EPIDURAL; INFILTRATION; INTRACAUDAL; PERINEURAL at 07:29

## 2023-01-03 RX ADMIN — SODIUM CHLORIDE, POTASSIUM CHLORIDE, SODIUM LACTATE AND CALCIUM CHLORIDE 30 ML/HR: 600; 310; 30; 20 INJECTION, SOLUTION INTRAVENOUS at 06:42

## 2023-01-03 NOTE — ANESTHESIA PREPROCEDURE EVALUATION
Anesthesia Evaluation     Patient summary reviewed and Nursing notes reviewed   no history of anesthetic complications:  NPO Solid Status: > 8 hours  NPO Liquid Status: > 2 hours           Airway   Mallampati: III  TM distance: >3 FB  Neck ROM: full  No difficulty expected  Dental      Pulmonary - negative pulmonary ROS and normal exam    breath sounds clear to auscultation  Cardiovascular - normal exam  Exercise tolerance: good (4-7 METS)    Rhythm: regular  Rate: normal    (+) hypertension, hyperlipidemia,       Neuro/Psych- negative ROS  GI/Hepatic/Renal/Endo    (+)  GERD,      Musculoskeletal     (+) back pain,   Abdominal    Substance History - negative use     OB/GYN negative ob/gyn ROS         Other   arthritis,    history of cancer    ROS/Med Hx Other: PAT Nursing Notes unavailable.                 Anesthesia Plan    ASA 2     general     (Total IV Anesthesia    Patient understands anesthesia not responsible for dental damage.  )  intravenous induction     Anesthetic plan, risks, benefits, and alternatives have been provided, discussed and informed consent has been obtained with: patient.    Plan discussed with CRNA.        CODE STATUS:

## 2023-01-03 NOTE — ANESTHESIA POSTPROCEDURE EVALUATION
Patient: Pavan Perales    Procedure Summary     Date: 01/03/23 Room / Location: [facility] ENDOSCOPY 1 / [facility] ENDOSCOPY    Anesthesia Start: 0727 Anesthesia Stop: 0815    Procedures:       COLONOSCOPY WITH POLYPECTOMY      ESOPHAGOGASTRODUODENOSCOPY WITH BIOPSIES, POLYPECTOMIES Diagnosis:       GERD without esophagitis      (GERD without esophagitis [K21.9])    Surgeons: Jose Hopson MD Provider: Romario Moss MD    Anesthesia Type: general ASA Status: 2          Anesthesia Type: general    Vitals  Vitals Value Taken Time   /77 01/03/23 0823   Temp 36 °C (96.8 °F) 01/03/23 0808   Pulse 107 01/03/23 0826   Resp 10 01/03/23 0823   SpO2 94 % 01/03/23 0826   Vitals shown include unvalidated device data.        Post Anesthesia Care and Evaluation    Patient location during evaluation: bedside  Patient participation: complete - patient participated  Level of consciousness: awake  Pain management: adequate    Airway patency: patent  Anesthetic complications: No anesthetic complications  PONV Status: none  Cardiovascular status: acceptable and stable  Respiratory status: acceptable  Hydration status: acceptable    Comments: An Anesthesiologist personally participated in the most demanding procedures (including induction and emergence if applicable) in the anesthesia plan, monitored the course of anesthesia administration at frequent intervals and remained physically present and available for immediate diagnosis and treatment of emergencies.

## 2023-01-04 LAB
CYTO UR: NORMAL
LAB AP CASE REPORT: NORMAL
LAB AP CLINICAL INFORMATION: NORMAL
PATH REPORT.FINAL DX SPEC: NORMAL
PATH REPORT.GROSS SPEC: NORMAL

## 2023-01-18 ENCOUNTER — OFFICE VISIT (OUTPATIENT)
Dept: SURGERY | Facility: CLINIC | Age: 58
End: 2023-01-18
Payer: OTHER GOVERNMENT

## 2023-01-18 VITALS — WEIGHT: 208 LBS | RESPIRATION RATE: 16 BRPM | BODY MASS INDEX: 29.01 KG/M2

## 2023-01-18 DIAGNOSIS — K21.9 GERD WITHOUT ESOPHAGITIS: Primary | ICD-10-CM

## 2023-01-18 PROCEDURE — 99213 OFFICE O/P EST LOW 20 MIN: CPT | Performed by: SURGERY

## 2023-01-18 RX ORDER — PANTOPRAZOLE SODIUM 40 MG/1
40 TABLET, DELAYED RELEASE ORAL DAILY
Qty: 30 TABLET | Refills: 6 | Status: SHIPPED | OUTPATIENT
Start: 2023-01-18 | End: 2023-02-17

## 2023-01-18 NOTE — PROGRESS NOTES
"Chief Complaint: Post-op (EGD/Colonoscopy )    Subjective         History of Present Illness  Pavan Perales is a 58 y.o. male presents to [facility] SUR[facility]ERY.     Colonoscopy and EGD  The patient reports he is doing well after his EGD and colonoscopy. He is eating, drinking, urinating, and having normal bowel movements. The patient denies a family history of colon cancer. The \"two doses\" of medication is working for the patient. He states he has not experienced \"any issues\" since he began taking the medication.    Objective     Past Medical History:   Diagnosis Date   • Back pain    • Hyperlipidemia    • Hypertension    • Insomnia        Past Surgical History:   Procedure Laterality Date   • BACK SURGERY     • COLONOSCOPY N/A 1/3/2023    Procedure: COLONOSCOPY WITH POLYPECTOMY;  Surgeon: Jose Hopson MD;  Location: [facility] ENDOSCOPY;  Service: General;  Laterality: N/A;  COLON POLYP, SUBOPTIMAL PREP   • ENDOSCOPY N/A 1/3/2023    Procedure: ESOPHAGOGASTRODUODENOSCOPY WITH BIOPSIES, POLYPECTOMIES;  Surgeon: Jose Hopson MD;  Location: [facility] ENDOSCOPY;  Service: General;  Laterality: N/A;  HIATAL HERNIA, GASTRIC POLYPS   • KNEE SURGERY     • OTHER SURGICAL HISTORY      CANCER REMOVAL FACE         Current Outpatient Medications:   •  amLODIPine (NORVASC) 5 MG tablet, Norvasc 5 mg oral tablet take 1 tablet (5 mg) by oral route once daily   Active, Disp: , Rfl:   •  cyclobenzaprine (FLEXERIL) 10 MG tablet, cyclobenzaprine 10 mg oral tablet take 1 tablet by oral route As needed   Active, Disp: , Rfl:   •  pantoprazole (Protonix) 40 MG EC tablet, Take 1 tablet by mouth Daily., Disp: 30 tablet, Rfl: 2  •  traZODone (DESYREL) 100 MG tablet, Take 300 mg by mouth Every Night., Disp: , Rfl:   •  UNKNOWN TO PATIENT, Patient unaware of name of medication for Cholesterol, Disp: , Rfl:   •  pantoprazole (Protonix) 40 MG EC tablet, Take 1 tablet by mouth Daily for 30 days., Disp: 30 tablet, " Rfl: 6    No Known Allergies     Family History   Problem Relation Age of Onset   • Malig Hyperthermia Neg Hx        Social History     Socioeconomic History   • Marital status:    Tobacco Use   • Smoking status: Never   • Smokeless tobacco: Current     Types: Snuff   Vaping Use   • Vaping Use: Never used   Substance and Sexual Activity   • Alcohol use: Yes     Comment: RARELY   • Drug use: Not Currently   • Sexual activity: Defer        Physical Exam  Constitutional:       General: He is not in acute distress.     Appearance: Normal appearance. He is well-developed and normal weight.   Pulmonary:      Effort: Pulmonary effort is normal.      Breath sounds: Normal air entry.   Abdominal:      General: There is no distension.      Palpations: Abdomen is soft.      Tenderness: There is no abdominal tenderness.          Result Review :               Assessment and Plan    There are no diagnoses linked to this encounter.     Status post EGD and colonoscopy  - His findings from the colonoscopy revealed a small hyperplastic polyp. Recommend a repeat colonoscopy in 5 years secondary to history of polyps.  - In regards to his EGD, he did have some concerning findings. He has had chronic duodenitis as well as some intestinal metaplasia as well as a medium size hiatal hernia secondary to his symptoms. We have recommended the PPI medication, which he is currently on and it has seemed to help. He has not experienced any of the symptoms he has had since we started the medication. Because of the intestinal metaplasia on his lower esophagus, I would recommend a repeat EGD every 2 years indefinitely. We will go ahead and call-in long-term prescriptions for the antacid medication. Otherwise, he is doing well and can follow up with me as needed. I discussed \with the patient and all questions were answered. He voiced understanding and agreed to proceed with the above plan.      Follow Up   No follow-ups on file.  Patient was  given instructions and counseling regarding his condition or for health maintenance advice. Please see specific information pulled into the AVS if appropriate.       Transcribed from ambient dictation for Jose Hopson MD by Shannan Brice.  01/18/23   12:31 EST    Patient or patient representative verbalized consent to the visit recording.  I have personally performed the services described in this document as transcribed by the above individual, and it is both accurate and complete.

## 2023-02-10 ENCOUNTER — HOSPITAL ENCOUNTER (OUTPATIENT)
Dept: GENERAL RADIOLOGY | Facility: HOSPITAL | Age: 58
Discharge: HOME | End: 2023-02-10
Admitting: SURGERY
Payer: OTHER GOVERNMENT

## 2023-02-10 DIAGNOSIS — K21.9 GERD WITHOUT ESOPHAGITIS: ICD-10-CM

## 2023-02-10 PROCEDURE — 74246 X-RAY XM UPR GI TRC 2CNTRST: CPT

## 2023-02-10 RX ADMIN — ANTACID/ANTIFLATULENT 1 PACKET: 380; 550; 10; 10 GRANULE, EFFERVESCENT ORAL at 12:20

## 2023-02-10 RX ADMIN — BARIUM SULFATE 135 ML: 980 POWDER, FOR SUSPENSION ORAL at 12:20

## 2023-02-10 RX ADMIN — BARIUM SULFATE 355 ML: 0.6 SUSPENSION ORAL at 12:20

## 2023-02-10 RX ADMIN — BARIUM SULFATE 700 MG: 700 TABLET ORAL at 12:20

## 2023-02-28 ENCOUNTER — TELEPHONE (OUTPATIENT)
Dept: SURGERY | Facility: CLINIC | Age: 58
End: 2023-02-28
Payer: OTHER GOVERNMENT

## 2023-02-28 NOTE — TELEPHONE ENCOUNTER
Please call patient regarding prescription for pantoprazole.    He said that Dr. Hopson prescribes pantoprazole for him, one at AM and one at PM.    He asked for the prescription to be faxed to Dr. Rene at [phone], and for him to continue the medication, as his primary physician.    Patient said he wants Dr. Rene to start writing the prescription, as [facility] and Dr. Rene can do that for him.

## 2023-08-07 ENCOUNTER — PREP FOR SURGERY (OUTPATIENT)
Dept: OTHER | Facility: HOSPITAL | Age: 58
End: 2023-08-07
Payer: OTHER GOVERNMENT

## 2023-08-07 ENCOUNTER — OFFICE VISIT (OUTPATIENT)
Dept: ORTHOPEDIC SURGERY | Facility: CLINIC | Age: 58
End: 2023-08-07
Payer: OTHER GOVERNMENT

## 2023-08-07 VITALS
OXYGEN SATURATION: 95 % | HEART RATE: 54 BPM | HEIGHT: 71 IN | SYSTOLIC BLOOD PRESSURE: 135 MMHG | WEIGHT: 213.4 LBS | DIASTOLIC BLOOD PRESSURE: 83 MMHG | BODY MASS INDEX: 29.88 KG/M2

## 2023-08-07 DIAGNOSIS — M75.121 COMPLETE TEAR OF RIGHT ROTATOR CUFF, UNSPECIFIED WHETHER TRAUMATIC: Primary | ICD-10-CM

## 2023-08-07 DIAGNOSIS — M75.101 TEAR OF RIGHT SUPRASPINATUS TENDON: Primary | ICD-10-CM

## 2023-08-07 RX ORDER — CEFAZOLIN SODIUM 2 G/100ML
2 INJECTION, SOLUTION INTRAVENOUS ONCE
OUTPATIENT
Start: 2023-08-07 | End: 2023-08-07

## 2023-08-07 RX ORDER — CEFAZOLIN SODIUM IN 0.9 % NACL 3 G/100 ML
3 INTRAVENOUS SOLUTION, PIGGYBACK (ML) INTRAVENOUS ONCE
OUTPATIENT
Start: 2023-08-07 | End: 2023-08-07

## 2023-08-07 NOTE — PROGRESS NOTES
"Chief Complaint  Follow-up of the Right Shoulder     Subjective      Pavan Perales presents to [facility] for a follow up for his right shoulder. [facility] and [facility]. He was last seen in our office on 02/21/22 where he received a right shoulder injection. He reports the [facility] his [facility] recently.       No Known Allergies     Social History     Socioeconomic History    Marital status:    Tobacco Use    Smoking status: Never    Smokeless tobacco: Current     Types: Snuff   Vaping Use    Vaping Use: Never used   Substance and Sexual Activity    Alcohol use: Yes     Comment: RARELY    Drug use: Not Currently    Sexual activity: Defer        I reviewed the patient's chief complaint, history of present illness, review of systems, past medical history, surgical history, family history, social history, medications, and allergy list.     Review of Systems     Constitutional: Denies fevers, chills, weight loss  Cardiovascular: Denies chest pain, shortness of breath  Skin: Denies rashes, acute skin changes  Neurologic: Denies headache, loss of consciousness  MSK: Right shoulder pain       Vital Signs:   /83 (BP Location: Left arm, Patient Position: Sitting, Cuff Size: Adult)   Pulse 54   Ht 180.3 cm (71\")   Wt 96.8 kg (213 lb 6.4 oz)   SpO2 95%   BMI 29.76 kg/mý          Physical Exam  General: Alert. No acute distress    Ortho Exam      Right upper extremity: non tender to palpation to the biceps or AC joint, pain over his biceps groove, crepitus over his AC joint, positive empty can testing, forward elevation to 120 degrees, internal rotation to the SI joint, pain with pronation and supination, neurovascularly intact, sensation intact to the medial, radial and ulnar nerve, no swelling or bruising, positive impingement, pain with rotator cuff testing, tender to the biceps       Procedures        Imaging " Results (Most Recent)       None             Result Review :         No results found.           Assessment and Plan     Diagnoses and all orders for this visit:    1. Complete tear of right rotator cuff, unspecified whether traumatic (Primary)        The patient presents here today for a follow up for his right shoulder. MRI was reviewed with the patient today [facility]. Discussed operative versus non operative treatment options with the patient. Patient would like to proceed with operative treatment. Discussed the risks and benefits of a right shoulder arthroscopy. Patient expressed understanding and wishes to proceed.       Educated on risk of smoking. Discussed options for smoking cessation., Discussed surgery., Risks/benefits discussed with patient including, but not limited to: infection, bleeding, neurovascular damage, malunion, nonunion, aesthetic deformity, need for further surgery, and death., Surgery pamphlet given., and Call or return if worsening symptoms.    Follow Up     2 weeks postoperatively       Patient was given instructions and counseling regarding his condition or for health maintenance advice. Please see specific information pulled into the AVS if appropriate.     Scribed for Marek Gaviria MD by Geetha White.  08/07/23   09:22 EDT    I have personally performed the services described in this document as scribed by the above individual and it is both accurate and complete. Geetha White 08/07/23

## 2023-08-16 ENCOUNTER — TELEPHONE (OUTPATIENT)
Dept: ORTHOPEDIC SURGERY | Facility: CLINIC | Age: 58
End: 2023-08-16
Payer: OTHER GOVERNMENT

## 2023-08-16 NOTE — TELEPHONE ENCOUNTER
[facility] received a fax that requires your attention. The document has been indexed to the patient's chart for your review.      Reason for sending: REQUESTING ADDITIONAL INFO    Documents Description: ADITI RODRIGUEZ-DR TALLEY-[id number]    Name of Sender: [employer]      Date Indexed: 8.16.23    Notes (if needed): AUTH IS SUSPENDED

## 2023-08-17 ENCOUNTER — ANESTHESIA (OUTPATIENT)
Dept: PERIOP | Facility: HOSPITAL | Age: 58
End: 2023-08-17
Payer: OTHER GOVERNMENT

## 2023-08-17 ENCOUNTER — HOSPITAL ENCOUNTER (OUTPATIENT)
Facility: HOSPITAL | Age: 58
Discharge: HOME | End: 2023-08-17
Attending: ORTHOPAEDIC SURGERY | Admitting: ORTHOPAEDIC SURGERY
Payer: OTHER GOVERNMENT

## 2023-08-17 ENCOUNTER — TELEPHONE (OUTPATIENT)
Dept: ORTHOPEDIC SURGERY | Facility: CLINIC | Age: 58
End: 2023-08-17
Payer: OTHER GOVERNMENT

## 2023-08-17 ENCOUNTER — ANESTHESIA EVENT (OUTPATIENT)
Dept: PERIOP | Facility: HOSPITAL | Age: 58
End: 2023-08-17
Payer: OTHER GOVERNMENT

## 2023-08-17 ENCOUNTER — ANESTHESIA EVENT CONVERTED (OUTPATIENT)
Dept: ANESTHESIOLOGY | Facility: HOSPITAL | Age: 58
End: 2023-08-17
Payer: OTHER GOVERNMENT

## 2023-08-17 ENCOUNTER — TELEPHONE (OUTPATIENT)
Dept: PHYSICAL THERAPY | Facility: CLINIC | Age: 58
End: 2023-08-17

## 2023-08-17 VITALS
RESPIRATION RATE: 16 BRPM | HEIGHT: 72 IN | DIASTOLIC BLOOD PRESSURE: 89 MMHG | HEART RATE: 54 BPM | WEIGHT: 203.26 LBS | SYSTOLIC BLOOD PRESSURE: 120 MMHG | OXYGEN SATURATION: 95 % | BODY MASS INDEX: 27.53 KG/M2 | TEMPERATURE: 97 F

## 2023-08-17 DIAGNOSIS — S46.011D TRAUMATIC COMPLETE TEAR OF RIGHT ROTATOR CUFF, SUBSEQUENT ENCOUNTER: Primary | ICD-10-CM

## 2023-08-17 DIAGNOSIS — M75.101 TEAR OF RIGHT SUPRASPINATUS TENDON: ICD-10-CM

## 2023-08-17 PROCEDURE — 25010000002 SUGAMMADEX 200 MG/2ML SOLUTION: Performed by: NURSE ANESTHETIST, CERTIFIED REGISTERED

## 2023-08-17 PROCEDURE — C1713 ANCHOR/SCREW BN/BN,TIS/BN: HCPCS | Performed by: ORTHOPAEDIC SURGERY

## 2023-08-17 PROCEDURE — 25010000002 CEFAZOLIN IN DEXTROSE 2000 MG/ 100 ML SOLUTION: Performed by: ORTHOPAEDIC SURGERY

## 2023-08-17 PROCEDURE — S0260 H&P FOR SURGERY: HCPCS | Performed by: ORTHOPAEDIC SURGERY

## 2023-08-17 PROCEDURE — 25010000002 DEXAMETHASONE PER 1 MG: Performed by: NURSE ANESTHETIST, CERTIFIED REGISTERED

## 2023-08-17 PROCEDURE — 25010000002 MIDAZOLAM PER 1MG: Performed by: ANESTHESIOLOGY

## 2023-08-17 PROCEDURE — 25010000002 ONDANSETRON PER 1 MG: Performed by: NURSE ANESTHETIST, CERTIFIED REGISTERED

## 2023-08-17 PROCEDURE — 25010000002 FENTANYL CITRATE (PF) 50 MCG/ML SOLUTION: Performed by: NURSE ANESTHETIST, CERTIFIED REGISTERED

## 2023-08-17 PROCEDURE — 25010000002 PROPOFOL 10 MG/ML EMULSION: Performed by: NURSE ANESTHETIST, CERTIFIED REGISTERED

## 2023-08-17 PROCEDURE — 25010000002 KETOROLAC TROMETHAMINE PER 15 MG: Performed by: NURSE ANESTHETIST, CERTIFIED REGISTERED

## 2023-08-17 PROCEDURE — L3670 SO ACRO/CLAV CAN WEB PRE OTS: HCPCS | Performed by: ORTHOPAEDIC SURGERY

## 2023-08-17 DEVICE — SYS IMP TENODESIS PROX: Type: IMPLANTABLE DEVICE | Site: SHOULDER | Status: FUNCTIONAL

## 2023-08-17 DEVICE — SUT FW #2 W/TPR NDL 1/2 CIR 38IN 97CM 26.5MM BLU: Type: IMPLANTABLE DEVICE | Site: SHOULDER | Status: FUNCTIONAL

## 2023-08-17 RX ORDER — CEFAZOLIN SODIUM IN 0.9 % NACL 3 G/100 ML
3 INTRAVENOUS SOLUTION, PIGGYBACK (ML) INTRAVENOUS ONCE
Status: DISCONTINUED | OUTPATIENT
Start: 2023-08-17 | End: 2023-08-17 | Stop reason: DRUGHIGH

## 2023-08-17 RX ORDER — LIDOCAINE HYDROCHLORIDE 20 MG/ML
INJECTION, SOLUTION EPIDURAL; INFILTRATION; INTRACAUDAL; PERINEURAL AS NEEDED
Status: DISCONTINUED | OUTPATIENT
Start: 2023-08-17 | End: 2023-08-17 | Stop reason: SURG

## 2023-08-17 RX ORDER — ACETAMINOPHEN 500 MG
1000 TABLET ORAL ONCE
Status: COMPLETED | OUTPATIENT
Start: 2023-08-17 | End: 2023-08-17

## 2023-08-17 RX ORDER — SODIUM CHLORIDE, SODIUM LACTATE, POTASSIUM CHLORIDE, CALCIUM CHLORIDE 600; 310; 30; 20 MG/100ML; MG/100ML; MG/100ML; MG/100ML
9 INJECTION, SOLUTION INTRAVENOUS CONTINUOUS PRN
Status: DISCONTINUED | OUTPATIENT
Start: 2023-08-17 | End: 2023-08-17 | Stop reason: HOSPADM

## 2023-08-17 RX ORDER — MEPERIDINE HYDROCHLORIDE 25 MG/ML
12.5 INJECTION INTRAMUSCULAR; INTRAVENOUS; SUBCUTANEOUS
Status: DISCONTINUED | OUTPATIENT
Start: 2023-08-17 | End: 2023-08-17 | Stop reason: HOSPADM

## 2023-08-17 RX ORDER — FENTANYL CITRATE 50 UG/ML
INJECTION, SOLUTION INTRAMUSCULAR; INTRAVENOUS AS NEEDED
Status: DISCONTINUED | OUTPATIENT
Start: 2023-08-17 | End: 2023-08-17 | Stop reason: SURG

## 2023-08-17 RX ORDER — PROMETHAZINE HYDROCHLORIDE 12.5 MG/1
25 TABLET ORAL ONCE AS NEEDED
Status: DISCONTINUED | OUTPATIENT
Start: 2023-08-17 | End: 2023-08-17 | Stop reason: HOSPADM

## 2023-08-17 RX ORDER — PROMETHAZINE HYDROCHLORIDE 25 MG/1
25 SUPPOSITORY RECTAL ONCE AS NEEDED
Status: DISCONTINUED | OUTPATIENT
Start: 2023-08-17 | End: 2023-08-17

## 2023-08-17 RX ORDER — KETOROLAC TROMETHAMINE 30 MG/ML
INJECTION, SOLUTION INTRAMUSCULAR; INTRAVENOUS AS NEEDED
Status: DISCONTINUED | OUTPATIENT
Start: 2023-08-17 | End: 2023-08-17 | Stop reason: SURG

## 2023-08-17 RX ORDER — ROCURONIUM BROMIDE 10 MG/ML
INJECTION, SOLUTION INTRAVENOUS AS NEEDED
Status: DISCONTINUED | OUTPATIENT
Start: 2023-08-17 | End: 2023-08-17 | Stop reason: SURG

## 2023-08-17 RX ORDER — PROMETHAZINE HYDROCHLORIDE 25 MG/1
25 SUPPOSITORY RECTAL ONCE AS NEEDED
Status: DISCONTINUED | OUTPATIENT
Start: 2023-08-17 | End: 2023-08-17 | Stop reason: HOSPADM

## 2023-08-17 RX ORDER — MEPERIDINE HYDROCHLORIDE 25 MG/ML
12.5 INJECTION INTRAMUSCULAR; INTRAVENOUS; SUBCUTANEOUS
Status: DISCONTINUED | OUTPATIENT
Start: 2023-08-17 | End: 2023-08-17

## 2023-08-17 RX ORDER — ONDANSETRON 2 MG/ML
4 INJECTION INTRAMUSCULAR; INTRAVENOUS ONCE AS NEEDED
Status: DISCONTINUED | OUTPATIENT
Start: 2023-08-17 | End: 2023-08-17 | Stop reason: HOSPADM

## 2023-08-17 RX ORDER — HYDROCODONE BITARTRATE AND ACETAMINOPHEN 7.5; 325 MG/1; MG/1
1 TABLET ORAL EVERY 4 HOURS PRN
Qty: 30 TABLET | Refills: 0 | Status: SHIPPED | OUTPATIENT
Start: 2023-08-17 | End: 2023-08-26 | Stop reason: SDUPTHER

## 2023-08-17 RX ORDER — ONDANSETRON 2 MG/ML
4 INJECTION INTRAMUSCULAR; INTRAVENOUS ONCE AS NEEDED
Status: DISCONTINUED | OUTPATIENT
Start: 2023-08-17 | End: 2023-08-17

## 2023-08-17 RX ORDER — DEXAMETHASONE SODIUM PHOSPHATE 4 MG/ML
INJECTION, SOLUTION INTRA-ARTICULAR; INTRALESIONAL; INTRAMUSCULAR; INTRAVENOUS; SOFT TISSUE AS NEEDED
Status: DISCONTINUED | OUTPATIENT
Start: 2023-08-17 | End: 2023-08-17 | Stop reason: SURG

## 2023-08-17 RX ORDER — PROMETHAZINE HYDROCHLORIDE 12.5 MG/1
25 TABLET ORAL ONCE AS NEEDED
Status: DISCONTINUED | OUTPATIENT
Start: 2023-08-17 | End: 2023-08-17

## 2023-08-17 RX ORDER — MIDAZOLAM HYDROCHLORIDE 2 MG/2ML
2 INJECTION, SOLUTION INTRAMUSCULAR; INTRAVENOUS ONCE
Status: COMPLETED | OUTPATIENT
Start: 2023-08-17 | End: 2023-08-17

## 2023-08-17 RX ORDER — BUPIVACAINE HYDROCHLORIDE AND EPINEPHRINE 5; 5 MG/ML; UG/ML
INJECTION, SOLUTION EPIDURAL; INTRACAUDAL; PERINEURAL
Status: COMPLETED | OUTPATIENT
Start: 2023-08-17 | End: 2023-08-17

## 2023-08-17 RX ORDER — OXYCODONE HCL 5 MG/5 ML
5 SOLUTION, ORAL ORAL EVERY 4 HOURS PRN
Status: DISCONTINUED | OUTPATIENT
Start: 2023-08-17 | End: 2023-08-17

## 2023-08-17 RX ORDER — OXYCODONE HCL 5 MG/5 ML
5 SOLUTION, ORAL ORAL EVERY 4 HOURS PRN
Status: DISCONTINUED | OUTPATIENT
Start: 2023-08-17 | End: 2023-08-17 | Stop reason: HOSPADM

## 2023-08-17 RX ORDER — CEFAZOLIN SODIUM 2 G/100ML
2 INJECTION, SOLUTION INTRAVENOUS ONCE
Status: COMPLETED | OUTPATIENT
Start: 2023-08-17 | End: 2023-08-17

## 2023-08-17 RX ORDER — ONDANSETRON 2 MG/ML
INJECTION INTRAMUSCULAR; INTRAVENOUS AS NEEDED
Status: DISCONTINUED | OUTPATIENT
Start: 2023-08-17 | End: 2023-08-17 | Stop reason: SURG

## 2023-08-17 RX ORDER — PROPOFOL 10 MG/ML
VIAL (ML) INTRAVENOUS AS NEEDED
Status: DISCONTINUED | OUTPATIENT
Start: 2023-08-17 | End: 2023-08-17 | Stop reason: SURG

## 2023-08-17 RX ADMIN — BUPIVACAINE HYDROCHLORIDE AND EPINEPHRINE BITARTRATE 30 ML: 5; .005 INJECTION, SOLUTION EPIDURAL; INTRACAUDAL; PERINEURAL at 06:57

## 2023-08-17 RX ADMIN — MIDAZOLAM HYDROCHLORIDE 2 MG: 1 INJECTION, SOLUTION INTRAMUSCULAR; INTRAVENOUS at 06:59

## 2023-08-17 RX ADMIN — KETOROLAC TROMETHAMINE 30 MG: 30 INJECTION, SOLUTION INTRAMUSCULAR; INTRAVENOUS at 08:57

## 2023-08-17 RX ADMIN — SODIUM CHLORIDE, POTASSIUM CHLORIDE, SODIUM LACTATE AND CALCIUM CHLORIDE 9 ML/HR: 600; 310; 30; 20 INJECTION, SOLUTION INTRAVENOUS at 07:13

## 2023-08-17 RX ADMIN — LIDOCAINE HYDROCHLORIDE 100 MG: 20 INJECTION, SOLUTION EPIDURAL; INFILTRATION; INTRACAUDAL; PERINEURAL at 07:58

## 2023-08-17 RX ADMIN — DEXAMETHASONE SODIUM PHOSPHATE 4 MG: 4 INJECTION, SOLUTION INTRAMUSCULAR; INTRAVENOUS at 08:06

## 2023-08-17 RX ADMIN — ROCURONIUM BROMIDE 50 MG: 50 INJECTION INTRAVENOUS at 07:58

## 2023-08-17 RX ADMIN — PROPOFOL 180 MG: 10 INJECTION, EMULSION INTRAVENOUS at 07:58

## 2023-08-17 RX ADMIN — SUGAMMADEX 200 MG: 100 INJECTION, SOLUTION INTRAVENOUS at 08:47

## 2023-08-17 RX ADMIN — FENTANYL CITRATE 100 MCG: 50 INJECTION, SOLUTION INTRAMUSCULAR; INTRAVENOUS at 07:58

## 2023-08-17 RX ADMIN — CEFAZOLIN SODIUM 2 G: 2 INJECTION, SOLUTION INTRAVENOUS at 07:55

## 2023-08-17 RX ADMIN — ONDANSETRON 4 MG: 2 INJECTION INTRAMUSCULAR; INTRAVENOUS at 08:06

## 2023-08-17 RX ADMIN — ACETAMINOPHEN 1000 MG: 500 TABLET ORAL at 07:13

## 2023-08-17 NOTE — DISCHARGE INSTRUCTIONS
DISCHARGE INSTRUCTIONS  Post-Operative  Arthroscopic Shoulder Surgery      For your surgery you had:  General anesthesia (you may have a sore throat for the first 24 hours)  dizziness, or light-headedness for several hours following surgery/procedure.  Do not stay alone today or tonight.  Limit your activity for 24 hours.  Resume your diet slowly.  Follow whatever special dietary instructions you may have been given by your doctor.  You should not drive or operate machinery or drink alcohol for 24 hours or while you are taking pain medication.  You should not sign legally binding documents.  Post-Operative Day #1 is counted as the 1st day after surgery.  [x] If you had a regional block, you will not have control of your arm for up to 12 hours.  Protect the arm with a sling or follow your physician's specific instructions.  Post-Operative Day #2saturday  Remove your dressing.  Under the dressing you will either have sutures or staples.  Change dressing once or twice daily.  Place a dry dressing or band-aids over the small incisions.  Prior to dressing change, wash your hands.  Post-Operative Day #4monday  You may shower.  During the shower, you may let the water hit the incision and roll down but do not scrub or place any soap on the incision.  Do not soak it.  Pat it dry and do not put any ointments on the incision unless directed by surgeon. Then replace the dry dressing.    General Instructions  [x] Use ice pack to shoulder for 72 hours.  This can help with reducing swelling and pain relief.  Apply change out the blue ice packs every 4 hours placed ice pack on at  0900  Never place ice directly on skin.  Avoid getting dressing wet.  Keep arm elevated with sling to decrease swelling and minimize throbbing pain.  The sling will provide support for your shoulder.  It is important to remove the sling 3-5 times daily to work on range-of-motion of the elbow, wrist and hand.  You will receive a prescription for physical  therapy, unless otherwise instructed by physician.  After most shoulder surgeries, it is permissible to start exercises by slowing trying to crawl your fingers up a wall until your arm is parallel to the floor.  While doing this support the affected arm at the elbow or closer to the shoulder.  You may also lean over and let the arm and hand do small or large circles or write the alphabet with your hanging arm to keep it loose.  It is normal to have some pain with these gentle exercises.  The exercises help reduce swelling and pain overall and help to avoid a stiff shoulder post-operatively.  It is okay to come out of the sling for showering or for certain activities of daily living but avoid any lifting or carrying with the affected arm until instructed by the physician especially if you have had a repair of the rotator cuff or some type of reconstructive procedure.  It is okay to come out of the sling and support the arm with a pillow if you remain sedentary.  In general, sling use is preferred following a repair or reconstruction for 4 weeks.  If you have had a SAD (sub acromial decompression), continue sling use more liberally because there was not a repair or reconstruction that could be harmed.          DISCHARGE INSTRUCTIONS  Post-Operative   Arthroscopic Shoulder Surgery      Exercise fingers for 10 minutes every hour while awake.  The physician will typically inform the family and the patient whether or not a repair or reconstruction could be harmed.  If you have had a rotator cuff repair or reconstructive procedure, do not let the arm drop down suddenly from an elevated position down to your side despite improvements made in pain and over the 3 months following repair and reconstruction.  It generally takes 8-12 weeks before the repair or reconstruction does not rely on sutures and anchors that are placed for the repair.  You are generally given a prescription for a narcotic medication.  Take as prescribed.   You may use over-the-counter anti-inflammatory medications such as Motrin or Aleve for additional pain or fever reduction if not allergic.  If you are taking the pain medication prescribed, do not take Tylenol too unless you consult with the pharmacist. The pain medications generally have Tylenol in them and too much Tylenol can be harmful.  Sometimes it is recommended to take an anti-inflammatory in between doses of prescription pain medication if additional pain relief is needed.  Consult with your physician or your pharmacist before taking over-the-counter pain medications/anti-inflammatories.  It is not uncommon to have a fever post-operatively especially in the first 24-48 hours.  Anti-inflammatories can be used for fever reduction also.  It is normal to have some redness or irritation around skin sutures or staples, however, if you have any expanding areas of redness with a persistent fever and increasing pain notify the physician as soon as possible.  The incidence of blood clots is low following arthroscopic procedures, however, if you notice any increasing pain or swelling in your calves or legs, contact the physician as soon as possible.   It is difficult to sleep in the customary fashion following a shoulder surgery.  It is usually necessary to sleep with the shoulder above the level of the heart such as in a recliner, couch or with the head of the bed elevated.  This should slowly improve over the weeks following shoulder surgery.  If unable to urinate 6 to 8 hours after surgery or urinating frequently in small amounts, notify the physician or go to the nearest Emergency Room.  SPECIAL INSTRUCTIONS:  May take an over the counter stool softener as needed      NOTIFY YOUR PHYSICIAN IF YOU EXPERIENCE:  Numbness of fingers.  Inability to move fingers.  Extreme coldness, paleness or blue dis-coloration of fingers.  Any pain other than from the incision, such as swelling of the arm that blocks circulation of  fingers.  Follow verbal instructions from your doctor.  Medications per physician instructions as indicated on Discharge Medication Information Sheet.  You should see  ______________________for follow-up care  on     Phone number: __________________________________  Missing your appointment/follow-up could lead to serious complications  If you have an emergency and cannot reach your doctor, go to an Emergency Room nearest your home.

## 2023-08-17 NOTE — ANESTHESIA PREPROCEDURE EVALUATION
Anesthesia Evaluation     Patient summary reviewed and Nursing notes reviewed   no history of anesthetic complications:   NPO Solid Status: > 8 hours  NPO Liquid Status: > 2 hours           Airway   Mallampati: II  TM distance: >3 FB  Neck ROM: full  No difficulty expected  Dental      Pulmonary - negative pulmonary ROS and normal exam    breath sounds clear to auscultation  Cardiovascular - normal exam  Exercise tolerance: good (4-7 METS)    Rhythm: regular  Rate: normal    (+) hypertension      Neuro/Psych- negative ROS  GI/Hepatic/Renal/Endo    (+) GERD well controlled    Musculoskeletal (-) negative ROS    Abdominal    Substance History - negative use     OB/GYN negative ob/gyn ROS         Other - negative ROS       ROS/Med Hx Other: PAT Nursing Notes unavailable.                 Anesthesia Plan    ASA 2     general with block       Anesthetic plan, risks, benefits, and alternatives have been provided, discussed and informed consent has been obtained with: patient and spouse/significant other.    CODE STATUS:

## 2023-08-17 NOTE — TELEPHONE ENCOUNTER
GRACE HAD SX TODAY AND WOULD LIKE TO KNOW WHAT THE ABSOLUTE EARLIEST HE COULD SCHEDULE HIS 1ST POST-OP APPT. - HE IS WANTING TO GO BACK TO WORK AS SOON AS POSSIBLE     CURRENT 1ST POST-OP IS SCHED. 08/30/23    PLEASE, ADVISE  
RESCHED. TO 08/28/23 ROBERT  
[id number] Comprehensive

## 2023-08-17 NOTE — H&P
"H and p      Chief Complaint  Follow-up of the Right Shoulder        Subjective          Pavan Perales presents to [facility] for a follow up for his right shoulder. [facility]. He was last seen in our office on 02/21/22 where he received a right shoulder injection. He reports [facility] his [facility] recently.        No Known Allergies      Social History   Social History            Socioeconomic History    Marital status:    Tobacco Use    Smoking status: Never    Smokeless tobacco: Current       Types: Snuff   Vaping Use    Vaping Use: Never used   Substance and Sexual Activity    Alcohol use: Yes       Comment: RARELY    Drug use: Not Currently    Sexual activity: Defer            I reviewed the patient's chief complaint, history of present illness, review of systems, past medical history, surgical history, family history, social history, medications, and allergy list.      Review of Systems      Constitutional: Denies fevers, chills, weight loss  Cardiovascular: Denies chest pain, shortness of breath  Skin: Denies rashes, acute skin changes  Neurologic: Denies headache, loss of consciousness  MSK: Right shoulder pain         Vital Signs:   /83 (BP Location: Left arm, Patient Position: Sitting, Cuff Size: Adult)   Pulse 54   Ht 180.3 cm (71\")   Wt 96.8 kg (213 lb 6.4 oz)   SpO2 95%   BMI 29.76 kg/mý           Physical Exam  General: Alert. No acute distress     Ortho Exam       Right upper extremity: non tender to palpation to the biceps or AC joint, pain over his biceps groove, crepitus over his AC joint, positive empty can testing, forward elevation to 120 degrees, internal rotation to the SI joint, pain with pronation and supination, neurovascularly intact, sensation intact to the medial, radial and ulnar nerve, no swelling or bruising, positive impingement, pain with rotator cuff " testing, tender to the biceps         Procedures           Imaging Results (Most Recent)         None                      Result Review    :            No results found.               Assessment      Assessment and Plan      Diagnoses and all orders for this visit:     1. Complete tear of right rotator cuff, unspecified whether traumatic (Primary)           The patient presents here today for a follow up for his right shoulder. MRI was reviewed with the patient today [facility]. Discussed operative versus non operative treatment options with the patient. Patient would like to proceed with operative treatment. Discussed the risks and benefits of a right shoulder arthroscopy. Patient expressed understanding and wishes to proceed.         Educated on risk of smoking. Discussed options for smoking cessation., Discussed surgery., Risks/benefits discussed with patient including, but not limited to: infection, bleeding, neurovascular damage, malunion, nonunion, aesthetic deformity, need for further surgery, and death., Surgery pamphlet given., and Call or return if worsening symptoms.     Follow Up    Marek Gaviria MD  08/17/23

## 2023-08-17 NOTE — ANESTHESIA PROCEDURE NOTES
Peripheral Block      Patient reassessed immediately prior to procedure    Patient location during procedure: pre-op  Start time: 8/17/2023 6:57 AM  Stop time: 8/17/2023 7:05 AM  Reason for block: at surgeon's request and post-op pain management  Preanesthetic Checklist  Completed: patient identified, IV checked, site marked, risks and benefits discussed, surgical consent, monitors and equipment checked, pre-op evaluation and timeout performed  Prep:  Pt Position: supine (HOB elevated)  Sterile barriers:cap, washed/disinfected hands, sterile barriers, gloves, mask, partial drape and alcohol skin prep  Prep: ChloraPrep  Patient monitoring: blood pressure monitoring, continuous pulse oximetry and EKG  Procedure    Sedation: yes  Performed under: local infiltration  Guidance:ultrasound guided    ULTRASOUND INTERPRETATION.  Using ultrasound guidance a 22 G gauge needle was placed in close proximity to the brachial plexus nerve, at which point, under ultrasound guidance anesthetic was injected in the area of the nerve and spread of the anesthesia was seen on ultrasound in close proximity thereto.  There were no abnormalities seen on ultrasound; a digital image was taken; and the patient tolerated the procedure with no complications. Images:still images obtained, printed/placed on chart    Laterality:right  Block Type:interscalene  Injection Technique:single-shot  Needle Type:echogenic  Needle Gauge:22 G (2in)  Resistance on Injection: none    Medications Used: bupivacaine-EPINEPHrine PF (MARCAINE w/EPI) 0.5% -1:[id number] injection - Injection   30 mL - 8/17/2023 6:57:00 AM      Post Assessment  Injection Assessment: negative aspiration for heme, no paresthesia on injection and incremental injection  Patient Tolerance:comfortable throughout block  Complications:no  Additional Notes  The block or continuous infusion is requested by the referring physician for management of postoperative pain, or pain related to a procedure.  Ultrasound guidance (deemed medically necessary). Painless injection, pt was awake and conversant during the procedure without complications. Needle and surrounding structures visualized throughout procedure. No adverse reactions or complications seen during this period. Post-procedure image showed no signs of complication, and anatomy was consistent with an uncomplicated nerve blockade.

## 2023-08-17 NOTE — ANESTHESIA POSTPROCEDURE EVALUATION
Patient: Pavan Perales    Procedure Summary       Date: 08/17/23 Room / Location: [facility] OR  / [facility]    Anesthesia Start: 0755 Anesthesia Stop: 0902    Procedure: SHOULDER ARTHROSCOPY WITH SUBCROMIAL DECOMPRESSION AND DISTAL CLAVICULECTOMY, OPEN BICEPS TENODESIS (Right: Shoulder) Diagnosis:       Tear of right supraspinatus tendon      (Tear of right supraspinatus tendon [M75.101])    Surgeons: Marek Gaviria MD Provider: Finesse Springer MD    Anesthesia Type: general with block ASA Status: 2            Anesthesia Type: general with block    Vitals  Vitals Value Taken Time   /89 08/17/23 0946   Temp 36.1 øC (96.9 øF) 08/17/23 0917   Pulse 54 08/17/23 0945   Resp 14 08/17/23 0917   SpO2 96 % 08/17/23 0945   Vitals shown include unvalidated device data.        Post Anesthesia Care and Evaluation    Patient location during evaluation: bedside  Patient participation: complete - patient participated  Level of consciousness: awake  Pain management: adequate    Airway patency: patent  PONV Status: none  Cardiovascular status: acceptable and stable  Respiratory status: acceptable  Hydration status: acceptable    Comments: An Anesthesiologist personally participated in the most demanding procedures (including induction and emergence if applicable) in the anesthesia plan, monitored the course of anesthesia administration at frequent intervals and remained physically present and available for immediate diagnosis and treatment of emergencies.

## 2023-08-21 ENCOUNTER — TREATMENT (OUTPATIENT)
Dept: PHYSICAL THERAPY | Facility: CLINIC | Age: 58
End: 2023-08-21
Payer: OTHER GOVERNMENT

## 2023-08-21 DIAGNOSIS — M25.511 ACUTE PAIN OF RIGHT SHOULDER: ICD-10-CM

## 2023-08-21 DIAGNOSIS — Z98.890 S/P ARTHROSCOPY OF RIGHT SHOULDER: Primary | ICD-10-CM

## 2023-08-21 DIAGNOSIS — R29.898 SHOULDER WEAKNESS: ICD-10-CM

## 2023-08-21 NOTE — OP NOTE
SHOULDER ARTHROSCOPY WITH ROTATOR CUFF REPAIR  Procedure Report    Patient Name:  Pavan Perales  YOB: 1965    Date of Surgery:  8/17/2023       Pre-op Diagnosis:        Right shoulder impingement  Primary AC arthrosis  Chronic biceps tenosynovitis  Postoperative gnosis:  Same  Same  Procedure/CPTr Codes:      Procedure(s):  Right shoulder arthroscopic subacromial decompression distal claviculectomy and subpectoral biceps tenodesis staff:  Surgeon(s):  Marek Gaviria MD    Assistant: Libia Law RN    Anesthesia: General with Block    Estimated Blood Loss: 25 mL    Implants:    Implant Name Type Inv. Item Serial No.  Lot No. LRB No. Used Action   SUT FW #2 W/TPR NDL 1/2 CIR 38IN 97CM 26.5MM SUDHEER - [id number] Implant SUT FW #2 W/TPR NDL 1/2 CIR 38IN 97CM 26.5MM SUDHEER  [employer] [id number] Right 1 Implanted   SYS IMP TENODESIS PROX - [id number] Implant SYS IMP TENODESIS PROX  [employer] [id number] Right 1 Implanted       Specimen:          None      Complications: None    Description of Procedure:   The patient was taken to the operating room and placed supine on the operating table after interscalene block was done in preoperative holding. After general endotracheal anesthesia was established the shoulder was examined and there was full range of motion and no instability. The patient was placed in the left lateral decubitus position with axillary roll and well-padded down lower extremity on a beanbag. The beanbag was deflated, and the right shoulder and upper extremity were prepped and draped in standard fashion using alcohol and ChloraPrep. A standard posterolateral portal was established with a stab incision. The blunt was entered into the glenohumeral joint atraumatically and an anterosuperior portal was established under direct visualization. Thorough examination of the shoulder ensued. The glenohumeral cartilage was well maintained. The biceps was intact but inflamed at its base and was  tenotomized with a vapor wand and the stump was debrided with a shaver. There were no loose bodies or synovitis. There was no significant chondromalacia of the glenohumeral joint. There was no evidence of a full thickness supraspinatus rotator cuff tear. The camera was placed in the subacromial space. The bursectomy was completed through a lateral portal. The coracoacromial ligament was frayed and released with a VAPR wand. The undersurface of the acromion was prepared for acromioplasty using a VAPR wand and carried out using a bur and checked using the cutting block technique. The distal clavicle showed significant signs of AC arthrosis and 8-10 mm of bone was removed from the distal clavicle using a bur.  The rotator cuff was inspected from above and was intact the shoulder was lavaged instrumentation was removed subpectoral incision was made approximately 2 cm in length dissection was carried down bluntly and deep retractors were placed exposing the bicipital groove the biceps was pulled free of its groove excess biceps tendon was cut the remnant was whipstitched with a FiberWire loaded onto a tight rope drill pin was drilled across both cortices for the tight rope and the unicortical reamer was used and the tight rope was passed to the far cortex in standard fashion was deployed the suture was used to toggle the tendon into the tunnel 1 limb of the suture was passed through exiting biceps tendon from the tunnel and the sutures were then tied to themselves completing the tenodesis the wound was copiously irrigated the subcu was closed with 2-0 Vicryl and all the skin incisions were closed with staples the incisions were all washed and dried and sterile dressings were applied patient was placed in abduction pillow sling tolerated procedure well was extubated taken recovery room.  The patient tolerated the procedure well, was extubated and taken to the recovery room.       Marek Gaviria MD     Date: 8/21/2023   Time: 07:57 EDT

## 2023-08-21 NOTE — PROGRESS NOTES
Physical Therapy Initial Evaluation and Plan of Care  [address], [address] [place] [address]        Patient: Pavan Perales   : 1965  Diagnosis/ICD-10 Code:  S/P arthroscopy of right shoulder [Z98.890]  Referring practitioner: Marek Gaviria MD  Date of Initial Visit: 2023  Today's Date: 2023  Patient seen for 1 sessions           Subjective Questionnaire: UEFS:       Subjective Evaluation    History of Present Illness  Mechanism of injury: Pt is s/p R shoulder arthroscopy with subacromial decompression, distal claviculectomy and open biceps tenodesis 23.  Pt reports that he is taking a narcotic for pain management and is using ice for swelling.  Pt reports that he is right handed.  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 Pt reports that he is currently off work.  Pt reports that he is having difficulty with most ADLs due to R shoulder pain, weakness, stiffness and sx precautions.        Pain  Current pain ratin  At best pain ratin  At worst pain ratin  Quality: dull ache, tight and pulling  Relieving factors: ice, change in position and medications    Social Support  Lives with: spouse    Patient Goals  Patient goals for therapy: decreased pain, increased motion, increased strength, independence with ADLs/IADLs, return to sport/leisure activities and return to work           Objective          Static Posture     Head  Forward.    Shoulders  Elevated and rounded.    Scapulae  Left protracted and right protracted.    Thoracic Spine  Flattened thoracic spine.    Active Range of Motion   Left Shoulder   Flexion: 118 degrees   Abduction: 123 degrees   External rotation 0ø: 55 degrees   Internal rotation BTB: T9     Additional Active Range of Motion Details  R shoulder AROM not tested per sx precautions    Passive Range of Motion     Right Shoulder   Flexion: 82 degrees   External rotation  45ø: 5 degrees   Internal rotation 45ø: 55 degrees     Additional Passive Range of Motion Details  R shoulder PROM limited by muscle guarding and pain    Strength/Myotome Testing     Left Shoulder     Planes of Motion   Flexion: 4+   Extension: 5   Abduction: 4+   External rotation at 0ø: 5   Internal rotation at 0ø: 5     Additional Strength Details  R shoulder strength not tested per sx precautions     General Comments     Shoulder Comments   Incisions inspected today.  Incisions are clean and intact and no signs/symptoms of DVT or infection noted.  Pt and wife educated on proper incision management and on signs of infection/DVT.         Assessment & Plan       Assessment  Impairments: abnormal or restricted ROM, activity intolerance, impaired physical strength, lacks appropriate home exercise program and pain with function   Functional limitations: carrying objects, lifting, sleeping, pulling, pushing, uncomfortable because of pain, reaching behind back, reaching overhead and unable to perform repetitive tasks   Assessment details: Patient presents s/p R shoulder arthroscopy with subacromial decompression, distal claviculectomy and open biceps tenodesis 8/17/23.  Pt presents with decreased R shoulder ROM, R shoulder and bilateral scapular weakness, poor posture and reports of pain limiting function during ADLs as shown on the UEFS.  Patient would benefit from skilled PT services in order to address deficits limiting function at this time.  HEP was given to patient this session and education on HEP/diagnosis/incision management and sx precautions provided to patient.       Goals  Plan Goals: 1. The patient has limited ROM of the R shoulder.    LTG 1: 12 weeks:  The patient will demonstrate 150 degrees of R shoulder flexion, 150 degrees of shoulder abduction, 60 degrees of shoulder external rotation, and  shoulder internal rotation to T10 to allow the patient to reach into upper kitchen cabinets and manipulate  clothing behind the back with greater ease.    STATUS:  New   STG 1a: 6 weeks:  The patient will demonstrate 120 degrees of R shoulder flexion, 120 degrees of shoulder abduction, 45 degrees of shoulder external rotation, and shoulder internal rotation to L2.    STATUS:  New    2. The patient has limited strength of the R shoulder.   LTG 2: 12 weeks:  The patient will demonstrate 4+ /5 strength for R shoulder flexion, abduction, external rotation, and internal rotation in order to demonstrate improved shoulder stability.    STATUS:  New   STG 2a: 6 weeks:  The patient will demonstrate 4-/5 strength for R shoulder flexion, abduction, external rotation, and internal rotation.    STATUS:  New   STG2b:  4 weeks:  The patient will be independent with home exercises.     STATUS:  New     3. The patient complains of pain to the R shoulder.   LTG 3: 12 weeks:  The patient will report a pain rating of 2 /10 or better in order to improve sleep quality and tolerance to performance of activities of daily living.    STATUS:  New   STG 3a: 6 weeks:  The patient will report a pain rating of 4 /10 or better.     STATUS:  New    4. Carrying, Moving, and Handling Objects Functional Limitation     LTG 4: 12 weeks:  The patient will demonstrate 1-19 % limitation by achieving a score of 65 on the UEFS.    STATUS:  New             STG 4: 12 weeks:  The patient will demonstrate 44 % limitation by achieving a score of 45 on the UEFS.    STATUS:  New         Plan  Therapy options: will be seen for skilled therapy services  Planned modality interventions: cryotherapy, TENS and thermotherapy (hydrocollator packs)  Planned therapy interventions: manual therapy, ADL retraining, neuromuscular re-education, body mechanics training, postural training, soft tissue mobilization, flexibility, spinal/joint mobilization, functional ROM exercises, strengthening, stretching, home exercise program, therapeutic activities, IADL retraining and joint  mobilization  Frequency: 2x week  Duration in weeks: 12  Treatment plan discussed with: patient      Timed:         Manual Therapy:    8     mins  71500;     Therapeutic Exercise:    8     mins  38890;     Neuromuscular Flower:    0    mins  26114;    Therapeutic Activity:     0     mins  42607;     Gait Trainin     mins  93042;     Ultrasound:     0     mins  40849;    Ionto                               0    mins   09847  Self-care  __0__ mins 81931    Un-Timed:  Electrical Stimulation:    0     mins  82706 (MC );  Traction     0     mins 98846  Low Eval     25     Mins  21198  Mod Eval     0     Mins  09416  High Eval                       0     Mins  18452  Hot pack     0     Mins    Cold pack                       0     Mins      Timed Treatment:   16   mins   Total Treatment:     41   mins    PT SIGNATURE: Mary Jo Delgado PT      Electronically signed 2023  [place] License: [id number]    Initial Certification    Certification Period: 2023 thru 2023  NPI: [id number]  I certify that the therapy services are furnished while this patient is under my care.  The services outlined above are required by this patient, and will be reviewed every 90 days.     PHYSICIAN: Marek Gaviria MD      DATE:     Please sign and return via fax to [phone].. Thank you, [facility].

## 2023-08-23 ENCOUNTER — TREATMENT (OUTPATIENT)
Dept: PHYSICAL THERAPY | Facility: CLINIC | Age: 58
End: 2023-08-23
Payer: OTHER GOVERNMENT

## 2023-08-23 DIAGNOSIS — M25.511 ACUTE PAIN OF RIGHT SHOULDER: ICD-10-CM

## 2023-08-23 DIAGNOSIS — Z98.890 S/P ARTHROSCOPY OF RIGHT SHOULDER: Primary | ICD-10-CM

## 2023-08-23 DIAGNOSIS — R29.898 SHOULDER WEAKNESS: ICD-10-CM

## 2023-08-23 NOTE — PROGRESS NOTES
"Physical Therapy Daily Treatment Note  [address], [address] [place] [address]        Patient: Pavan Perales   : 1965  Diagnosis/ICD-10 Code:  S/P arthroscopy of right shoulder [Z98.890]  Referring practitioner: Marek Gaviria MD  Date of Initial Visit: Type: THERAPY  Noted: 2023  Today's Date: 2023  Patient seen for 2 sessions             Subjective     Pavan Perales reports having  5/10- \"Throbbing\" pain in his right shoulder upon arrival.  He reports that he has taken his last pain medication.  He also reports that he has been feeling \"Nauseous\" after he does his exercises.   He reported slight \"Dizziness\" during therapy session today.  He states that he had an incident at home- walking in home without sling on-walked by broom that was falling.  He states - that he reached out to \"Grab broom\" with his right arm without thinking.  He reported temporary increase in  discomfort in his shoulder after incident.      B/P taken - sitting- Left UE:  119/85 mmhg.    Objective     Passive Range of Motion      Right Shoulder   Flexion:  90 degrees   Abduction:  80 degrees  External rotation 45ø:  30 degrees   Internal rotation 45ø: 55 degrees        See Exercise, Manual, and Modality Logs for complete treatment.       Assessment/Plan    Pt tolerated therapy session moderately well - with progression of therapeutic exercises, Functional activities, and Manual therapy. He has improved, but continues to have deficits in His Right Shoulder-Scapular ROM,  Strength, and Stability; limiting function and ability to perform ADLs without pain at this time.  Pt continues to be limited by pain and muscle guarding; but this has improved as compared to initial therapy session.  Re-iterated to pt the importance of using sling when moving around in his home and outdoors secondary to unanticipated incidents that he could encounter.    Progress per Plan of Care- as tolerated - as per protocol       "     Timed:  Manual Therapy:    15     mins  30241;  Therapeutic Exercise:    15     mins  79740;     Neuromuscular Flower:    0    mins  75186;    Therapeutic Activity:     8     mins  44400;     Gait Trainin     mins  17116;     Ultrasound:     0     mins  81799;    Electrical Stimulation:    0     mins  93268;  Iontophoresis     0     mins  62299    Untimed:  Electrical Stimulation:    0     mins  13229 ( );  Mechanical Traction:    0     mins  77203;       Timed Treatment:   38   mins   Total Treatment:     48   mins        Ashlee Orantes PTA  Physical Therapist Assistant

## 2023-08-26 ENCOUNTER — HOSPITAL ENCOUNTER (EMERGENCY)
Facility: HOSPITAL | Age: 58
Discharge: HOME | End: 2023-08-26
Attending: EMERGENCY MEDICINE
Payer: OTHER GOVERNMENT

## 2023-08-26 ENCOUNTER — APPOINTMENT (OUTPATIENT)
Dept: GENERAL RADIOLOGY | Facility: HOSPITAL | Age: 58
End: 2023-08-26
Payer: OTHER GOVERNMENT

## 2023-08-26 ENCOUNTER — APPOINTMENT (OUTPATIENT)
Dept: CT IMAGING | Facility: HOSPITAL | Age: 58
End: 2023-08-26
Payer: OTHER GOVERNMENT

## 2023-08-26 VITALS
WEIGHT: 315 LBS | SYSTOLIC BLOOD PRESSURE: 132 MMHG | DIASTOLIC BLOOD PRESSURE: 89 MMHG | BODY MASS INDEX: 42.66 KG/M2 | HEIGHT: 72 IN | TEMPERATURE: 97.3 F | OXYGEN SATURATION: 95 % | HEART RATE: 74 BPM | RESPIRATION RATE: 18 BRPM

## 2023-08-26 DIAGNOSIS — R07.81 PLEURITIC PAIN: ICD-10-CM

## 2023-08-26 DIAGNOSIS — J18.9 PNEUMONIA OF LEFT LOWER LOBE DUE TO INFECTIOUS ORGANISM: Primary | ICD-10-CM

## 2023-08-26 LAB
ALBUMIN SERPL-MCNC: 3.7 G/DL (ref 3.5–5.2)
ALBUMIN/GLOB SERPL: 1.4 G/DL
ALP SERPL-CCNC: 104 U/L (ref 39–117)
ALT SERPL W P-5'-P-CCNC: 28 U/L (ref 1–41)
ANION GAP SERPL CALCULATED.3IONS-SCNC: 9.9 MMOL/L (ref 5–15)
AST SERPL-CCNC: 18 U/L (ref 1–40)
BASOPHILS # BLD AUTO: 0.04 10*3/MM3 (ref 0–0.2)
BASOPHILS NFR BLD AUTO: 0.5 % (ref 0–1.5)
BILIRUB SERPL-MCNC: 0.6 MG/DL (ref 0–1.2)
BILIRUB UR QL STRIP: NEGATIVE
BUN SERPL-MCNC: 14 MG/DL (ref 6–20)
BUN/CREAT SERPL: 13.3 (ref 7–25)
CALCIUM SPEC-SCNC: 8.7 MG/DL (ref 8.6–10.5)
CHLORIDE SERPL-SCNC: 101 MMOL/L (ref 98–107)
CLARITY UR: CLEAR
CO2 SERPL-SCNC: 24.1 MMOL/L (ref 22–29)
COLOR UR: ABNORMAL
CREAT SERPL-MCNC: 1.05 MG/DL (ref 0.76–1.27)
D-LACTATE SERPL-SCNC: 1.9 MMOL/L (ref 0.5–2)
DEPRECATED RDW RBC AUTO: 40.2 FL (ref 37–54)
EGFRCR SERPLBLD CKD-EPI 2021: 82.3 ML/MIN/1.73
EOSINOPHIL # BLD AUTO: 0.18 10*3/MM3 (ref 0–0.4)
EOSINOPHIL NFR BLD AUTO: 2.3 % (ref 0.3–6.2)
ERYTHROCYTE [DISTWIDTH] IN BLOOD BY AUTOMATED COUNT: 12.7 % (ref 12.3–15.4)
GLOBULIN UR ELPH-MCNC: 2.6 GM/DL
GLUCOSE SERPL-MCNC: 137 MG/DL (ref 65–99)
GLUCOSE UR STRIP-MCNC: ABNORMAL MG/DL
HCT VFR BLD AUTO: 43.3 % (ref 37.5–51)
HGB BLD-MCNC: 14.3 G/DL (ref 13–17.7)
HGB UR QL STRIP.AUTO: NEGATIVE
HOLD SPECIMEN: NORMAL
HOLD SPECIMEN: NORMAL
IMM GRANULOCYTES # BLD AUTO: 0.03 10*3/MM3 (ref 0–0.05)
IMM GRANULOCYTES NFR BLD AUTO: 0.4 % (ref 0–0.5)
KETONES UR QL STRIP: ABNORMAL
LEUKOCYTE ESTERASE UR QL STRIP.AUTO: NEGATIVE
LIPASE SERPL-CCNC: 31 U/L (ref 13–60)
LYMPHOCYTES # BLD AUTO: 0.69 10*3/MM3 (ref 0.7–3.1)
LYMPHOCYTES NFR BLD AUTO: 8.8 % (ref 19.6–45.3)
MCH RBC QN AUTO: 28.7 PG (ref 26.6–33)
MCHC RBC AUTO-ENTMCNC: 33 G/DL (ref 31.5–35.7)
MCV RBC AUTO: 86.9 FL (ref 79–97)
MONOCYTES # BLD AUTO: 0.85 10*3/MM3 (ref 0.1–0.9)
MONOCYTES NFR BLD AUTO: 10.9 % (ref 5–12)
NEUTROPHILS NFR BLD AUTO: 6.01 10*3/MM3 (ref 1.7–7)
NEUTROPHILS NFR BLD AUTO: 77.1 % (ref 42.7–76)
NITRITE UR QL STRIP: NEGATIVE
NRBC BLD AUTO-RTO: 0 /100 WBC (ref 0–0.2)
PH UR STRIP.AUTO: 5.5 [PH] (ref 5–8)
PLATELET # BLD AUTO: 142 10*3/MM3 (ref 140–450)
PMV BLD AUTO: 9.5 FL (ref 6–12)
POTASSIUM SERPL-SCNC: 3.6 MMOL/L (ref 3.5–5.2)
PROT SERPL-MCNC: 6.3 G/DL (ref 6–8.5)
PROT UR QL STRIP: ABNORMAL
RBC # BLD AUTO: 4.98 10*6/MM3 (ref 4.14–5.8)
SODIUM SERPL-SCNC: 135 MMOL/L (ref 136–145)
SP GR UR STRIP: >=1.03 (ref 1–1.03)
UROBILINOGEN UR QL STRIP: ABNORMAL
WBC NRBC COR # BLD: 7.8 10*3/MM3 (ref 3.4–10.8)
WHOLE BLOOD HOLD COAG: NORMAL
WHOLE BLOOD HOLD SPECIMEN: NORMAL

## 2023-08-26 PROCEDURE — 71045 X-RAY EXAM CHEST 1 VIEW: CPT

## 2023-08-26 PROCEDURE — 83690 ASSAY OF LIPASE: CPT | Performed by: EMERGENCY MEDICINE

## 2023-08-26 PROCEDURE — 25010000002 KETOROLAC TROMETHAMINE PER 15 MG: Performed by: EMERGENCY MEDICINE

## 2023-08-26 PROCEDURE — 85025 COMPLETE CBC W/AUTO DIFF WBC: CPT | Performed by: EMERGENCY MEDICINE

## 2023-08-26 PROCEDURE — 80053 COMPREHEN METABOLIC PANEL: CPT | Performed by: EMERGENCY MEDICINE

## 2023-08-26 PROCEDURE — 81003 URINALYSIS AUTO W/O SCOPE: CPT | Performed by: EMERGENCY MEDICINE

## 2023-08-26 PROCEDURE — 99285 EMERGENCY DEPT VISIT HI MDM: CPT

## 2023-08-26 PROCEDURE — 83605 ASSAY OF LACTIC ACID: CPT | Performed by: EMERGENCY MEDICINE

## 2023-08-26 PROCEDURE — 25510000001 IOPAMIDOL PER 1 ML: Performed by: EMERGENCY MEDICINE

## 2023-08-26 PROCEDURE — 96375 TX/PRO/DX INJ NEW DRUG ADDON: CPT

## 2023-08-26 PROCEDURE — 74177 CT ABD & PELVIS W/CONTRAST: CPT

## 2023-08-26 PROCEDURE — 25010000002 ONDANSETRON PER 1 MG: Performed by: EMERGENCY MEDICINE

## 2023-08-26 PROCEDURE — 96376 TX/PRO/DX INJ SAME DRUG ADON: CPT

## 2023-08-26 PROCEDURE — 25010000002 HYDROMORPHONE 1 MG/ML SOLUTION: Performed by: EMERGENCY MEDICINE

## 2023-08-26 PROCEDURE — 96374 THER/PROPH/DIAG INJ IV PUSH: CPT

## 2023-08-26 RX ORDER — HYDROCODONE BITARTRATE AND ACETAMINOPHEN 7.5; 325 MG/1; MG/1
1 TABLET ORAL EVERY 6 HOURS PRN
Qty: 12 TABLET | Refills: 0 | Status: SHIPPED | OUTPATIENT
Start: 2023-08-26 | End: 2023-08-29

## 2023-08-26 RX ORDER — KETOROLAC TROMETHAMINE 30 MG/ML
30 INJECTION, SOLUTION INTRAMUSCULAR; INTRAVENOUS ONCE
Status: COMPLETED | OUTPATIENT
Start: 2023-08-26 | End: 2023-08-26

## 2023-08-26 RX ORDER — ONDANSETRON 2 MG/ML
4 INJECTION INTRAMUSCULAR; INTRAVENOUS ONCE
Status: COMPLETED | OUTPATIENT
Start: 2023-08-26 | End: 2023-08-26

## 2023-08-26 RX ORDER — HYDROCODONE BITARTRATE AND ACETAMINOPHEN 10; 325 MG/1; MG/1
1 TABLET ORAL EVERY 6 HOURS PRN
Qty: 12 TABLET | Refills: 0 | Status: SHIPPED | OUTPATIENT
Start: 2023-08-26 | End: 2023-08-28 | Stop reason: SDUPTHER

## 2023-08-26 RX ORDER — SODIUM CHLORIDE 0.9 % (FLUSH) 0.9 %
10 SYRINGE (ML) INJECTION AS NEEDED
Status: DISCONTINUED | OUTPATIENT
Start: 2023-08-26 | End: 2023-08-26 | Stop reason: HOSPADM

## 2023-08-26 RX ORDER — AZITHROMYCIN 250 MG/1
TABLET, FILM COATED ORAL
Qty: 6 TABLET | Refills: 0 | Status: SHIPPED | OUTPATIENT
Start: 2023-08-26 | End: 2023-09-01 | Stop reason: HOSPADM

## 2023-08-26 RX ADMIN — IOPAMIDOL 100 ML: 755 INJECTION, SOLUTION INTRAVENOUS at 12:28

## 2023-08-26 RX ADMIN — HYDROMORPHONE HYDROCHLORIDE 1 MG: 1 INJECTION, SOLUTION INTRAMUSCULAR; INTRAVENOUS; SUBCUTANEOUS at 11:45

## 2023-08-26 RX ADMIN — KETOROLAC TROMETHAMINE 30 MG: 30 INJECTION, SOLUTION INTRAMUSCULAR; INTRAVENOUS at 18:04

## 2023-08-26 RX ADMIN — ONDANSETRON 4 MG: 2 INJECTION INTRAMUSCULAR; INTRAVENOUS at 11:47

## 2023-08-26 RX ADMIN — KETOROLAC TROMETHAMINE 30 MG: 30 INJECTION, SOLUTION INTRAMUSCULAR; INTRAVENOUS at 11:49

## 2023-08-26 RX ADMIN — HYDROMORPHONE HYDROCHLORIDE 0.5 MG: 1 INJECTION, SOLUTION INTRAMUSCULAR; INTRAVENOUS; SUBCUTANEOUS at 15:44

## 2023-08-26 NOTE — ED PROVIDER NOTES
Time: 11:32 AM EDT  Date of encounter:  8/26/2023  Independent Historian/Clinical History and Information was obtained by:   Patient    History is limited by: N/A    Chief Complaint: Flank pain      History of Present Illness:  Patient is a 58 y.o. year old male who presents to the emergency department for evaluation of left flank pain.  Patient reports having pain that started last night that radiates from the left CVA area around to the left lower abdomen.  Pain is sharp and it intense at times but waxes and wanes.  He has also had nausea with it.  Patient has not had any issues urinating.    HPI    Patient Care Team  Primary Care Provider: Maite Montoya DO    Past Medical History:     No Known Allergies  Past Medical History:   Diagnosis Date    Acid reflux     Back pain     Cancer     skin    Hyperlipidemia     Hypertension     Insomnia     PTSD (post-traumatic stress disorder)     no isssues when waking from anesthesia     Past Surgical History:   Procedure Laterality Date    BACK SURGERY      x2    COLONOSCOPY N/A 01/03/2023    Procedure: COLONOSCOPY WITH POLYPECTOMY;  Surgeon: Jose Hopson MD;  Location: [facility] ENDOSCOPY;  Service: General;  Laterality: N/A;  COLON POLYP, SUBOPTIMAL PREP    ENDOSCOPY N/A 01/03/2023    Procedure: ESOPHAGOGASTRODUODENOSCOPY WITH BIOPSIES, POLYPECTOMIES;  Surgeon: Jose Hopson MD;  Location: [facility] ENDOSCOPY;  Service: General;  Laterality: N/A;  HIATAL HERNIA, GASTRIC POLYPS    KNEE SURGERY Right     OTHER SURGICAL HISTORY      CANCER REMOVAL FACE    SHOULDER ARTHROSCOPY Right     SHOULDER ARTHROSCOPY W/ ROTATOR CUFF REPAIR Right 8/17/2023    Procedure: SHOULDER ARTHROSCOPY WITH SUBCROMIAL DECOMPRESSION AND DISTAL CLAVICULECTOMY, OPEN BICEPS TENODESIS;  Surgeon: Marek Gaviria MD;  [place] [facility] [place] [facility] [place]: [place]opedics;  Laterality: Right;     Family History   Problem Relation Age of Onset    Malig Hyperthermia Neg Hx        Home Medications:  Prior to  "Admission medications    Medication Sig Start Date End Date Taking? Authorizing Provider   amLODIPine (NORVASC) 5 MG tablet Has not been taking for over a year    Emergency, Nurse Epic, RN   cyclobenzaprine (FLEXERIL) 10 MG tablet cyclobenzaprine 10 mg oral tablet take 1 tablet by oral route As needed   Active    Emergency, Nurse Jennifer RN   HYDROcodone-acetaminophen (Norco) 7.5-325 MG per tablet Take 1 tablet by mouth Every 4 (Four) Hours As Needed for Moderate Pain. 8/17/23   Marek Gaviria MD   pantoprazole (Protonix) 40 MG EC tablet Take 1 tablet by mouth Daily.  Patient taking differently: Take 1 tablet by mouth 2 (Two) Times a Day. 12/29/22 12/29/23  Jose Hopson MD   traZODone (DESYREL) 100 MG tablet Take 3 tablets by mouth Every Night.    Emergency, Nurse AMY Rodriguez   UNKNOWN TO PATIENT Patient unaware of name of medication for Cholesterol, does not take medication    ProviderAnila MD        Social History:   Social History     Tobacco Use    Smoking status: Never    Smokeless tobacco: Current     Types: Snuff   Vaping Use    Vaping Use: Never used   Substance Use Topics    Alcohol use: Yes     Comment: RARELY    Drug use: Not Currently         Review of Systems:  Review of Systems   Gastrointestinal:  Positive for abdominal pain.   Genitourinary:  Positive for flank pain.      Physical Exam:  /74 (BP Location: Left arm, Patient Position: Lying)   Pulse 83   Temp 97.3 °F (36.3 °C) (Oral)   Resp 16   Ht 182.9 cm (72\")   Wt (!) 210 kg (462 lb 15.5 oz)   SpO2 96%   BMI 62.79 kg/m²     Physical Exam  Vitals and nursing note reviewed.   Constitutional:       General: He is in acute distress.      Appearance: He is well-developed.   HENT:      Head: Normocephalic.   Cardiovascular:      Rate and Rhythm: Normal rate and regular rhythm.   Pulmonary:      Effort: Pulmonary effort is normal. No respiratory distress.      Breath sounds: Normal breath sounds.   Abdominal:      General: Abdomen " is flat.      Palpations: Abdomen is soft.      Tenderness: There is abdominal tenderness. There is left CVA tenderness.      Hernia: No hernia is present.   Musculoskeletal:         General: Normal range of motion.      Cervical back: Normal range of motion and neck supple.   Skin:     General: Skin is warm and dry.      Capillary Refill: Capillary refill takes less than 2 seconds.   Neurological:      Mental Status: He is alert and oriented to person, place, and time. Mental status is at baseline.                Procedures:  Procedures      Medical Decision Making:      Comorbidities that affect care:    Hypertension, cancer    External Notes reviewed:    Previous Operation Note: 8/17/2023 op note by Dr. Gaviria, right shoulder surgery for impingement      The following orders were placed and all results were independently analyzed by me:  Orders Placed This Encounter   Procedures    [place] Draw    Comprehensive Metabolic Panel    Lipase    Urinalysis With Microscopic If Indicated (No Culture) - Urine, Clean Catch    Lactic Acid, Plasma    CBC Auto Differential    NPO Diet NPO Type: Strict NPO    Undress & Gown    Insert Peripheral IV    CBC & Differential    Green Top (Gel)    Lavender Top    Gold Top - SST    Light Blue Top       Medications Given in the Emergency Department:  Medications   sodium chloride 0.9 % flush 10 mL (has no administration in time range)        ED Course:         Labs:    Lab Results (last 24 hours)       ** No results found for the last 24 hours. **             Imaging:    No Radiology Exams Resulted Within Past 24 Hours      Differential Diagnosis and Discussion:    Flank Pain: Differential diagnosis includes but is not limited to kidney stones, pyelonephritis, musculoskeletal disorders, renal infarction, urinary tract infection, hydronephrosis, radiculopathy, aortic aneurysm, renal cell carcinoma.    All labs were reviewed and interpreted by me.  CT scan radiology impression was  interpreted by me.    MDM         Patient Care Considerations:    {Considerations (Optional):17786}      Consultants/Shared Management Plan:    {Shared Management Plan (Optional):42287}    Social Determinants of Health:    Patient is independent, reliable, and has access to care.       Disposition and Care Coordination:    {Admission consideration:76055}    {Discharge (Optional):26232}    Final diagnoses:   None        ED Disposition       None            This medical record created using voice recognition software.           musculoskeletal disorders, renal infarction, urinary tract infection, hydronephrosis, radiculopathy, aortic aneurysm, renal cell carcinoma.    All labs were reviewed and interpreted by me.  CT scan radiology impression was interpreted by me.    MDM  Patient has stable vitals.  Possible pneumonia seen on chest x-ray and CT scan of the abdomen pelvis.  Patient has no respiratory distress.  He is considered stable for discharge on antibiotics with close outpatient follow-up.  Patient agrees with this plan.        Patient Care Considerations:          Consultants/Shared Management Plan:    None    Social Determinants of Health:    Patient is independent, reliable, and has access to care.       Disposition and Care Coordination:    Discharged: I considered escalation of care by admitting this patient for observation, however the patient has improved and is suitable and  stable for discharge.    I have explained discharge medications and the need for follow up with the patient/caretakers. This was also printed in the discharge instructions. Patient was discharged with the following medications and follow up:      Medication List        New Prescriptions      azithromycin 250 MG tablet  Commonly known as: Zithromax Z-Al  Take 2 tablets by mouth on day 1, then 1 tablet daily on days 2-5            Changed      pantoprazole 40 MG EC tablet  Commonly known as: Protonix  Take 1 tablet by mouth Daily.  What changed:   how much to take  when to take this            Stop      HYDROcodone-acetaminophen 7.5-325 MG per tablet  Commonly known as: Norco               Where to Get Your Medications        These medications were sent to [facility]/pharmacy #[id number] - [place], [place] - [address] - [phone]  - [phone] FX  [address] [place] [address]      Hours: 24-hours Phone: [phone]   azithromycin 250 MG tablet      Maite Montoya DO  [address] [place] [address] [phone]    Schedule an  appointment as soon as possible for a visit         Final diagnoses:   Pneumonia of left lower lobe due to infectious organism   Pleuritic pain        ED Disposition       ED Disposition   Discharge    Condition   Stable    Comment   --               This medical record created using voice recognition software.             Aldo Candelario DO  08/31/23 [id number]

## 2023-08-28 ENCOUNTER — OFFICE VISIT (OUTPATIENT)
Dept: ORTHOPEDIC SURGERY | Facility: CLINIC | Age: 58
End: 2023-08-28
Payer: OTHER GOVERNMENT

## 2023-08-28 ENCOUNTER — TREATMENT (OUTPATIENT)
Dept: PHYSICAL THERAPY | Facility: CLINIC | Age: 58
End: 2023-08-28
Payer: OTHER GOVERNMENT

## 2023-08-28 VITALS — HEIGHT: 72 IN | BODY MASS INDEX: 27.09 KG/M2 | WEIGHT: 200 LBS

## 2023-08-28 DIAGNOSIS — Z98.890 S/P ARTHROSCOPY OF RIGHT SHOULDER: Primary | ICD-10-CM

## 2023-08-28 DIAGNOSIS — R07.81 PLEURITIC PAIN: ICD-10-CM

## 2023-08-28 DIAGNOSIS — Z47.89 AFTERCARE FOLLOWING SURGERY OF THE MUSCULOSKELETAL SYSTEM: Primary | ICD-10-CM

## 2023-08-28 DIAGNOSIS — M25.511 ACUTE PAIN OF RIGHT SHOULDER: ICD-10-CM

## 2023-08-28 DIAGNOSIS — R29.898 SHOULDER WEAKNESS: ICD-10-CM

## 2023-08-28 PROCEDURE — 99024 POSTOP FOLLOW-UP VISIT: CPT | Performed by: PHYSICIAN ASSISTANT

## 2023-08-28 RX ORDER — HYDROCODONE BITARTRATE AND ACETAMINOPHEN 10; 325 MG/1; MG/1
1 TABLET ORAL EVERY 6 HOURS PRN
Qty: 28 TABLET | Refills: 0 | Status: SHIPPED | OUTPATIENT
Start: 2023-08-28

## 2023-08-28 NOTE — PROGRESS NOTES
Physical Therapy Daily Treatment Note      Patient: Pavan Perales   : 1965  Referring practitioner: Marek Gaviria MD  Date of Initial Visit: Type: THERAPY  Noted: 2023  Today's Date: 2023  Patient seen for 3 sessions           Subjective   Pavan Perales reports: just took pain medication and pain is controlled; patient reports diagnosed with pneumonia on 23.       Objective   See Exercise, Manual, and Modality Logs for complete treatment.       Assessment & Plan       Assessment  Assessment details: Patient able to tolerate PROM better today secondary to pain medication.      Visit Diagnoses:    ICD-10-CM ICD-9-CM   1. S/P arthroscopy of right shoulder  Z98.890 V45.89   2. Acute pain of right shoulder  M25.511 719.41   3. Shoulder weakness  R29.898 719.61       Progress per Plan of Care           Timed:  Manual Therapy:    15     mins  19126;  Therapeutic Exercise:    16     mins  86745;     Neuromuscular Flower:        mins  69692;    Therapeutic Activity:          mins  32866;     Gait Training:           mins  13278;     Ultrasound:          mins  15843;    Electrical Stimulation:         mins  80253 ( );    Untimed:  Electrical Stimulation:         mins  66900 ( );  Mechanical Traction:         mins  23156;     Timed Treatment:   31   mins   Total Treatment:     31   mins  Jenifer Girard PT    Electronically singed 2023      [place] PT license: [id number]  Physical Therapist

## 2023-08-28 NOTE — PATIENT INSTRUCTIONS
Sutures/staples removed in office today. Steri-strips applied. Patient educated on incision care. May shower, but do not submerge in water until fully healed. Do not apply creams or lotions. Allow steri-strips to fall off on their own in 7-10 days.     Continue sling for the next 4 weeks. May remove for hygiene.     Advised on pendulums, gentle range of motion exercises to the elbow, wrist, and hand/fingers. No active range of motion of the shoulder. No lifting, pushing, or pulling.     Continue PT as per protocol.    Follow-up in 4 weeks. No x-rays needed. Call with any changes or concerns.

## 2023-08-28 NOTE — PROGRESS NOTES
"Chief Complaint  Pain and Follow-up of the Right Shoulder    Subjective      Pavan Perales presents to [facility] for follow-up of right shoulder arthroscopy with subacromial decompression, distal claviculectomy, and biceps tenodesis performed on 8/17/2023 by Dr. Gaviria.  Patient presents today with postop sling and dressing in place.  Dates he has started outpatient physical therapy through [facility] and is progressing well with this.  Pain is well controlled with use of Norco 10.  Does report that he was recently diagnosed with pneumonia and is currently on antibiotics for this.    Objective   No Known Allergies    Vital Signs:   Ht 182.9 cm (72\")   Wt 90.7 kg (200 lb)   BMI 27.12 kg/mý       Physical Exam    Constitutional: Awake, alert. Well nourished appearance.    Integumentary: Warm, dry, intact. No obvious rashes.    HENT: Atraumatic, normocephalic.   Respiratory: Non labored respirations .   Cardiovascular: Intact peripheral pulses.    Psychiatric: Normal mood and affect. A&O X3    Ortho Exam  Right shoulder: Well-healing surgical incisions visualized.  Patient demonstrates shoulder pendulums.  Full flexion and extension of the elbow.  Full pronation and supination.  Patient is able to form a full fist.  Sensation intact light touch.  Distal neurovascular intact.    Imaging Results (Most Recent)       None                    Assessment and Plan   Problem List Items Addressed This Visit    None  Visit Diagnoses       S/p R shoulder arthroscopy with SAD, DC, open biceps tenodesis    -  Primary          Follow Up   Return in about 4 weeks (around 9/25/2023).    Tobacco Use: High Risk    Smoking Tobacco Use: Never    Smokeless Tobacco Use: Current    Passive Exposure: Not on file     Patient is a non-smoker.  Did not discuss tobacco cessation options.    Patient Instructions   Sutures/staples removed in office today. Steri-strips applied. Patient educated on " incision care. May shower, but do not submerge in water until fully healed. Do not apply creams or lotions. Allow steri-strips to fall off on their own in 7-10 days.     Continue sling for the next 4 weeks. May remove for hygiene.     Advised on pendulums, gentle range of motion exercises to the elbow, wrist, and hand/fingers. No active range of motion of the shoulder. No lifting, pushing, or pulling.     Continue PT as per protocol.    Follow-up in 4 weeks. No x-rays needed. Call with any changes or concerns.     Patient was given instructions and counseling regarding his condition or for health maintenance advice. Please see specific information pulled into the AVS if appropriate.

## 2023-08-29 ENCOUNTER — APPOINTMENT (OUTPATIENT)
Dept: GENERAL RADIOLOGY | Facility: HOSPITAL | Age: 58
DRG: 176 | End: 2023-08-29
Payer: OTHER GOVERNMENT

## 2023-08-29 ENCOUNTER — APPOINTMENT (OUTPATIENT)
Dept: CT IMAGING | Facility: HOSPITAL | Age: 58
DRG: 176 | End: 2023-08-29
Payer: OTHER GOVERNMENT

## 2023-08-29 ENCOUNTER — HOSPITAL ENCOUNTER (INPATIENT)
Facility: HOSPITAL | Age: 58
LOS: 3 days | Discharge: HOME | DRG: 176 | End: 2023-09-01
Attending: EMERGENCY MEDICINE | Admitting: FAMILY MEDICINE
Payer: OTHER GOVERNMENT

## 2023-08-29 DIAGNOSIS — I26.99 ACUTE PULMONARY EMBOLISM, UNSPECIFIED PULMONARY EMBOLISM TYPE, UNSPECIFIED WHETHER ACUTE COR PULMONALE PRESENT: Primary | ICD-10-CM

## 2023-08-29 DIAGNOSIS — J18.9 PNEUMONIA DUE TO INFECTIOUS ORGANISM, UNSPECIFIED LATERALITY, UNSPECIFIED PART OF LUNG: ICD-10-CM

## 2023-08-29 DIAGNOSIS — Z78.9 DECREASED ACTIVITIES OF DAILY LIVING (ADL): ICD-10-CM

## 2023-08-29 DIAGNOSIS — R26.2 DIFFICULTY WALKING: ICD-10-CM

## 2023-08-29 LAB
ALBUMIN SERPL-MCNC: 3.2 G/DL (ref 3.5–5.2)
ALBUMIN/GLOB SERPL: 1.1 G/DL
ALP SERPL-CCNC: 142 U/L (ref 39–117)
ALT SERPL W P-5'-P-CCNC: 21 U/L (ref 1–41)
ANION GAP SERPL CALCULATED.3IONS-SCNC: 11.9 MMOL/L (ref 5–15)
APTT PPP: >200 SECONDS (ref 24.2–34.2)
AST SERPL-CCNC: 21 U/L (ref 1–40)
BASOPHILS # BLD AUTO: 0.03 10*3/MM3 (ref 0–0.2)
BASOPHILS NFR BLD AUTO: 0.4 % (ref 0–1.5)
BILIRUB SERPL-MCNC: 0.3 MG/DL (ref 0–1.2)
BILIRUB UR QL STRIP: NEGATIVE
BUN SERPL-MCNC: 9 MG/DL (ref 6–20)
BUN/CREAT SERPL: 7.8 (ref 7–25)
CALCIUM SPEC-SCNC: 8.7 MG/DL (ref 8.6–10.5)
CHLORIDE SERPL-SCNC: 97 MMOL/L (ref 98–107)
CLARITY UR: CLEAR
CO2 SERPL-SCNC: 25.1 MMOL/L (ref 22–29)
COLOR UR: YELLOW
CREAT SERPL-MCNC: 1.16 MG/DL (ref 0.76–1.27)
DEPRECATED RDW RBC AUTO: 40.9 FL (ref 37–54)
EGFRCR SERPLBLD CKD-EPI 2021: 73 ML/MIN/1.73
EOSINOPHIL # BLD AUTO: 0.24 10*3/MM3 (ref 0–0.4)
EOSINOPHIL NFR BLD AUTO: 2.8 % (ref 0.3–6.2)
ERYTHROCYTE [DISTWIDTH] IN BLOOD BY AUTOMATED COUNT: 12.8 % (ref 12.3–15.4)
FLUAV AG NPH QL: NEGATIVE
FLUBV AG NPH QL IA: NEGATIVE
GLOBULIN UR ELPH-MCNC: 3 GM/DL
GLUCOSE SERPL-MCNC: 136 MG/DL (ref 65–99)
GLUCOSE UR STRIP-MCNC: NEGATIVE MG/DL
HCT VFR BLD AUTO: 36 % (ref 37.5–51)
HGB BLD-MCNC: 12.3 G/DL (ref 13–17.7)
HGB UR QL STRIP.AUTO: NEGATIVE
HOLD SPECIMEN: NORMAL
HOLD SPECIMEN: NORMAL
IMM GRANULOCYTES # BLD AUTO: 0.05 10*3/MM3 (ref 0–0.05)
IMM GRANULOCYTES NFR BLD AUTO: 0.6 % (ref 0–0.5)
INR PPP: 1.3 (ref 0.86–1.15)
KETONES UR QL STRIP: NEGATIVE
LEUKOCYTE ESTERASE UR QL STRIP.AUTO: NEGATIVE
LYMPHOCYTES # BLD AUTO: 0.7 10*3/MM3 (ref 0.7–3.1)
LYMPHOCYTES NFR BLD AUTO: 8.3 % (ref 19.6–45.3)
MCH RBC QN AUTO: 29.6 PG (ref 26.6–33)
MCHC RBC AUTO-ENTMCNC: 34.2 G/DL (ref 31.5–35.7)
MCV RBC AUTO: 86.7 FL (ref 79–97)
MONOCYTES # BLD AUTO: 0.77 10*3/MM3 (ref 0.1–0.9)
MONOCYTES NFR BLD AUTO: 9.1 % (ref 5–12)
NEUTROPHILS NFR BLD AUTO: 6.66 10*3/MM3 (ref 1.7–7)
NEUTROPHILS NFR BLD AUTO: 78.8 % (ref 42.7–76)
NITRITE UR QL STRIP: NEGATIVE
NRBC BLD AUTO-RTO: 0 /100 WBC (ref 0–0.2)
NT-PROBNP SERPL-MCNC: 73.5 PG/ML (ref 0–900)
PH UR STRIP.AUTO: 6 [PH] (ref 5–8)
PLATELET # BLD AUTO: 164 10*3/MM3 (ref 140–450)
PMV BLD AUTO: 11.2 FL (ref 6–12)
POTASSIUM SERPL-SCNC: 4.3 MMOL/L (ref 3.5–5.2)
PROCALCITONIN SERPL-MCNC: 0.23 NG/ML (ref 0–0.25)
PROT SERPL-MCNC: 6.2 G/DL (ref 6–8.5)
PROT UR QL STRIP: ABNORMAL
PROTHROMBIN TIME: 16.2 SECONDS (ref 11.8–14.9)
QT INTERVAL: 394 MS
QTC INTERVAL: 446 MS
RBC # BLD AUTO: 4.15 10*6/MM3 (ref 4.14–5.8)
SARS-COV-2 RNA RESP QL NAA+PROBE: NOT DETECTED
SODIUM SERPL-SCNC: 134 MMOL/L (ref 136–145)
SP GR UR STRIP: 1.02 (ref 1–1.03)
TROPONIN T SERPL HS-MCNC: <6 NG/L
UROBILINOGEN UR QL STRIP: ABNORMAL
WBC NRBC COR # BLD: 8.45 10*3/MM3 (ref 3.4–10.8)
WHOLE BLOOD HOLD COAG: NORMAL
WHOLE BLOOD HOLD SPECIMEN: NORMAL

## 2023-08-29 PROCEDURE — 85610 PROTHROMBIN TIME: CPT

## 2023-08-29 PROCEDURE — 85730 THROMBOPLASTIN TIME PARTIAL: CPT

## 2023-08-29 PROCEDURE — 25010000002 DIPHENHYDRAMINE PER 50 MG: Performed by: EMERGENCY MEDICINE

## 2023-08-29 PROCEDURE — 85730 THROMBOPLASTIN TIME PARTIAL: CPT | Performed by: FAMILY MEDICINE

## 2023-08-29 PROCEDURE — 84484 ASSAY OF TROPONIN QUANT: CPT

## 2023-08-29 PROCEDURE — 36415 COLL VENOUS BLD VENIPUNCTURE: CPT | Performed by: EMERGENCY MEDICINE

## 2023-08-29 PROCEDURE — 81003 URINALYSIS AUTO W/O SCOPE: CPT | Performed by: EMERGENCY MEDICINE

## 2023-08-29 PROCEDURE — 87804 INFLUENZA ASSAY W/OPTIC: CPT | Performed by: EMERGENCY MEDICINE

## 2023-08-29 PROCEDURE — 71260 CT THORAX DX C+: CPT

## 2023-08-29 PROCEDURE — 25510000001 IOPAMIDOL PER 1 ML: Performed by: EMERGENCY MEDICINE

## 2023-08-29 PROCEDURE — 83880 ASSAY OF NATRIURETIC PEPTIDE: CPT

## 2023-08-29 PROCEDURE — 99285 EMERGENCY DEPT VISIT HI MDM: CPT

## 2023-08-29 PROCEDURE — 87635 SARS-COV-2 COVID-19 AMP PRB: CPT

## 2023-08-29 PROCEDURE — 87040 BLOOD CULTURE FOR BACTERIA: CPT | Performed by: FAMILY MEDICINE

## 2023-08-29 PROCEDURE — 93005 ELECTROCARDIOGRAM TRACING: CPT

## 2023-08-29 PROCEDURE — 80053 COMPREHEN METABOLIC PANEL: CPT

## 2023-08-29 PROCEDURE — 25010000002 HEPARIN (PORCINE) 25000-0.45 UT/250ML-% SOLUTION

## 2023-08-29 PROCEDURE — 25010000002 MORPHINE PER 10 MG: Performed by: EMERGENCY MEDICINE

## 2023-08-29 PROCEDURE — 71045 X-RAY EXAM CHEST 1 VIEW: CPT

## 2023-08-29 PROCEDURE — 85025 COMPLETE CBC W/AUTO DIFF WBC: CPT

## 2023-08-29 PROCEDURE — 84145 PROCALCITONIN (PCT): CPT

## 2023-08-29 PROCEDURE — 74018 RADEX ABDOMEN 1 VIEW: CPT

## 2023-08-29 RX ORDER — CEFTRIAXONE SODIUM 1 G/50ML
1000 INJECTION, SOLUTION INTRAVENOUS EVERY 24 HOURS
Status: DISCONTINUED | OUTPATIENT
Start: 2023-08-29 | End: 2023-08-30

## 2023-08-29 RX ORDER — SODIUM CHLORIDE 0.9 % (FLUSH) 0.9 %
10 SYRINGE (ML) INJECTION AS NEEDED
Status: DISCONTINUED | OUTPATIENT
Start: 2023-08-29 | End: 2023-09-01 | Stop reason: HOSPADM

## 2023-08-29 RX ORDER — BISACODYL 10 MG
10 SUPPOSITORY, RECTAL RECTAL DAILY PRN
Status: DISCONTINUED | OUTPATIENT
Start: 2023-08-29 | End: 2023-09-01 | Stop reason: HOSPADM

## 2023-08-29 RX ORDER — SODIUM CHLORIDE 9 MG/ML
40 INJECTION, SOLUTION INTRAVENOUS AS NEEDED
Status: DISCONTINUED | OUTPATIENT
Start: 2023-08-29 | End: 2023-09-01 | Stop reason: HOSPADM

## 2023-08-29 RX ORDER — HYDROCODONE BITARTRATE AND ACETAMINOPHEN 5; 325 MG/1; MG/1
1 TABLET ORAL EVERY 4 HOURS PRN
Status: DISCONTINUED | OUTPATIENT
Start: 2023-08-29 | End: 2023-09-01 | Stop reason: HOSPADM

## 2023-08-29 RX ORDER — POLYETHYLENE GLYCOL 3350 17 G/17G
17 POWDER, FOR SOLUTION ORAL DAILY PRN
Status: DISCONTINUED | OUTPATIENT
Start: 2023-08-29 | End: 2023-09-01 | Stop reason: HOSPADM

## 2023-08-29 RX ORDER — AMOXICILLIN 250 MG
2 CAPSULE ORAL 2 TIMES DAILY
Status: DISCONTINUED | OUTPATIENT
Start: 2023-08-29 | End: 2023-09-01 | Stop reason: HOSPADM

## 2023-08-29 RX ORDER — SODIUM CHLORIDE 0.9 % (FLUSH) 0.9 %
10 SYRINGE (ML) INJECTION EVERY 12 HOURS SCHEDULED
Status: DISCONTINUED | OUTPATIENT
Start: 2023-08-29 | End: 2023-09-01 | Stop reason: HOSPADM

## 2023-08-29 RX ORDER — ONDANSETRON 4 MG/1
4 TABLET, FILM COATED ORAL EVERY 6 HOURS PRN
Status: DISCONTINUED | OUTPATIENT
Start: 2023-08-29 | End: 2023-09-01 | Stop reason: HOSPADM

## 2023-08-29 RX ORDER — HEPARIN SODIUM 10000 [USP'U]/100ML
18 INJECTION, SOLUTION INTRAVENOUS
Status: DISCONTINUED | OUTPATIENT
Start: 2023-08-29 | End: 2023-09-01

## 2023-08-29 RX ORDER — ATORVASTATIN CALCIUM 20 MG/1
20 TABLET, FILM COATED ORAL DAILY
COMMUNITY

## 2023-08-29 RX ORDER — HEPARIN SODIUM 10000 [USP'U]/100ML
18 INJECTION, SOLUTION INTRAVENOUS
Status: DISCONTINUED | OUTPATIENT
Start: 2023-08-29 | End: 2023-08-29

## 2023-08-29 RX ORDER — MAGNESIUM CARB/ALUMINUM HYDROX 105-160MG
150 TABLET,CHEWABLE ORAL ONCE
Status: COMPLETED | OUTPATIENT
Start: 2023-08-29 | End: 2023-08-29

## 2023-08-29 RX ORDER — MORPHINE SULFATE 2 MG/ML
2 INJECTION, SOLUTION INTRAMUSCULAR; INTRAVENOUS ONCE
Status: COMPLETED | OUTPATIENT
Start: 2023-08-29 | End: 2023-08-29

## 2023-08-29 RX ORDER — DIPHENHYDRAMINE HYDROCHLORIDE 50 MG/ML
25 INJECTION INTRAMUSCULAR; INTRAVENOUS ONCE
Status: COMPLETED | OUTPATIENT
Start: 2023-08-29 | End: 2023-08-29

## 2023-08-29 RX ORDER — BISACODYL 5 MG/1
5 TABLET, DELAYED RELEASE ORAL DAILY PRN
Status: DISCONTINUED | OUTPATIENT
Start: 2023-08-29 | End: 2023-09-01 | Stop reason: HOSPADM

## 2023-08-29 RX ORDER — NITROGLYCERIN 0.4 MG/1
0.4 TABLET SUBLINGUAL
Status: DISCONTINUED | OUTPATIENT
Start: 2023-08-29 | End: 2023-09-01 | Stop reason: HOSPADM

## 2023-08-29 RX ADMIN — HEPARIN SODIUM 18 UNITS/KG/HR: 10000 INJECTION, SOLUTION INTRAVENOUS at 21:29

## 2023-08-29 RX ADMIN — DIPHENHYDRAMINE HYDROCHLORIDE 25 MG: 50 INJECTION, SOLUTION INTRAMUSCULAR; INTRAVENOUS at 20:47

## 2023-08-29 RX ADMIN — MORPHINE SULFATE 2 MG: 2 INJECTION, SOLUTION INTRAMUSCULAR; INTRAVENOUS at 22:36

## 2023-08-29 RX ADMIN — IOPAMIDOL 93 ML: 755 INJECTION, SOLUTION INTRAVENOUS at 20:45

## 2023-08-29 RX ADMIN — MAGNESIUM CITRATE 150 ML: 1.75 LIQUID ORAL at 21:14

## 2023-08-29 NOTE — ED PROVIDER NOTES
Time: 4:24 PM EDT  Date of encounter:  8/29/2023  Independent Historian/Clinical History and Information was obtained by:   Patient    History is limited by: N/A    Chief Complaint   Patient presents with    Cough    Constipation    Headache         History of Present Illness:  Patient is a 58 y.o. year old male who presents to the emergency department for evaluation of productive cough, headache, constipation.  Patient reports noticing dark-colored blood in his sputum today after coughing.  Patient also reports chills.  Patient reports he was seen here 3 days ago and diagnosed with pneumonia and has been compliant with taking his prescribed antibiotics, also reports he has not had a bowel movement since that time.  Patient reports he took a bottle of mag citrate at approximately 1300 today with no bowel movement yet.  Patient reports he has been taking hydrocodone for his shoulder and has also been taking stool softeners. (DEMARCUS Alves, provider in triage)     Patient states he has had chest tightness and shortness of air today.  Patient had a shoulder surgery 2 weeks ago and has not been as active as he normally is.    HPI    Patient Care Team  Primary Care Provider: Maite Montyoa DO    Past Medical History:     No Known Allergies  Past Medical History:   Diagnosis Date    Acid reflux     Back pain     Cancer     skin    Hyperlipidemia     Hypertension     Insomnia     PTSD (post-traumatic stress disorder)     no isssues when waking from anesthesia     Past Surgical History:   Procedure Laterality Date    BACK SURGERY      x2    COLONOSCOPY N/A 01/03/2023    Procedure: COLONOSCOPY WITH POLYPECTOMY;  Surgeon: Jose Hopson MD;  Location: [facility] ENDOSCOPY;  Service: General;  Laterality: N/A;  COLON POLYP, SUBOPTIMAL PREP    ENDOSCOPY N/A 01/03/2023    Procedure: ESOPHAGOGASTRODUODENOSCOPY WITH BIOPSIES, POLYPECTOMIES;  Surgeon: Jose Hopson MD;  Location: [facility] ENDOSCOPY;  Service: General;   Laterality: N/A;  HIATAL HERNIA, GASTRIC POLYPS    KNEE SURGERY Right     OTHER SURGICAL HISTORY      CANCER REMOVAL FACE    SHOULDER ARTHROSCOPY Right     SHOULDER ARTHROSCOPY W/ ROTATOR CUFF REPAIR Right 8/17/2023    Procedure: SHOULDER ARTHROSCOPY WITH SUBCROMIAL DECOMPRESSION AND DISTAL CLAVICULECTOMY, OPEN BICEPS TENODESIS;  Surgeon: Marek Gaviria MD;  Location: [facility];  Service: Orthopedics;  Laterality: Right;     Family History   Problem Relation Age of Onset    Malig Hyperthermia Neg Hx        Home Medications:  Prior to Admission medications    Medication Sig Start Date End Date Taking? Authorizing Provider   amLODIPine (NORVASC) 5 MG tablet Has not been taking for over a year    Emergency, Nurse AMY Rodriguez   azithromycin (Zithromax Z-Al) 250 MG tablet Take 2 tablets by mouth on day 1, then 1 tablet daily on days 2-5 8/26/23   Aldo Candelario DO   cyclobenzaprine (FLEXERIL) 10 MG tablet cyclobenzaprine 10 mg oral tablet take 1 tablet by oral route As needed   Active    Emergency, Nurse AMY Rodriguez   HYDROcodone-acetaminophen (NORCO)  MG per tablet Take 1 tablet by mouth Every 6 (Six) Hours As Needed for Moderate Pain. 8/28/23   Sanju Saha MD   HYDROcodone-acetaminophen (NORCO) 7.5-325 MG per tablet Take 1 tablet by mouth Every 6 (Six) Hours As Needed for Moderate Pain. 8/26/23   Aldo Candelario DO   pantoprazole (Protonix) 40 MG EC tablet Take 1 tablet by mouth Daily.  Patient taking differently: Take 1 tablet by mouth 2 (Two) Times a Day. 12/29/22 12/29/23  Jose Hopson MD   traZODone (DESYREL) 100 MG tablet Take 3 tablets by mouth Every Night.    Emergency, Nurse AMY Rodriguez   UNKNOWN TO PATIENT Patient unaware of name of medication for Cholesterol, does not take medication    Provider, MD Anila        Social History:   Social History     Tobacco Use    Smoking status: Never    Smokeless tobacco: Current     Types: Snuff   Vaping Use    Vaping Use: Never used   Substance Use Topics  "   Alcohol use: Yes     Comment: RARELY    Drug use: Not Currently         Review of Systems:  Review of Systems   Constitutional:  Positive for chills. Negative for fever.   HENT:  Negative for congestion and sore throat.    Respiratory:  Positive for cough and shortness of breath.    Cardiovascular:  Positive for chest pain.   Gastrointestinal:  Positive for constipation.   Musculoskeletal:  Positive for arthralgias (right shoulder).      Physical Exam:  /87   Pulse 73   Temp 98.6 °F (37 °C) (Oral)   Resp 24   Ht 182.9 cm (72\")   Wt 90.7 kg (199 lb 15.3 oz)   SpO2 90%   BMI 27.12 kg/m²         Physical Exam  Vitals and nursing note reviewed.   Constitutional:       General: He is not in acute distress.     Appearance: Normal appearance. He is not toxic-appearing.   HENT:      Head: Normocephalic and atraumatic.      Jaw: There is normal jaw occlusion.      Nose: Nose normal. No congestion.      Mouth/Throat:      Mouth: Mucous membranes are moist.   Eyes:      General: Lids are normal.      Extraocular Movements: Extraocular movements intact.      Conjunctiva/sclera: Conjunctivae normal.      Pupils: Pupils are equal, round, and reactive to light.   Cardiovascular:      Rate and Rhythm: Normal rate and regular rhythm.      Pulses: Normal pulses.      Heart sounds: Normal heart sounds.   Pulmonary:      Effort: Pulmonary effort is normal. No respiratory distress.      Breath sounds: Normal breath sounds. No wheezing or rhonchi.   Abdominal:      General: Abdomen is flat. There is no distension.      Palpations: Abdomen is soft.      Tenderness: There is no abdominal tenderness. There is no guarding or rebound.   Musculoskeletal:         General: Normal range of motion.      Cervical back: Normal range of motion and neck supple.      Right lower leg: No edema.      Left lower leg: No edema.   Skin:     General: Skin is warm and dry.      Coloration: Skin is not cyanotic.   Neurological:      Mental " Status: He is alert and oriented to person, place, and time. Mental status is at baseline.   Psychiatric:         Attention and Perception: Attention and perception normal.         Mood and Affect: Mood normal.                    Procedures:  Procedures      Medical Decision Making:      Comorbidities that affect care:    Hypertension    External Notes reviewed:    Previous Clinic Note: 8/28/2023 for shoulder surgery follow-up      The following orders were placed and all results were independently analyzed by me:  Orders Placed This Encounter   Procedures    COVID PRE-OP / PRE-PROCEDURE SCREENING ORDER (NO ISOLATION) - Swab, Nasopharynx    Influenza Antigen, Rapid - Swab, Nasopharynx    COVID-19,CEPHEID/JON,COR/OMI/PAD/SPENCER/MAD IN-HOUSE(OR EMERGENT/ADD-ON),NP SWAB IN TRANSPORT MEDIA 3-4 HR TAT, RT-PCR - Swab, Nasopharynx    XR Chest 1 View    XR Abdomen KUB    CT Chest With Contrast Diagnostic    [place] Draw    Urinalysis With Microscopic If Indicated (No Culture) - Urine, Clean Catch    [place] Draw    Comprehensive Metabolic Panel    Procalcitonin    BNP    CBC Auto Differential    Single High Sensitivity Troponin T    Protime-INR    aPTT    aPTT    Notify Provider Platelet Count Less Than 54073    Notify Provider if PTT Not in Therapeutic Range After 24 Hours    Stop Infusion & Notify Provider if Bleeding Occurs    RN To Release aPTT Order 6 Hours After Heparin Bolus & 6 Hours After Any Heparin Rate Change    After 2 Consecutive Therapeutic aPTTs, Obtain aPTT Daily.  If a Rate Adjustment is Necessary, Resume Every 6 Hour aPTT Draws    Inpatient Hospitalist Consult    POCT Occult Blood Stool    ECG 12 Lead Dyspnea    Insert peripheral IV    Green Top (Gel)    Lavender Top    Gold Top - SST    Light Blue Top    CBC & Differential    Green Top (Gel)    Lavender Top    Gold Top - SST    Light Blue Top    CBC & Differential       Medications Given in the Emergency Department:  Medications   sodium chloride 0.9 %  flush 10 mL (has no administration in time range)   heparin bolus from bag 7,300 Units (has no administration in time range)   heparin 68001 units/250 mL (100 units/mL) in 0.45 % NaCl infusion (has no administration in time range)   heparin bolus from bag 4,500 Units (has no administration in time range)   heparin bolus from bag 2,300 Units (has no administration in time range)   diphenhydrAMINE (BENADRYL) injection 25 mg (25 mg Intravenous Given 8/29/23 2047)   iopamidol (ISOVUE-370) 76 % injection 100 mL (93 mL Intravenous Given 8/29/23 2045)   magnesium citrate solution 150 mL (150 mL Oral Given 8/29/23 2114)        ED Course:    The patient was initially evaluated in the triage area where orders were placed. The patient was later dispositioned by Jd Correa PA-C.      The patient was advised to stay for completion of workup which includes but is not limited to communication of labs and radiological results, reassessment and plan. The patient was advised that leaving prior to disposition by a provider could result in critical findings that are not communicated to the patient.          Labs:    Lab Results (last 24 hours)       Procedure Component Value Units Date/Time    CBC & Differential [[id number]]  (Abnormal) Collected: 08/29/23 1630    Specimen: Blood Updated: 08/29/23 1907    Narrative:      The following orders were created for panel order CBC & Differential.  Procedure                               Abnormality         Status                     ---------                               -----------         ------                     CBC Auto Differential[[id number]]        Abnormal            Final result                 Please view results for these tests on the individual orders.    Comprehensive Metabolic Panel [[id number]]  (Abnormal) Collected: 08/29/23 1630    Specimen: Blood Updated: 08/29/23 1905     Glucose 136 mg/dL      BUN 9 mg/dL      Creatinine 1.16 mg/dL      Sodium 134 mmol/L      Potassium  "4.3 mmol/L      Chloride 97 mmol/L      CO2 25.1 mmol/L      Calcium 8.7 mg/dL      Total Protein 6.2 g/dL      Albumin 3.2 g/dL      ALT (SGPT) 21 U/L      AST (SGOT) 21 U/L      Alkaline Phosphatase 142 U/L      Total Bilirubin 0.3 mg/dL      Globulin 3.0 gm/dL      A/G Ratio 1.1 g/dL      BUN/Creatinine Ratio 7.8     Anion Gap 11.9 mmol/L      eGFR 73.0 mL/min/1.73     Narrative:      GFR Normal >60  Chronic Kidney Disease <60  Kidney Failure <15      Procalcitonin [[id number]]  (Normal) Collected: 08/29/23 1630    Specimen: Blood Updated: 08/29/23 1910     Procalcitonin 0.23 ng/mL     Narrative:      As a Marker for Sepsis (Non-Neonates):    1. <0.5 ng/mL represents a low risk of severe sepsis and/or septic shock.  2. >2 ng/mL represents a high risk of severe sepsis and/or septic shock.    As a Marker for Lower Respiratory Tract Infections that require antibiotic therapy:    PCT on Admission    Antibiotic Therapy       6-12 Hrs later    >0.5                Strongly Recommended  >0.25 - <0.5        Recommended  0.1 - 0.25          Discouraged              Remeasure/reassess PCT  <0.1                Strongly Discouraged     Remeasure/reassess PCT    As 28 day mortality risk marker: \"Change in Procalcitonin Result\" (>80% or <=80%) if Day 0 (or Day 1) and Day 4 values are available. Refer to http://www.[facility]-pct-calculator.com    Change in PCT <=80%  A decrease of PCT levels below or equal to 80% defines a positive change in PCT test result representing a higher risk for 28-day all-cause mortality of patients diagnosed with severe sepsis for septic shock.    Change in PCT >80%  A decrease of PCT levels of more than 80% defines a negative change in PCT result representing a lower risk for 28-day all-cause mortality of patients diagnosed with severe sepsis or septic shock.    This test is Prognostic not Diagnostic, if elevated correlate with clinical findings before administering antibiotic treatment.        BNP " [[id number]]  (Normal) Collected: 08/29/23 1630    Specimen: Blood Updated: 08/29/23 1902     proBNP 73.5 pg/mL     Narrative:      Among patients with dyspnea, NT-proBNP is highly sensitive for the detection of acute congestive heart failure. In addition NT-proBNP of <300 pg/ml effectively rules out acute congestive heart failure with 99% negative predictive value.      CBC Auto Differential [[id number]]  (Abnormal) Collected: 08/29/23 1630    Specimen: Blood Updated: 08/29/23 1907     WBC 8.45 10*3/mm3      RBC 4.15 10*6/mm3      Hemoglobin 12.3 g/dL      Hematocrit 36.0 %      MCV 86.7 fL      MCH 29.6 pg      MCHC 34.2 g/dL      RDW 12.8 %      RDW-SD 40.9 fl      MPV 11.2 fL      Platelets 164 10*3/mm3      Neutrophil % 78.8 %      Lymphocyte % 8.3 %      Monocyte % 9.1 %      Eosinophil % 2.8 %      Basophil % 0.4 %      Immature Grans % 0.6 %      Neutrophils, Absolute 6.66 10*3/mm3      Lymphocytes, Absolute 0.70 10*3/mm3      Monocytes, Absolute 0.77 10*3/mm3      Eosinophils, Absolute 0.24 10*3/mm3      Basophils, Absolute 0.03 10*3/mm3      Immature Grans, Absolute 0.05 10*3/mm3      nRBC 0.0 /100 WBC     Urinalysis With Microscopic If Indicated (No Culture) - Urine, Clean Catch [[id number]]  (Abnormal) Collected: 08/29/23 1644    Specimen: Urine, Clean Catch Updated: 08/29/23 1655     Color, UA Yellow     Appearance, UA Clear     pH, UA 6.0     Specific Gravity, UA 1.021     Glucose, UA Negative     Ketones, UA Negative     Bilirubin, UA Negative     Blood, UA Negative     Protein, UA Trace     Leuk Esterase, UA Negative     Nitrite, UA Negative     Urobilinogen, UA 1.0 E.U./dL    Narrative:      Urine microscopic not indicated.    COVID PRE-OP / PRE-PROCEDURE SCREENING ORDER (NO ISOLATION) - Swab, Nasopharynx [[id number]]  (Normal) Collected: 08/29/23 1647    Specimen: Swab from Nasopharynx Updated: 08/29/23 6828    Narrative:      The following orders were created for panel order COVID PRE-OP /  PRE-PROCEDURE SCREENING ORDER (NO ISOLATION) - Swab, Nasopharynx.  Procedure                               Abnormality         Status                     ---------                               -----------         ------                     COVID-19,CEPHEID/JON,CO...[[id number]]  Normal              Final result                 Please view results for these tests on the individual orders.    Influenza Antigen, Rapid - Swab, Nasopharynx [[id number]]  (Normal) Collected: 08/29/23 1647    Specimen: Swab from Nasopharynx Updated: 08/29/23 1721     Influenza A Ag, EIA Negative     Influenza B Ag, EIA Negative    COVID-19,CEPHEID/JON,COR/OMI/PAD/SPENCER/MAD IN-HOUSE(OR EMERGENT/ADD-ON),NP SWAB IN TRANSPORT MEDIA 3-4 HR TAT, RT-PCR - Swab, Nasopharynx [[id number]]  (Normal) Collected: 08/29/23 1647    Specimen: Swab from Nasopharynx Updated: 08/29/23 1735     COVID19 Not Detected    Narrative:      Fact sheet for providers: https://www.fda.gov/media/252863/download     Fact sheet for patients: https://www.fda.gov/media/953664/download  Fact sheet for providers: https://www.fda.gov/media/875278/download     Fact sheet for patients: https://www.fda.gov/media/154946/download             Imaging:    CT Chest With Contrast Diagnostic    Result Date: 8/29/2023  PROCEDURE: CT CHEST W CONTRAST DIAGNOSTIC  COMPARISON:  [facility], CT, CT ABDOMEN PELVIS W CONTRAST, 8/26/2023, 12:27. INDICATIONS: SHORTNESS OF BREATH AND COUGH, PNEUMONIA X 3 DAYS  TECHNIQUE: After obtaining the patient's consent, CT images were obtained with non-ionic intravenous contrast material.   PROTOCOL:   Pulmonary embolism imaging protocol performed    RADIATION:   DLP: 463.5 mGy*cm   Automated exposure control was utilized to minimize radiation dose. CONTRAST: 93 cc Isovue 370 I.V. LABS:   eGFR: 73 ml/min/1.73m2  FINDINGS:  There is bilateral acute pulmonary embolic disease.  There is thrombus within the right and left main pulmonary arteries  extending into the lobar branches bilaterally.  There also defects within the segmental branches to the left lower lobe, the anterior segment of the right upper lobe, the apical posterior segment of the left upper lobe, in the anterior segment of the left upper lobe.  There is extensive airspace disease in the left lower lobe.  On the chest x-ray this is felt to be secondary to pneumonia.  This also could be due to pulmonary infarction.  There is a small-moderate left pleural effusion with fluid extending into the major fissure.  There is mild subsegmental atelectasis in the right lower lobe and lingula.  There is no definite evidence of right heart strain.  The heart size is normal.  There are faint coronary artery atherosclerotic vascular calcifications.  There are no enlarged hilar or mediastinal lymph nodes.  There are no acute findings beneath the hemidiaphragms.  There are no suspicious osteolytic or sclerotic lesions within the bony structures.  There are degenerative changes within the visualized spine.        1. Acute bilateral pulmonary embolic disease as discussed in detail above. 2. Extensive left lower lobe airspace disease.  This was felt to represent pneumonia on the chest x-ray.  Some of this could be due to pulmonary infarction. 3. Small to moderate sized left pleural effusion. 4. No definite evidence of right heart strain. 5. The abnormal results were discussed with Dr. Howe by telephone.     KEVEN GOMEZ MD       Electronically Signed and Approved By: KEVEN GOMEZ MD on 8/29/2023 at 21:04             XR Chest 1 View    Result Date: 8/29/2023  PROCEDURE: XR CHEST 1 VW  COMPARISON: [facility], , XR CHEST 1 VW, 8/26/2023, 15:44.  INDICATIONS: Cough  FINDINGS:  There is worsening consolidation in the left lung base felt to represent pneumonia with a questionable small pleural effusion.  The right lung and pleural space are clear.  The heart size is normal.  The pulmonary vascular markings  are normal.  There are chronic age-related changes involving the bony thorax.       Worsening consolidation in the left lung base felt to represent pneumonia with a questionable small pleural effusion.       KEVEN GOMEZ MD       Electronically Signed and Approved By: KEVEN GOMEZ MD on 8/29/2023 at 17:41             XR Abdomen KUB    Result Date: 8/29/2023  PROCEDURE: XR ABDOMEN KUB  COMPARISON: None  INDICATIONS: ABDOMINAL PAIN; CONSTIPATION  FINDINGS:  The bowel gas pattern is normal.  The stool burden is also considered normal.  There are no significant intra-abdominal calcifications.  There are degenerative changes in the thoracolumbar spine.  There is abnormal density in the left lower chest consistent with pneumonia and a small pleural effusion.        1. Unremarkable bowel gas pattern.  The stool burden is normal. 2. Left basilar pneumonia with a small pleural effusion.     KEVEN GOMEZ MD       Electronically Signed and Approved By: KEVEN GOMEZ MD on 8/29/2023 at 19:20                Differential Diagnosis and Discussion:      Chest Pain:  Based on the patient's signs and symptoms, I considered aortic dissection, myocardial infaction, pulmonary embolism, cardiac tamponade, pericarditis, pneumothorax, musculoskeletal chest pain and other differential diagnosis as an etiology of the patient's chest pain.   Dyspnea: Differential diagnosis includes but is not limited to metabolic acidosis, neurological disorders, psychogenic, asthma, pneumothorax, upper airway obstruction, COPD, pneumonia, noncardiogenic pulmonary edema, interstitial lung disease, anemia, congestive heart failure, and pulmonary embolism    All labs were reviewed and interpreted by me.  All X-rays impressions were independently interpreted by me.  EKG was interpreted by supervising attending.  CT scan radiology impression was interpreted by me.    MDM     Amount and/or Complexity of Data Reviewed  Clinical lab tests: reviewed       Patient's CT showed  multiple bilateral PEs.  CT chest also showed Extensive left lower lobe airspace disease.  I started heparin and called the hospitalist for admission.  Patient is currently 94% on room air.        Patient Care Considerations:          Consultants/Shared Management Plan:    Hospitalist: I have discussed the case with Dr. Faulkner who agrees to accept the patient for admission.    Social Determinants of Health:    Patient is independent, reliable, and has access to care.       Disposition and Care Coordination:    Admit:   Through independent evaluation of the patient's history, physical, and imperical data, the patient meets criteria for observation/admission to the hospital.        Final diagnoses:   Acute pulmonary embolism, unspecified pulmonary embolism type, unspecified whether acute cor pulmonale present   Pneumonia due to infectious organism, unspecified laterality, unspecified part of lung        ED Disposition       ED Disposition   Decision to Admit    Condition   --    Comment   --               This medical record created using voice recognition software.             Jd Correa PA-C  08/29/23 0950

## 2023-08-30 ENCOUNTER — APPOINTMENT (OUTPATIENT)
Dept: CARDIOLOGY | Facility: HOSPITAL | Age: 58
DRG: 176 | End: 2023-08-30
Payer: OTHER GOVERNMENT

## 2023-08-30 LAB
ANION GAP SERPL CALCULATED.3IONS-SCNC: 9.6 MMOL/L (ref 5–15)
APTT PPP: 109.9 SECONDS (ref 78–95.9)
APTT PPP: 180.4 SECONDS (ref 78–95.9)
APTT PPP: 35.7 SECONDS (ref 78–95.9)
APTT PPP: 37.2 SECONDS (ref 78–95.9)
APTT PPP: 76.9 SECONDS (ref 78–95.9)
BASOPHILS # BLD AUTO: 0.05 10*3/MM3 (ref 0–0.2)
BASOPHILS NFR BLD AUTO: 0.7 % (ref 0–1.5)
BH CV LOWER VASCULAR LEFT COMMON FEMORAL AUGMENT: NORMAL
BH CV LOWER VASCULAR LEFT COMMON FEMORAL COMPETENT: NORMAL
BH CV LOWER VASCULAR LEFT COMMON FEMORAL COMPRESS: NORMAL
BH CV LOWER VASCULAR LEFT COMMON FEMORAL PHASIC: NORMAL
BH CV LOWER VASCULAR LEFT COMMON FEMORAL SPONT: NORMAL
BH CV LOWER VASCULAR LEFT DISTAL FEMORAL AUGMENT: NORMAL
BH CV LOWER VASCULAR LEFT DISTAL FEMORAL COMPETENT: NORMAL
BH CV LOWER VASCULAR LEFT DISTAL FEMORAL COMPRESS: NORMAL
BH CV LOWER VASCULAR LEFT DISTAL FEMORAL PHASIC: NORMAL
BH CV LOWER VASCULAR LEFT DISTAL FEMORAL SPONT: NORMAL
BH CV LOWER VASCULAR LEFT GASTRONEMIUS COMPRESS: NORMAL
BH CV LOWER VASCULAR LEFT GREATER SAPH AK COMPRESS: NORMAL
BH CV LOWER VASCULAR LEFT GREATER SAPH BK COMPRESS: NORMAL
BH CV LOWER VASCULAR LEFT LESSER SAPH COMPRESS: NORMAL
BH CV LOWER VASCULAR LEFT MID FEMORAL COMPRESS: NORMAL
BH CV LOWER VASCULAR LEFT PERONEAL COMPRESS: NORMAL
BH CV LOWER VASCULAR LEFT POPLITEAL AUGMENT: NORMAL
BH CV LOWER VASCULAR LEFT POPLITEAL COMPETENT: NORMAL
BH CV LOWER VASCULAR LEFT POPLITEAL COMPRESS: NORMAL
BH CV LOWER VASCULAR LEFT POPLITEAL PHASIC: NORMAL
BH CV LOWER VASCULAR LEFT POPLITEAL SPONT: NORMAL
BH CV LOWER VASCULAR LEFT POSTERIOR TIBIAL AUGMENT: NORMAL
BH CV LOWER VASCULAR LEFT POSTERIOR TIBIAL COMPRESS: NORMAL
BH CV LOWER VASCULAR LEFT PROXIMAL FEMORAL COMPRESS: NORMAL
BH CV LOWER VASCULAR RIGHT COMMON FEMORAL AUGMENT: NORMAL
BH CV LOWER VASCULAR RIGHT COMMON FEMORAL COMPETENT: NORMAL
BH CV LOWER VASCULAR RIGHT COMMON FEMORAL COMPRESS: NORMAL
BH CV LOWER VASCULAR RIGHT COMMON FEMORAL PHASIC: NORMAL
BH CV LOWER VASCULAR RIGHT COMMON FEMORAL SPONT: NORMAL
BH CV LOWER VASCULAR RIGHT DISTAL FEMORAL AUGMENT: NORMAL
BH CV LOWER VASCULAR RIGHT DISTAL FEMORAL COMPETENT: NORMAL
BH CV LOWER VASCULAR RIGHT DISTAL FEMORAL COMPRESS: NORMAL
BH CV LOWER VASCULAR RIGHT DISTAL FEMORAL PHASIC: NORMAL
BH CV LOWER VASCULAR RIGHT DISTAL FEMORAL SPONT: NORMAL
BH CV LOWER VASCULAR RIGHT GASTRONEMIUS COMPRESS: NORMAL
BH CV LOWER VASCULAR RIGHT GREATER SAPH AK COMPRESS: NORMAL
BH CV LOWER VASCULAR RIGHT GREATER SAPH BK COMPRESS: NORMAL
BH CV LOWER VASCULAR RIGHT LESSER SAPH COMPRESS: NORMAL
BH CV LOWER VASCULAR RIGHT MID FEMORAL COMPRESS: NORMAL
BH CV LOWER VASCULAR RIGHT PERONEAL COMPRESS: NORMAL
BH CV LOWER VASCULAR RIGHT POPLITEAL AUGMENT: NORMAL
BH CV LOWER VASCULAR RIGHT POPLITEAL COMPETENT: NORMAL
BH CV LOWER VASCULAR RIGHT POPLITEAL COMPRESS: NORMAL
BH CV LOWER VASCULAR RIGHT POPLITEAL PHASIC: NORMAL
BH CV LOWER VASCULAR RIGHT POPLITEAL SPONT: NORMAL
BH CV LOWER VASCULAR RIGHT POSTERIOR TIBIAL AUGMENT: NORMAL
BH CV LOWER VASCULAR RIGHT POSTERIOR TIBIAL COMPRESS: NORMAL
BH CV LOWER VASCULAR RIGHT PROXIMAL FEMORAL COMPRESS: NORMAL
BUN SERPL-MCNC: 10 MG/DL (ref 6–20)
BUN/CREAT SERPL: 10 (ref 7–25)
CALCIUM SPEC-SCNC: 8.9 MG/DL (ref 8.6–10.5)
CHLORIDE SERPL-SCNC: 100 MMOL/L (ref 98–107)
CO2 SERPL-SCNC: 24.4 MMOL/L (ref 22–29)
CREAT SERPL-MCNC: 1 MG/DL (ref 0.76–1.27)
DEPRECATED RDW RBC AUTO: 40.9 FL (ref 37–54)
EGFRCR SERPLBLD CKD-EPI 2021: 87.2 ML/MIN/1.73
EOSINOPHIL # BLD AUTO: 0.32 10*3/MM3 (ref 0–0.4)
EOSINOPHIL NFR BLD AUTO: 4.5 % (ref 0.3–6.2)
ERYTHROCYTE [DISTWIDTH] IN BLOOD BY AUTOMATED COUNT: 12.9 % (ref 12.3–15.4)
GLUCOSE SERPL-MCNC: 121 MG/DL (ref 65–99)
HCT VFR BLD AUTO: 37.2 % (ref 37.5–51)
HGB BLD-MCNC: 12.8 G/DL (ref 13–17.7)
IMM GRANULOCYTES # BLD AUTO: 0.05 10*3/MM3 (ref 0–0.05)
IMM GRANULOCYTES NFR BLD AUTO: 0.7 % (ref 0–0.5)
LYMPHOCYTES # BLD AUTO: 0.85 10*3/MM3 (ref 0.7–3.1)
LYMPHOCYTES NFR BLD AUTO: 11.9 % (ref 19.6–45.3)
MCH RBC QN AUTO: 29.7 PG (ref 26.6–33)
MCHC RBC AUTO-ENTMCNC: 34.4 G/DL (ref 31.5–35.7)
MCV RBC AUTO: 86.3 FL (ref 79–97)
MONOCYTES # BLD AUTO: 0.62 10*3/MM3 (ref 0.1–0.9)
MONOCYTES NFR BLD AUTO: 8.7 % (ref 5–12)
NEUTROPHILS NFR BLD AUTO: 5.25 10*3/MM3 (ref 1.7–7)
NEUTROPHILS NFR BLD AUTO: 73.5 % (ref 42.7–76)
NRBC BLD AUTO-RTO: 0 /100 WBC (ref 0–0.2)
PLATELET # BLD AUTO: 185 10*3/MM3 (ref 140–450)
PMV BLD AUTO: 9.9 FL (ref 6–12)
POTASSIUM SERPL-SCNC: 4.2 MMOL/L (ref 3.5–5.2)
RBC # BLD AUTO: 4.31 10*6/MM3 (ref 4.14–5.8)
SODIUM SERPL-SCNC: 134 MMOL/L (ref 136–145)
WBC NRBC COR # BLD: 7.14 10*3/MM3 (ref 3.4–10.8)

## 2023-08-30 PROCEDURE — 85025 COMPLETE CBC W/AUTO DIFF WBC: CPT

## 2023-08-30 PROCEDURE — 93970 EXTREMITY STUDY: CPT | Performed by: SURGERY

## 2023-08-30 PROCEDURE — 80048 BASIC METABOLIC PNL TOTAL CA: CPT | Performed by: FAMILY MEDICINE

## 2023-08-30 PROCEDURE — 25010000002 HEPARIN (PORCINE) 25000-0.45 UT/250ML-% SOLUTION

## 2023-08-30 PROCEDURE — 85730 THROMBOPLASTIN TIME PARTIAL: CPT | Performed by: FAMILY MEDICINE

## 2023-08-30 PROCEDURE — 25010000002 CEFTRIAXONE PER 250 MG: Performed by: FAMILY MEDICINE

## 2023-08-30 PROCEDURE — 93970 EXTREMITY STUDY: CPT

## 2023-08-30 RX ORDER — SODIUM CHLORIDE 9 MG/ML
75 INJECTION, SOLUTION INTRAVENOUS CONTINUOUS
Status: DISCONTINUED | OUTPATIENT
Start: 2023-08-30 | End: 2023-09-01 | Stop reason: HOSPADM

## 2023-08-30 RX ORDER — HYDROCODONE BITARTRATE AND ACETAMINOPHEN 10; 325 MG/1; MG/1
1 TABLET ORAL EVERY 6 HOURS PRN
Status: DISCONTINUED | OUTPATIENT
Start: 2023-08-30 | End: 2023-09-01 | Stop reason: HOSPADM

## 2023-08-30 RX ORDER — TRAZODONE HYDROCHLORIDE 100 MG/1
300 TABLET ORAL NIGHTLY
Status: DISCONTINUED | OUTPATIENT
Start: 2023-08-30 | End: 2023-09-01 | Stop reason: HOSPADM

## 2023-08-30 RX ORDER — ATORVASTATIN CALCIUM 20 MG/1
20 TABLET, FILM COATED ORAL DAILY
Status: DISCONTINUED | OUTPATIENT
Start: 2023-08-30 | End: 2023-09-01 | Stop reason: HOSPADM

## 2023-08-30 RX ORDER — PANTOPRAZOLE SODIUM 20 MG/1
20 TABLET, DELAYED RELEASE ORAL 2 TIMES DAILY
Status: DISCONTINUED | OUTPATIENT
Start: 2023-08-30 | End: 2023-09-01 | Stop reason: HOSPADM

## 2023-08-30 RX ORDER — ACETAMINOPHEN 325 MG/1
650 TABLET ORAL EVERY 6 HOURS PRN
Status: DISCONTINUED | OUTPATIENT
Start: 2023-08-30 | End: 2023-09-01 | Stop reason: HOSPADM

## 2023-08-30 RX ADMIN — Medication 10 ML: at 09:13

## 2023-08-30 RX ADMIN — PANTOPRAZOLE 20 MG: 20 TABLET, DELAYED RELEASE ORAL at 13:33

## 2023-08-30 RX ADMIN — DOXYCYCLINE 100 MG: 100 INJECTION, POWDER, LYOPHILIZED, FOR SOLUTION INTRAVENOUS at 00:02

## 2023-08-30 RX ADMIN — CEFTRIAXONE SODIUM 1000 MG: 1 INJECTION, SOLUTION INTRAVENOUS at 00:01

## 2023-08-30 RX ADMIN — ATORVASTATIN CALCIUM 20 MG: 20 TABLET, FILM COATED ORAL at 15:43

## 2023-08-30 RX ADMIN — SODIUM CHLORIDE 75 ML/HR: 9 INJECTION, SOLUTION INTRAVENOUS at 17:26

## 2023-08-30 RX ADMIN — HYDROCODONE BITARTRATE AND ACETAMINOPHEN 1 TABLET: 10; 325 TABLET ORAL at 13:33

## 2023-08-30 RX ADMIN — TRAZODONE HYDROCHLORIDE 300 MG: 100 TABLET ORAL at 21:02

## 2023-08-30 RX ADMIN — DOXYCYCLINE 100 MG: 100 INJECTION, POWDER, LYOPHILIZED, FOR SOLUTION INTRAVENOUS at 09:13

## 2023-08-30 RX ADMIN — Medication 10 ML: at 00:03

## 2023-08-30 RX ADMIN — PANTOPRAZOLE 20 MG: 20 TABLET, DELAYED RELEASE ORAL at 21:02

## 2023-08-30 RX ADMIN — HEPARIN SODIUM 18 UNITS/KG/HR: 10000 INJECTION, SOLUTION INTRAVENOUS at 10:34

## 2023-08-30 RX ADMIN — ACETAMINOPHEN 650 MG: 325 TABLET ORAL at 15:43

## 2023-08-30 RX ADMIN — HYDROCODONE BITARTRATE AND ACETAMINOPHEN 1 TABLET: 5; 325 TABLET ORAL at 04:32

## 2023-08-30 RX ADMIN — SENNOSIDES AND DOCUSATE SODIUM 2 TABLET: 50; 8.6 TABLET ORAL at 00:07

## 2023-08-30 NOTE — PROGRESS NOTES
[facility]   Hospitalist Progress Note  Date: 2023  Patient Name: Pavan Perales  : 1965  MRN: [id number]  Date of admission: 2023      Subjective   Subjective     Chief Complaint: Follow-up shortness of breath    Summary:Pavan Perales is a 58 y.o. male with past medical history of hyperlipidemia, hypertension, insomnia, and PTSD.  Patient presented to the emergency department several days ago and was diagnosed with left sided pneumonia.  He was treated with oral antibiotics and discharged home.  Unfortunately the patient's condition did not improve and he continued to be very short of breath.  He said he started having increased coughing with left-sided chest pain with deep breaths.  He denied fever, chills, midsternal chest pain with radiation, nausea, vomiting, diaphoresis.  This time in the emergency department the patient was slightly hypoxic with O2 saturation of 90% on room air initially.  Because of this the ER provider obtained a CTA of the chest which demonstrated bilateral fairly extensive pulmonary emboli.  He also has left-sided infiltrates which the radiologist is unsure whether or not this is pneumonia versus complication from the pulmonary emboli.  Because of this however the ER physician felt that the patient should come in for further evaluation and treatment.  Patient admitted started on heparin drip.  Pro-Jatin within normal limits.  Findings on chest CT thought secondary to pulmonary infarct and not pneumonia.  Antibiotics stopped.    Interval Followup: Patient sitting up in bedside chair resting comfortably with family.  Patient indicates still having shortness of breath worse with activity.  Patient denies any new issues.  Does endorse some swelling in his right upper extremity.  Denies lower extremity swelling.  Afebrile overnight.  Sinus rhythm 70s to 110s on telemetry review.  Blood pressure within normal limits.  Satting well on room air.  Echocardiogram  pending.  Lower extremity venous duplex pending read.  No other issues per nursing.    Review of Systems  Constitutional: Negative for fatigue and fever.   HENT: Negative for sore throat and trouble swallowing.    Eyes: Negative for pain and discharge.   Respiratory: Positive for cough and shortness of breath.    Cardiovascular: Negative for chest pain and palpitations.   Gastrointestinal: Negative for abdominal pain, nausea and vomiting.   Endocrine: Negative for cold intolerance and heat intolerance.   Genitourinary: Negative for difficulty urinating and dysuria.   Musculoskeletal: Negative for back pain and neck stiffness.  Right shoulder pain  Skin: Negative for color change and rash.   Neurological: Negative for syncope and headaches.   Hematological: Negative for adenopathy.   Psychiatric/Behavioral: Negative for confusion and hallucinations.    Objective   Objective     Vitals:   Temp:  [98.6 °F (37 °C)-99.5 °F (37.5 °C)] 98.6 °F (37 °C)  Heart Rate:  [71-94] 83  Resp:  [18-24] 18  BP: (114-140)/() 114/64  Physical Exam   General: well-developed appearing stated age in no acute distress  HEENT: Normocephalic atraumatic moist membranes pupils equal round reactive light, no scleral icterus no conjunctival injection  Cardiovascular: regular rate and rhythm no murmurs rubs or gallops S1-S2, no lower extremity edema appreciated  Pulmonary: Clear to auscultation bilaterally crackles left lower lung field no wheezes or rhonchi symmetric chest expansion, unlabored, no conversational dyspnea appreciated  Gastrointestinal: Soft nontender nondistended positive bowel sounds all 4 quadrants no rebound or guarding  Musculoskeletal: No clubbing cyanosis, warm and well-perfused, calves soft symmetric nontender bilaterally, decreased range of motion right upper extremity at the shoulder  Skin: Clean dry without rashes  Neuro: Cranial nerves II through XII intact grossly no sensorimotor deficits appreciated bilateral  upper and lower extremities  Psych: Patient is calm cooperative and appropriate with exam not responding to internal stimuli  : No Stone catheter no bladder distention no suprapubic tenderness    Result Review    Result Review:  I have personally reviewed these results and agree with these findings:  [x]  Laboratory  LAB RESULTS:      Lab 08/30/23  1438 08/30/23  0816 08/30/23  0134 08/29/23  2322 08/29/23  2208 08/29/23  1630 08/26/23  1128   WBC  --   --  7.14  --   --  8.45 7.80   HEMOGLOBIN  --   --  12.8*  --   --  12.3* 14.3   HEMATOCRIT  --   --  37.2*  --   --  36.0* 43.3   PLATELETS  --   --  185  --   --  164 142   NEUTROS ABS  --   --  5.25  --   --  6.66 6.01   IMMATURE GRANS (ABS)  --   --  0.05  --   --  0.05 0.03   LYMPHS ABS  --   --  0.85  --   --  0.70 0.69*   MONOS ABS  --   --  0.62  --   --  0.77 0.85   EOS ABS  --   --  0.32  --   --  0.24 0.18   MCV  --   --  86.3  --   --  86.7 86.9   PROCALCITONIN  --   --   --   --   --  0.23  --    LACTATE  --   --   --   --   --   --  1.9   PROTIME  --   --   --   --  16.2*  --   --    APTT 76.9* 35.7* 37.2* 180.4* >200.0*  --   --          Lab 08/30/23  0134 08/29/23  1630 08/26/23  1128   SODIUM 134* 134* 135*   POTASSIUM 4.2 4.3 3.6   CHLORIDE 100 97* 101   CO2 24.4 25.1 24.1   ANION GAP 9.6 11.9 9.9   BUN 10 9 14   CREATININE 1.00 1.16 1.05   EGFR 87.2 73.0 82.3   GLUCOSE 121* 136* 137*   CALCIUM 8.9 8.7 8.7         Lab 08/29/23  1630 08/26/23  1128   TOTAL PROTEIN 6.2 6.3   ALBUMIN 3.2* 3.7   GLOBULIN 3.0 2.6   ALT (SGPT) 21 28   AST (SGOT) 21 18   BILIRUBIN 0.3 0.6   ALK PHOS 142* 104   LIPASE  --  31         Lab 08/29/23  2208 08/29/23  1630   PROBNP  --  73.5   HSTROP T <6  --    PROTIME 16.2*  --    INR 1.30*  --                  Brief Urine Lab Results  (Last result in the past 365 days)        Color   Clarity   Blood   Leuk Est   Nitrite   Protein   CREAT   Urine HCG        08/29/23 1644 Yellow   Clear   Negative   Negative   Negative    Trace                 Microbiology Results (last 10 days)       Procedure Component Value - Date/Time    COVID PRE-OP / PRE-PROCEDURE SCREENING ORDER (NO ISOLATION) - Swab, Nasopharynx [[id number]]  (Normal) Collected: 08/29/23 1647    Lab Status: Final result Specimen: Swab from Nasopharynx Updated: 08/29/23 1735    Narrative:      The following orders were created for panel order COVID PRE-OP / PRE-PROCEDURE SCREENING ORDER (NO ISOLATION) - Swab, Nasopharynx.  Procedure                               Abnormality         Status                     ---------                               -----------         ------                     COVID-19,CEPHEID/JON,CO...[[id number]]  Normal              Final result                 Please view results for these tests on the individual orders.    Influenza Antigen, Rapid - Swab, Nasopharynx [[id number]]  (Normal) Collected: 08/29/23 1647    Lab Status: Final result Specimen: Swab from Nasopharynx Updated: 08/29/23 1721     Influenza A Ag, EIA Negative     Influenza B Ag, EIA Negative    COVID-19,CEPHEID/JON,COR/OMI/PAD/SPENCER/MAD IN-HOUSE(OR EMERGENT/ADD-ON),NP SWAB IN TRANSPORT MEDIA 3-4 HR TAT, RT-PCR - Swab, Nasopharynx [[id number]]  (Normal) Collected: 08/29/23 1647    Lab Status: Final result Specimen: Swab from Nasopharynx Updated: 08/29/23 1735     COVID19 Not Detected    Narrative:      Fact sheet for providers: https://www.fda.gov/media/618913/download     Fact sheet for patients: https://www.fda.gov/media/597822/download  Fact sheet for providers: https://www.fda.gov/media/375595/download     Fact sheet for patients: https://www.fda.gov/media/277124/download            [x]  Microbiology  [x]  Radiology  CT Chest With Contrast Diagnostic    Result Date: 8/29/2023    1. Acute bilateral pulmonary embolic disease as discussed in detail above. 2. Extensive left lower lobe airspace disease.  This was felt to represent pneumonia on the chest x-ray.  Some of this could be due to  pulmonary infarction. 3. Small to moderate sized left pleural effusion. 4. No definite evidence of right heart strain. 5. The abnormal results were discussed with Dr. Howe by telephone.     KEVEN GOMEZ MD       Electronically Signed and Approved By: KEVEN GOMEZ MD on 8/29/2023 at 21:04             CT Abdomen Pelvis With Contrast    Result Date: 8/26/2023     1. No acute abdominopelvic process demonstrated  2. Atelectasis or pneumonia in the left lower lobe     JOLIE MURPHY MD       Electronically Signed and Approved By: JOLIE MURPHY MD on 8/26/2023 at 12:50             XR Chest 1 View    Result Date: 8/29/2023   Worsening consolidation in the left lung base felt to represent pneumonia with a questionable small pleural effusion.       KEVEN GOMEZ MD       Electronically Signed and Approved By: KEVEN GOMEZ MD on 8/29/2023 at 17:41             XR Chest 1 View    Result Date: 8/26/2023    1. Bibasilar airspace disease concerning for left basilar and potentially bilateral pneumonia       MARIANNE MACHADO MD       Electronically Signed and Approved By: MARIANNE MACHADO MD on 8/26/2023 at 16:51             XR Abdomen KUB    Result Date: 8/29/2023    1. Unremarkable bowel gas pattern.  The stool burden is normal. 2. Left basilar pneumonia with a small pleural effusion.     KEVEN GOMEZ MD       Electronically Signed and Approved By: KEVEN GOMEZ MD on 8/29/2023 at 19:20              [x]  EKG/Telemetry   []  Cardiology/Vascular   []  Pathology  []  Old records  [x]  Other:  Scheduled Meds:atorvastatin, 20 mg, Oral, Daily  pantoprazole, 20 mg, Oral, BID  senna-docusate sodium, 2 tablet, Oral, BID  sodium chloride, 10 mL, Intravenous, Q12H  traZODone, 300 mg, Oral, Nightly      Continuous Infusions:heparin, 18 Units/kg/hr, Last Rate: 20 Units/kg/hr (08/30/23 1532)  sodium chloride, 75 mL/hr, Last Rate: 75 mL/hr (08/30/23 1726)      PRN Meds:.  acetaminophen    senna-docusate sodium **AND** polyethylene glycol **AND** bisacodyl  **AND** bisacodyl    heparin    heparin    HYDROcodone-acetaminophen    HYDROcodone-acetaminophen    HYDROmorphone    nitroglycerin    ondansetron    sodium chloride    sodium chloride    sodium chloride      Assessment & Plan   Assessment / Plan     Assessment/Plan:  Acute bilateral pulmonary emboli involving bilateral main pulmonary arteries extending into lobar and segmental branches bilaterally  Acute left lower lobe infarct  Normocytic anemia  Hypertension      Patient admitted for further evaluation and treatment  Continue heparin drip with plan to transition to DOAC on discharge  Follow-up echocardiogram  Follow-up results of lower extremity venous duplex, if negative would pursue right upper extremity venous duplex  Monitor anemia transfuse to keep hemoglobin greater than 7  Continue home atorvastatin and Protonix  Continue home trazodone  Consult physical therapy occupational therapy secondary to patient's recent right shoulder surgery  Further inpatient orders recommendations pending clinical course       Discussed plan with bedside RN.    Disposition: Home pending further cardiovascular work-up.    DVT prophylaxis:  Medical DVT prophylaxis orders are present.    CODE STATUS:   Code Status (Patient has no pulse and is not breathing): CPR (Attempt to Resuscitate)  Medical Interventions (Patient has pulse or is breathing): Full Support

## 2023-08-30 NOTE — PLAN OF CARE
Goal Outcome Evaluation:     No acute events this shift. Pt had BLE doppler to try and determine where blood clot to lungs originated from. Awaiting IV team to come and place IV for pt to start receiving IV fluids. Pt is currently on a heparin drip and tolerating . Will continue on current plan of care.

## 2023-08-30 NOTE — ED PROVIDER NOTES
"Patient is 58 y.o. year old male that presents to the ED for evaluation of cough and shortness of breath.     Physical Exam    ED Course:    /87   Pulse 73   Temp 98.6 °F (37 °C) (Oral)   Resp 24   Ht 182.9 cm (72\")   Wt 90.7 kg (199 lb 15.3 oz)   SpO2 90%   BMI 27.12 kg/m²   Results for orders placed or performed during the hospital encounter of 08/29/23   Influenza Antigen, Rapid - Swab, Nasopharynx    Specimen: Nasopharynx; Swab   Result Value Ref Range    Influenza A Ag, EIA Negative Negative    Influenza B Ag, EIA Negative Negative   COVID-19,CEPHEID/JON,COR/OMI/PAD/SPENCER/MAD IN-HOUSE(OR EMERGENT/ADD-ON),NP SWAB IN TRANSPORT MEDIA 3-4 HR TAT, RT-PCR - Swab, Nasopharynx    Specimen: Nasopharynx; Swab   Result Value Ref Range    COVID19 Not Detected Not Detected - Ref. Range   Urinalysis With Microscopic If Indicated (No Culture) - Urine, Clean Catch    Specimen: Urine, Clean Catch   Result Value Ref Range    Color, UA Yellow Yellow, Straw    Appearance, UA Clear Clear    pH, UA 6.0 5.0 - 8.0    Specific Gravity, UA 1.021 1.005 - 1.030    Glucose, UA Negative Negative    Ketones, UA Negative Negative    Bilirubin, UA Negative Negative    Blood, UA Negative Negative    Protein, UA Trace (A) Negative    Leuk Esterase, UA Negative Negative    Nitrite, UA Negative Negative    Urobilinogen, UA 1.0 E.U./dL 0.2 - 1.0 E.U./dL   Comprehensive Metabolic Panel    Specimen: Blood   Result Value Ref Range    Glucose 136 (H) 65 - 99 mg/dL    BUN 9 6 - 20 mg/dL    Creatinine 1.16 0.76 - 1.27 mg/dL    Sodium 134 (L) 136 - 145 mmol/L    Potassium 4.3 3.5 - 5.2 mmol/L    Chloride 97 (L) 98 - 107 mmol/L    CO2 25.1 22.0 - 29.0 mmol/L    Calcium 8.7 8.6 - 10.5 mg/dL    Total Protein 6.2 6.0 - 8.5 g/dL    Albumin 3.2 (L) 3.5 - 5.2 g/dL    ALT (SGPT) 21 1 - 41 U/L    AST (SGOT) 21 1 - 40 U/L    Alkaline Phosphatase 142 (H) 39 - 117 U/L    Total Bilirubin 0.3 0.0 - 1.2 mg/dL    Globulin 3.0 gm/dL    A/G Ratio 1.1 g/dL    " BUN/Creatinine Ratio 7.8 7.0 - 25.0    Anion Gap 11.9 5.0 - 15.0 mmol/L    eGFR 73.0 >60.0 mL/min/1.73   Procalcitonin    Specimen: Blood   Result Value Ref Range    Procalcitonin 0.23 0.00 - 0.25 ng/mL   BNP    Specimen: Blood   Result Value Ref Range    proBNP 73.5 0.0 - 900.0 pg/mL   CBC Auto Differential    Specimen: Blood   Result Value Ref Range    WBC 8.45 3.40 - 10.80 10*3/mm3    RBC 4.15 4.14 - 5.80 10*6/mm3    Hemoglobin 12.3 (L) 13.0 - 17.7 g/dL    Hematocrit 36.0 (L) 37.5 - 51.0 %    MCV 86.7 79.0 - 97.0 fL    MCH 29.6 26.6 - 33.0 pg    MCHC 34.2 31.5 - 35.7 g/dL    RDW 12.8 12.3 - 15.4 %    RDW-SD 40.9 37.0 - 54.0 fl    MPV 11.2 6.0 - 12.0 fL    Platelets 164 140 - 450 10*3/mm3    Neutrophil % 78.8 (H) 42.7 - 76.0 %    Lymphocyte % 8.3 (L) 19.6 - 45.3 %    Monocyte % 9.1 5.0 - 12.0 %    Eosinophil % 2.8 0.3 - 6.2 %    Basophil % 0.4 0.0 - 1.5 %    Immature Grans % 0.6 (H) 0.0 - 0.5 %    Neutrophils, Absolute 6.66 1.70 - 7.00 10*3/mm3    Lymphocytes, Absolute 0.70 0.70 - 3.10 10*3/mm3    Monocytes, Absolute 0.77 0.10 - 0.90 10*3/mm3    Eosinophils, Absolute 0.24 0.00 - 0.40 10*3/mm3    Basophils, Absolute 0.03 0.00 - 0.20 10*3/mm3    Immature Grans, Absolute 0.05 0.00 - 0.05 10*3/mm3    nRBC 0.0 0.0 - 0.2 /100 WBC   ECG 12 Lead Dyspnea   Result Value Ref Range    QT Interval 394 ms    QTC Interval 446 ms   Green Top (Gel)   Result Value Ref Range    Extra Tube Hold for add-ons.    Lavender Top   Result Value Ref Range    Extra Tube hold for add-on    Gold Top - SST   Result Value Ref Range    Extra Tube Hold for add-ons.    Light Blue Top   Result Value Ref Range    Extra Tube Hold for add-ons.      Medications   sodium chloride 0.9 % flush 10 mL (has no administration in time range)   heparin 93529 units/250 mL (100 units/mL) in 0.45 % NaCl infusion (18 Units/kg/hr × 90.7 kg Intravenous Currently Infusing 8/29/23 2130)   heparin bolus from bag 4,500 Units (has no administration in time range)   heparin  bolus from bag 2,300 Units (has no administration in time range)   morphine injection 2 mg (has no administration in time range)   diphenhydrAMINE (BENADRYL) injection 25 mg (25 mg Intravenous Given 8/29/23 2047)   iopamidol (ISOVUE-370) 76 % injection 100 mL (93 mL Intravenous Given 8/29/23 2045)   magnesium citrate solution 150 mL (150 mL Oral Given 8/29/23 2114)   heparin bolus from bag 7,300 Units (7,300 Units Intravenous Bolus from Bag 8/29/23 2132)     CT Chest With Contrast Diagnostic    Result Date: 8/29/2023  Narrative: PROCEDURE: CT CHEST W CONTRAST DIAGNOSTIC  COMPARISON:  [facility], CT, CT ABDOMEN PELVIS W CONTRAST, 8/26/2023, 12:27. INDICATIONS: SHORTNESS OF BREATH AND COUGH, PNEUMONIA X 3 DAYS  TECHNIQUE: After obtaining the patient's consent, CT images were obtained with non-ionic intravenous contrast material.   PROTOCOL:   Pulmonary embolism imaging protocol performed    RADIATION:   DLP: 463.5 mGy*cm   Automated exposure control was utilized to minimize radiation dose. CONTRAST: 93 cc Isovue 370 I.V. LABS:   eGFR: 73 ml/min/1.73m2  FINDINGS:  There is bilateral acute pulmonary embolic disease.  There is thrombus within the right and left main pulmonary arteries extending into the lobar branches bilaterally.  There also defects within the segmental branches to the left lower lobe, the anterior segment of the right upper lobe, the apical posterior segment of the left upper lobe, in the anterior segment of the left upper lobe.  There is extensive airspace disease in the left lower lobe.  On the chest x-ray this is felt to be secondary to pneumonia.  This also could be due to pulmonary infarction.  There is a small-moderate left pleural effusion with fluid extending into the major fissure.  There is mild subsegmental atelectasis in the right lower lobe and lingula.  There is no definite evidence of right heart strain.  The heart size is normal.  There are faint coronary artery  atherosclerotic vascular calcifications.  There are no enlarged hilar or mediastinal lymph nodes.  There are no acute findings beneath the hemidiaphragms.  There are no suspicious osteolytic or sclerotic lesions within the bony structures.  There are degenerative changes within the visualized spine.      Impression:   1. Acute bilateral pulmonary embolic disease as discussed in detail above. 2. Extensive left lower lobe airspace disease.  This was felt to represent pneumonia on the chest x-ray.  Some of this could be due to pulmonary infarction. 3. Small to moderate sized left pleural effusion. 4. No definite evidence of right heart strain. 5. The abnormal results were discussed with Dr. Howe by telephone.     KEVEN GOMEZ MD       Electronically Signed and Approved By: KEVEN GOMEZ MD on 8/29/2023 at 21:04             CT Abdomen Pelvis With Contrast    Result Date: 8/26/2023  Narrative: PROCEDURE: CT ABDOMEN PELVIS W CONTRAST  COMPARISON: None  INDICATIONS: LUQ abdominal pain  TECHNIQUE: After obtaining the patient's consent, CT images were created with non-ionic intravenous contrast material.   PROTOCOL:   Standard imaging protocol performed    RADIATION:   DLP: 1007 mGy*cm   Automated exposure control was utilized to minimize radiation dose. CONTRAST: 100 cc Isovue 370 I.V. LABS:   eGFR: >60 ml/min/1.73m2  FINDINGS:  Lower chest:  Airspace disease in the left lower lobe that appears to enhance  Organs:  No hydronephrosis.  4 cm right renal cyst.  Left kidney unremarkable.  Tiny cyst in the left hepatic lobe.  Spleen, pancreas, adrenal glands, gallbladder unremarkable  GI tract:  No acute abnormality.  No intestinal distension or evident wall thickening.  Scattered colonic diverticula with no associated inflammation.  Normal appendix  Pelvis:  No abnormal fluid collection.  Urinary bladder unremarkable  Peritoneum/retroperitoneum:  No ascites or pneumoperitoneum.  Normal caliber aorta  No acute bony abnormality       Impression:    1. No acute abdominopelvic process demonstrated  2. Atelectasis or pneumonia in the left lower lobe     JOLIE MURPHY MD       Electronically Signed and Approved By: JOLIE MURPHY MD on 8/26/2023 at 12:50             XR Chest 1 View    Result Date: 8/29/2023  Narrative: PROCEDURE: XR CHEST 1 VW  COMPARISON: [facility], , XR CHEST 1 VW, 8/26/2023, 15:44.  INDICATIONS: Cough  FINDINGS:  There is worsening consolidation in the left lung base felt to represent pneumonia with a questionable small pleural effusion.  The right lung and pleural space are clear.  The heart size is normal.  The pulmonary vascular markings are normal.  There are chronic age-related changes involving the bony thorax.      Impression:  Worsening consolidation in the left lung base felt to represent pneumonia with a questionable small pleural effusion.       KEVEN GOMEZ MD       Electronically Signed and Approved By: KEVEN GOMEZ MD on 8/29/2023 at 17:41             XR Chest 1 View    Result Date: 8/26/2023  Narrative: PROCEDURE: XR CHEST 1 VW  COMPARISON: [facility], , XR CHEST 2 VW, 8/12/2021, 14:44.  INDICATIONS: SOB, Cough, persistent, cough  FINDINGS:  The heart mediastinal contours are within normal limits.  Perihilar and patchy opacity in the left base is compatible with pneumonia.  Minimal increased infrahilar opacities noted at the right base which is accentuated by relatively low lung volumes.  Findings may represent atelectasis or pneumonia.      Impression:   1. Bibasilar airspace disease concerning for left basilar and potentially bilateral pneumonia       MARIANNE MACHADO MD       Electronically Signed and Approved By: MARIANNE MACHADO MD on 8/26/2023 at 16:51             Peripheral Block    Result Date: 8/17/2023  Narrative: Finesse Springer MD     8/17/2023  7:08 AM Peripheral Block Patient reassessed immediately prior to procedure Patient location during procedure: pre-op Start  time: 8/17/2023 6:57 AM Stop time: 8/17/2023 7:05 AM Reason for block: at surgeon's request and post-op pain management Preanesthetic Checklist Completed: patient identified, IV checked, site marked, risks and benefits discussed, surgical consent, monitors and equipment checked, pre-op evaluation and timeout performed Prep: Pt Position: supine (HOB elevated) Sterile barriers:cap, washed/disinfected hands, sterile barriers, gloves, mask, partial drape and alcohol skin prep Prep: ChloraPrep Patient monitoring: blood pressure monitoring, continuous pulse oximetry and EKG Procedure Sedation: yes Performed under: local infiltration Guidance:ultrasound guided ULTRASOUND INTERPRETATION.  Using ultrasound guidance a 22 G gauge needle was placed in close proximity to the brachial plexus nerve, at which point, under ultrasound guidance anesthetic was injected in the area of the nerve and spread of the anesthesia was seen on ultrasound in close proximity thereto.  There were no abnormalities seen on ultrasound; a digital image was taken; and the patient tolerated the procedure with no complications. Images:still images obtained, printed/placed on chart Laterality:right Block Type:interscalene Injection Technique:single-shot Needle Type:echogenic Needle Gauge:22 G (2in) Resistance on Injection: none Medications Used: bupivacaine-EPINEPHrine PF (MARCAINE w/EPI) 0.5% -1:[id number] injection - Injection  30 mL - 8/17/2023 6:57:00 AM Post Assessment Injection Assessment: negative aspiration for heme, no paresthesia on injection and incremental injection Patient Tolerance:comfortable throughout block Complications:no Additional Notes The block or continuous infusion is requested by the referring physician for management of postoperative pain, or pain related to a procedure. Ultrasound guidance (deemed medically necessary). Painless injection, pt was awake and conversant during the procedure without complications. Needle and surrounding  structures visualized throughout procedure. No adverse reactions or complications seen during this period. Post-procedure image showed no signs of complication, and anatomy was consistent with an uncomplicated nerve blockade.     XR Abdomen KUB    Result Date: 8/29/2023  Narrative: PROCEDURE: XR ABDOMEN KUB  COMPARISON: None  INDICATIONS: ABDOMINAL PAIN; CONSTIPATION  FINDINGS:  The bowel gas pattern is normal.  The stool burden is also considered normal.  There are no significant intra-abdominal calcifications.  There are degenerative changes in the thoracolumbar spine.  There is abnormal density in the left lower chest consistent with pneumonia and a small pleural effusion.      Impression:   1. Unremarkable bowel gas pattern.  The stool burden is normal. 2. Left basilar pneumonia with a small pleural effusion.     KEVEN GOMEZ MD       Electronically Signed and Approved By: KEVEN GOMEZ MD on 8/29/2023 at 19:20              MDM: This is a 58-year-old male patient who presents for cough and shortness of breath.  He was treated for pneumonia however today chest x-ray reveals pleural effusion/conglomerates elevation with a normal procalcitonin level and normal WBC.  CT of the chest was ordered and reveals multiple bilateral pulmonary emboli with large clot burden.  Patient was therefore initiated on a heparin drip will be admitted to the hospital for further treatment.    Procedures      The case was discussed between the THAIS and myself. Patient  care including, but not limited to ordered imaging, medications, and lab results were reviewed. I then performed the substantive portion of the visit including all aspects of the medical decision making.        Chava Howe MD  22:21 EDT  08/29/23         Chava Howe MD  08/29/23 [id number]

## 2023-08-30 NOTE — PLAN OF CARE
Goal Outcome Evaluation:  Plan of Care Reviewed With: patient        Progress: no change  Outcome Evaluation: pt admitted during shift. pt is on heparin drip for PE. pt isnt theraputic, and being titrated on heparin. pt has c/o pain, meds given, see MAR.no other changes in pt condition this shift. will continue POC. Daysi Werner RN

## 2023-08-30 NOTE — H&P
[facility]   HISTORY AND PHYSICAL    Patient Name: Pavan Perales  : 1965  MRN: [id number]  Primary Care Physician:  Maite Montoya DO  Date of admission: 2023    Subjective   Subjective     Chief Complaint: Shortness of breath    History of Present Illness  Patient is a 58-year-old male with past medical history of hyperlipidemia, hypertension, insomnia, and PTSD.  Patient presented to the emergency department several days ago and was diagnosed with left sided pneumonia.  He was treated with oral antibiotics and discharged home.  Unfortunately the patient's condition did not improve and he continued to be very short of breath.  He said he started having increased coughing with left-sided chest pain with deep breaths.  He denied fever, chills, midsternal chest pain with radiation, nausea, vomiting, diaphoresis.  This time in the emergency department the patient was slightly hypoxic with O2 saturation of 90% on room air initially.  Because of this the ER provider obtained a CTA of the chest which demonstrated bilateral fairly extensive pulmonary emboli.  He also has left-sided infiltrates which the radiologist is unsure whether or not this is pneumonia versus complication from the pulmonary emboli.  Because of this however the ER physician felt that the patient should come in for further evaluation and treatment.  Review of Systems   Constitutional:  Positive for appetite change and fatigue.   Respiratory:  Positive for cough, chest tightness and shortness of breath.       Personal History     Past Medical History:   Diagnosis Date    Acid reflux     Back pain     Cancer     skin    Hyperlipidemia     Hypertension     Insomnia     PTSD (post-traumatic stress disorder)     no isssues when waking from anesthesia       Past Surgical History:   Procedure Laterality Date    BACK SURGERY      x2    COLONOSCOPY N/A 2023    Procedure: COLONOSCOPY WITH POLYPECTOMY;  Surgeon: Jose Hopson MD;   Location: [facility] ENDOSCOPY;  Service: General;  Laterality: N/A;  COLON POLYP, SUBOPTIMAL PREP    ENDOSCOPY N/A 01/03/2023    Procedure: ESOPHAGOGASTRODUODENOSCOPY WITH BIOPSIES, POLYPECTOMIES;  Surgeon: Jose Hopson MD;  Location: [facility] ENDOSCOPY;  Service: General;  Laterality: N/A;  HIATAL HERNIA, GASTRIC POLYPS    KNEE SURGERY Right     OTHER SURGICAL HISTORY      CANCER REMOVAL FACE    SHOULDER ARTHROSCOPY Right     SHOULDER ARTHROSCOPY W/ ROTATOR CUFF REPAIR Right 8/17/2023    Procedure: SHOULDER ARTHROSCOPY WITH SUBCROMIAL DECOMPRESSION AND DISTAL CLAVICULECTOMY, OPEN BICEPS TENODESIS;  Surgeon: Marek Gaviria MD;  [place] [facility] [place] [facility] [place]  Service: [place]opedics;  Laterality: Right;       Family History: family history is not on file. Otherwise pertinent FHx was reviewed and not pertinent to current issue.    Social History:  reports that he has never smoked. His smokeless tobacco use includes snuff. He reports current alcohol use. He reports that he does not currently use drugs.    Home Medications:  HYDROcodone-acetaminophen, UNKNOWN TO PATIENT, amLODIPine, azithromycin, cyclobenzaprine, pantoprazole, and traZODone    Allergies:  No Known Allergies    Objective    Objective     Vitals:   Temp:  [98.6 °F (37 °C)-99.3 °F (37.4 °C)] 98.6 °F (37 °C)  Heart Rate:  [71-89] 73  Resp:  [16-24] 24  BP: (122-146)/(79-87) 124/87    Physical Exam  Constitutional:       Appearance: Normal appearance.   HENT:      Head: Normocephalic and atraumatic.      Right Ear: External ear normal.      Left Ear: External ear normal.      Nose: Nose normal.   Eyes:      Pupils: Pupils are equal, round, and reactive to light.   Cardiovascular:      Rate and Rhythm: Normal rate and regular rhythm.   Pulmonary:      Effort: Pulmonary effort is normal.      Breath sounds: Normal breath sounds.   Abdominal:      General: Abdomen is flat.      Palpations: Abdomen is soft.   Musculoskeletal:         General: Normal range of motion.       Cervical back: Normal range of motion and neck supple.   Skin:     General: Skin is warm and dry.   Neurological:      Mental Status: He is alert and oriented to person, place, and time.       Result Review    Result Review:  I have personally reviewed the results from the time of this admission to 8/29/2023 21:29 EDT and agree with these findings:  [x]  Laboratory list / accordion  []  Microbiology  [x]  Radiology  []  EKG/Telemetry   []  Cardiology/Vascular   []  Pathology  []  Old records  []  Other:  Most notable findings include:   Narrative & Impression   PROCEDURE:  CT CHEST W CONTRAST DIAGNOSTIC     COMPARISON:  [facility], CT, CT ABDOMEN PELVIS W CONTRAST, 8/26/2023, 12:27.  INDICATIONS:  SHORTNESS OF BREATH AND COUGH, PNEUMONIA X 3 DAYS     TECHNIQUE:    After obtaining the patient's consent, CT images were obtained with non-ionic   intravenous contrast material.       PROTOCOL:     Pulmonary embolism imaging protocol performed                 RADIATION:      DLP: 463.5 mGy*cm               Automated exposure control was utilized to minimize radiation dose.   CONTRAST:      93 cc Isovue 370 I.V.  LABS:   eGFR: 73 ml/min/1.73m2     FINDINGS:          There is bilateral acute pulmonary embolic disease.  There is thrombus within the right and left   main pulmonary arteries extending into the lobar branches bilaterally.  There also defects within   the segmental branches to the left lower lobe, the anterior segment of the right upper lobe, the   apical posterior segment of the left upper lobe, in the anterior segment of the left upper lobe.    There is extensive airspace disease in the left lower lobe.  On the chest x-ray this is felt to be   secondary to pneumonia.  This also could be due to pulmonary infarction.  There is a small-moderate   left pleural effusion with fluid extending into the major fissure.  There is mild subsegmental   atelectasis in the right lower lobe and lingula.   There is no definite evidence of right heart   strain.  The heart size is normal.  There are faint coronary artery atherosclerotic vascular   calcifications.  There are no enlarged hilar or mediastinal lymph nodes.  There are no acute   findings beneath the hemidiaphragms.  There are no suspicious osteolytic or sclerotic lesions   within the bony structures.  There are degenerative changes within the visualized spine.     IMPRESSION:                 1. Acute bilateral pulmonary embolic disease as discussed in detail above.  2. Extensive left lower lobe airspace disease.  This was felt to represent pneumonia on the chest   x-ray.  Some of this could be due to pulmonary infarction.  3. Small to moderate sized left pleural effusion.  4. No definite evidence of right heart strain.  5. The abnormal results were discussed with Dr. Howe by telephone.            KEVEN GOMEZ MD              Assessment & Plan   Assessment / Plan     Brief Patient Summary:  Pavan Perales is a 58 y.o. male who presents with increasing shortness of breath.  He had been diagnosed with left lower lobe pneumonia several days ago and treated with oral antibiotics.  His symptoms did not improve.  Here in the ER he was found to have extensive acute bilateral pulmonary emboli with a left lower lobe infiltrate.  He will be admitted for further evaluation and treatment.    Active Hospital Problems:  1.  Acute bilateral pulmonary emboli  2.  Acute left lower lobe pneumonia  3.  Mild normocytic anemia  4.  Hypertension    Plan:   Patient will be admitted to the hospital service  Patient was started on heparin drip in the emergency department which we will continue on the floor  I will obtain a 2D echo to rule out left heart strain  We will obtain venous Dopplers of his lower extremities bilateral to rule out DVT  Patient will need conversion to DOAC  We will treat the patient's pneumonia with IV Rocephin and doxycycline  Consider pulmonary consult if  webster improvement is not seen      DVT prophylaxis:  Medical DVT prophylaxis orders are present.    CODE STATUS:    Code Status (Patient has no pulse and is not breathing): CPR (Attempt to Resuscitate)  Medical Interventions (Patient has pulse or is breathing): Full Support    Admission Status:  I believe this patient meets inpatient status.    Jose Faulkner, DO

## 2023-08-31 ENCOUNTER — APPOINTMENT (OUTPATIENT)
Dept: CARDIOLOGY | Facility: HOSPITAL | Age: 58
DRG: 176 | End: 2023-08-31
Payer: OTHER GOVERNMENT

## 2023-08-31 LAB
APTT PPP: 106.3 SECONDS (ref 78–95.9)
APTT PPP: 137.8 SECONDS (ref 78–95.9)
APTT PPP: 40 SECONDS (ref 78–95.9)
APTT PPP: 93.6 SECONDS (ref 78–95.9)
BASOPHILS # BLD AUTO: 0.04 10*3/MM3 (ref 0–0.2)
BASOPHILS NFR BLD AUTO: 0.7 % (ref 0–1.5)
BH CV UPPER VENOUS LEFT INTERNAL JUGULAR AUGMENT: NORMAL
BH CV UPPER VENOUS LEFT INTERNAL JUGULAR COMPRESS: NORMAL
BH CV UPPER VENOUS LEFT INTERNAL JUGULAR PHASIC: NORMAL
BH CV UPPER VENOUS LEFT INTERNAL JUGULAR SPONT: NORMAL
BH CV UPPER VENOUS LEFT SUBCLAVIAN AUGMENT: NORMAL
BH CV UPPER VENOUS LEFT SUBCLAVIAN COMPRESS: NORMAL
BH CV UPPER VENOUS LEFT SUBCLAVIAN PHASIC: NORMAL
BH CV UPPER VENOUS LEFT SUBCLAVIAN SPONT: NORMAL
BH CV UPPER VENOUS RIGHT AXILLARY AUGMENT: NORMAL
BH CV UPPER VENOUS RIGHT AXILLARY COMPRESS: NORMAL
BH CV UPPER VENOUS RIGHT AXILLARY PHASIC: NORMAL
BH CV UPPER VENOUS RIGHT AXILLARY SPONT: NORMAL
BH CV UPPER VENOUS RIGHT BASILIC FOREARM COMPRESS: NORMAL
BH CV UPPER VENOUS RIGHT BASILIC UPPER COMPRESS: NORMAL
BH CV UPPER VENOUS RIGHT BRACHIAL COMPRESS: NORMAL
BH CV UPPER VENOUS RIGHT CEPHALIC FOREARM COMPRESS: NORMAL
BH CV UPPER VENOUS RIGHT CEPHALIC UPPER COMPRESS: NORMAL
BH CV UPPER VENOUS RIGHT INTERNAL JUGULAR AUGMENT: NORMAL
BH CV UPPER VENOUS RIGHT INTERNAL JUGULAR COMPRESS: NORMAL
BH CV UPPER VENOUS RIGHT INTERNAL JUGULAR PHASIC: NORMAL
BH CV UPPER VENOUS RIGHT INTERNAL JUGULAR SPONT: NORMAL
BH CV UPPER VENOUS RIGHT MED CUBITAL COMPRESS: NORMAL
BH CV UPPER VENOUS RIGHT RADIAL COMPRESS: NORMAL
BH CV UPPER VENOUS RIGHT SUBCLAVIAN AUGMENT: NORMAL
BH CV UPPER VENOUS RIGHT SUBCLAVIAN COMPRESS: NORMAL
BH CV UPPER VENOUS RIGHT SUBCLAVIAN PHASIC: NORMAL
BH CV UPPER VENOUS RIGHT SUBCLAVIAN SPONT: NORMAL
BH CV UPPER VENOUS RIGHT ULNAR COMPRESS: NORMAL
DEPRECATED RDW RBC AUTO: 40.6 FL (ref 37–54)
EOSINOPHIL # BLD AUTO: 0.39 10*3/MM3 (ref 0–0.4)
EOSINOPHIL NFR BLD AUTO: 6.6 % (ref 0.3–6.2)
ERYTHROCYTE [DISTWIDTH] IN BLOOD BY AUTOMATED COUNT: 12.8 % (ref 12.3–15.4)
HCT VFR BLD AUTO: 37.4 % (ref 37.5–51)
HGB BLD-MCNC: 12.6 G/DL (ref 13–17.7)
IMM GRANULOCYTES # BLD AUTO: 0.03 10*3/MM3 (ref 0–0.05)
IMM GRANULOCYTES NFR BLD AUTO: 0.5 % (ref 0–0.5)
LYMPHOCYTES # BLD AUTO: 0.83 10*3/MM3 (ref 0.7–3.1)
LYMPHOCYTES NFR BLD AUTO: 13.9 % (ref 19.6–45.3)
MCH RBC QN AUTO: 29.2 PG (ref 26.6–33)
MCHC RBC AUTO-ENTMCNC: 33.7 G/DL (ref 31.5–35.7)
MCV RBC AUTO: 86.8 FL (ref 79–97)
MONOCYTES # BLD AUTO: 0.53 10*3/MM3 (ref 0.1–0.9)
MONOCYTES NFR BLD AUTO: 8.9 % (ref 5–12)
NEUTROPHILS NFR BLD AUTO: 4.13 10*3/MM3 (ref 1.7–7)
NEUTROPHILS NFR BLD AUTO: 69.4 % (ref 42.7–76)
NRBC BLD AUTO-RTO: 0 /100 WBC (ref 0–0.2)
PLATELET # BLD AUTO: 223 10*3/MM3 (ref 140–450)
PMV BLD AUTO: 8.8 FL (ref 6–12)
RBC # BLD AUTO: 4.31 10*6/MM3 (ref 4.14–5.8)
WBC NRBC COR # BLD: 5.95 10*3/MM3 (ref 3.4–10.8)

## 2023-08-31 PROCEDURE — 85730 THROMBOPLASTIN TIME PARTIAL: CPT | Performed by: FAMILY MEDICINE

## 2023-08-31 PROCEDURE — 97165 OT EVAL LOW COMPLEX 30 MIN: CPT

## 2023-08-31 PROCEDURE — 25010000002 HEPARIN (PORCINE) 25000-0.45 UT/250ML-% SOLUTION

## 2023-08-31 PROCEDURE — 93971 EXTREMITY STUDY: CPT | Performed by: SURGERY

## 2023-08-31 PROCEDURE — 85025 COMPLETE CBC W/AUTO DIFF WBC: CPT | Performed by: FAMILY MEDICINE

## 2023-08-31 PROCEDURE — 93306 TTE W/DOPPLER COMPLETE: CPT

## 2023-08-31 PROCEDURE — 97161 PT EVAL LOW COMPLEX 20 MIN: CPT

## 2023-08-31 PROCEDURE — 93971 EXTREMITY STUDY: CPT

## 2023-08-31 RX ADMIN — PANTOPRAZOLE 20 MG: 20 TABLET, DELAYED RELEASE ORAL at 21:23

## 2023-08-31 RX ADMIN — TRAZODONE HYDROCHLORIDE 300 MG: 100 TABLET ORAL at 21:23

## 2023-08-31 RX ADMIN — PANTOPRAZOLE 20 MG: 20 TABLET, DELAYED RELEASE ORAL at 09:34

## 2023-08-31 RX ADMIN — SODIUM CHLORIDE 75 ML/HR: 9 INJECTION, SOLUTION INTRAVENOUS at 12:05

## 2023-08-31 RX ADMIN — HEPARIN SODIUM 20 UNITS/KG/HR: 10000 INJECTION, SOLUTION INTRAVENOUS at 00:24

## 2023-08-31 RX ADMIN — ATORVASTATIN CALCIUM 20 MG: 20 TABLET, FILM COATED ORAL at 09:34

## 2023-08-31 NOTE — THERAPY EVALUATION
Acute Care - Physical Therapy Initial Evaluation   [place]     Patient Name: Pavan Perales  : 1965  MRN: [id number]  Today's Date: 2023      Visit Dx:     ICD-10-CM ICD-9-CM   1. Acute pulmonary embolism, unspecified pulmonary embolism type, unspecified whether acute cor pulmonale present  I26.99 415.19   2. Pneumonia due to infectious organism, unspecified laterality, unspecified part of lung  J18.9 486   3. Difficulty walking  R26.2 719.7   4. Decreased activities of daily living (ADL)  Z78.9 V49.89     Patient Active Problem List   Diagnosis    Arthritis    Cancer    Hypertension    GERD without esophagitis    Rotator cuff tear, right    Pulmonary embolism     Past Medical History:   Diagnosis Date    Acid reflux     Back pain     Cancer     skin    Hyperlipidemia     Hypertension     Insomnia     PTSD (post-traumatic stress disorder)     no isssues when waking from anesthesia     Past Surgical History:   Procedure Laterality Date    BACK SURGERY      x2    COLONOSCOPY N/A 2023    Procedure: COLONOSCOPY WITH POLYPECTOMY;  Surgeon: Jose Hopson MD;  Location: [facility] ENDOSCOPY;  Service: General;  Laterality: N/A;  COLON POLYP, SUBOPTIMAL PREP    ENDOSCOPY N/A 2023    Procedure: ESOPHAGOGASTRODUODENOSCOPY WITH BIOPSIES, POLYPECTOMIES;  Surgeon: Jose Hopson MD;  Location: [facility] ENDOSCOPY;  Service: General;  Laterality: N/A;  HIATAL HERNIA, GASTRIC POLYPS    KNEE SURGERY Right     OTHER SURGICAL HISTORY      CANCER REMOVAL FACE    SHOULDER ARTHROSCOPY Right     SHOULDER ARTHROSCOPY W/ ROTATOR CUFF REPAIR Right 2023    Procedure: SHOULDER ARTHROSCOPY WITH SUBCROMIAL DECOMPRESSION AND DISTAL CLAVICULECTOMY, OPEN BICEPS TENODESIS;  Surgeon: Marek Gaviria MD;  Location: [facility] OR [facility];  Service: Orthopedics;  Laterality: Right;     PT Assessment (last 12 hours)       PT Evaluation and Treatment       Row Name 23 1000          Physical Therapy Time and Intention     Subjective Information no complaints  -DP     Document Type evaluation  -DP     Mode of Treatment individual therapy;physical therapy  -DP     Patient Effort good  -DP     Symptoms Noted During/After Treatment none  -DP       Row Name 08/31/23 1000          General Information    Patient Profile Reviewed yes  -DP     Patient Observations alert;cooperative;agree to therapy  -DP     Prior Level of Function independent:;community mobility;gait;transfer;bed mobility;ADL's;home management;work;using stairs;driving  -DP     Equipment Currently Used at Home none  -DP     Existing Precautions/Restrictions no known precautions/restrictions  -DP     Barriers to Rehab none identified  -DP       Row Name 08/31/23 1000          Living Environment    Current Living Arrangements home  -DP     Home Accessibility stairs within home  -DP     People in Home spouse  -DP     Primary Care Provided by self  -DP       Row Name 08/31/23 1000          Stairs Within Home, Primary    Number of Stairs, Within Home, Primary seven  -DP       Row Name 08/31/23 1000          Home Use of Assistive/Adaptive Equipment    Equipment Currently Used at Home none  -DP       Row Name 08/31/23 1000          Cognition    Orientation Status (Cognition) oriented x 4  -DP       Row Name 08/31/23 1000          Range of Motion (ROM)    Range of Motion bilateral lower extremities;ROM is WFL  -DP       Row Name 08/31/23 1000          Strength (Manual Muscle Testing)    Strength (Manual Muscle Testing) bilateral lower extremities;strength is WFL  -DP       Row Name 08/31/23 1000          Transfers    Transfers sit-stand transfer;stand-sit transfer  -DP       Row Name 08/31/23 1000          Sit-Stand Transfer    Sit-Stand [place] (Transfers) independent  -DP     Assistive Device (Sit-Stand Transfers) other (see comments)  none  -DP       Row Name 08/31/23 1000          Stand-Sit Transfer    Stand-Sit [place] (Transfers) independent  -DP     Assistive Device  (Stand-Sit Transfers) other (see comments)  none  -DP       Row Name 08/31/23 1000          Gait/Stairs (Locomotion)    Gait/Stairs Locomotion gait/ambulation independence  -DP     [place] Level (Gait) independent  -DP     Assistive Device (Gait) other (see comments)  IV pole  -DP     Patient was able to Ambulate yes  -DP     Distance in Feet (Gait) 370  -DP       Row Name 08/31/23 1000          Balance    Balance Assessment standing dynamic balance  -DP     Dynamic Standing Balance independent  -DP     Position/Device Used, Standing Balance supported  IV pole  -DP       Row Name 08/31/23 1000          Plan of Care Review    Plan of Care Reviewed With patient  -DP     Progress no change  -DP     Outcome Evaluation Pt presents with independent transfers and ambulation without safety concerns. Further PT services are not indicated at this time.  -DP       Row Name 08/31/23 1055 08/31/23 1000       Therapy Assessment/Plan (PT)    Rehab Potential (PT) -- --  -DP    Criteria for Skilled Interventions Met (PT) -- no problems identified which require skilled intervention  -DP    Therapy Frequency (PT) --  -DP evaluation only  -DP    Predicted Duration of Therapy Intervention (PT) -- --  -DP      Row Name 08/31/23 1000          PT Evaluation Complexity    History, PT Evaluation Complexity no personal factors and/or comorbidities  -DP     Examination of Body Systems (PT Eval Complexity) total of 4 or more elements  -DP     Clinical Presentation (PT Evaluation Complexity) stable  -DP     Clinical Decision Making (PT Evaluation Complexity) low complexity  -DP     Overall Complexity (PT Evaluation Complexity) low complexity  -DP               User Key  (r) = Recorded By, (t) = Taken By, (c) = Cosigned By      Initials Name Provider Type    Andres Tomlinson, PT Physical Therapist                  PHYSICAL THERAPY GOALS/ PLAN    LTG 1:  Today:  Complete PT evaluation.  Treatment:  None  Time Frame/Durataion: 1  day  Outcome: Goal met      PT Recommendation and Plan  Anticipated Discharge Disposition (PT): home  Therapy Frequency (PT): evaluation only  Plan of Care Reviewed With: patient  Progress: no change  Outcome Evaluation: Pt presents with independent transfers and ambulation without safety concerns. Further PT services are not indicated at this time.   Outcome Measures       Row Name 08/31/23 1000             How much help from another person do you currently need...    Turning from your back to your side while in flat bed without using bedrails? 4  -DP      Moving from lying on back to sitting on the side of a flat bed without bedrails? 4  -DP      Moving to and from a bed to a chair (including a wheelchair)? 4  -DP      Standing up from a chair using your arms (e.g., wheelchair, bedside chair)? 4  -DP      Climbing 3-5 steps with a railing? 4  -DP      To walk in hospital room? 4  -DP      AM-PAC 6 Clicks Score (PT) 24  -DP         Functional Assessment    Outcome Measure Options AM-PAC 6 Clicks Basic Mobility (PT)  -DP                User Key  (r) = Recorded By, (t) = Taken By, (c) = Cosigned By      Initials Name Provider Type    Andres Tomlinson PT Physical Therapist                     Time Calculation:    PT Charges       Row Name 08/31/23 1101             Time Calculation    PT Received On 08/31/23  -DP      PT Goal Re-Cert Due Date --  -DP         Untimed Charges    PT Eval/Re-eval Minutes 40  -DP         Total Minutes    Untimed Charges Total Minutes 40  -DP       Total Minutes 40  -DP                User Key  (r) = Recorded By, (t) = Taken By, (c) = Cosigned By      Initials Name Provider Type    Andres Tomlinson PT Physical Therapist                  Therapy Charges for Today       Code Description Service Date Service Provider Modifiers Qty    25887596509  PT EVAL LOW COMPLEXITY 3 8/31/2023 Andres Rawls, PT GP 1            PT G-Codes  Outcome Measure Options: AM-PAC 6 Clicks Daily Activity  (OT), Optimal Instrument  AM-PAC 6 Clicks Score (PT): 24  AM-PAC 6 Clicks Score (OT): 19    Andres Rawls, PT  8/31/2023

## 2023-08-31 NOTE — THERAPY EVALUATION
Patient Name: Pavan Perales  : 1965    MRN: [id number]                              Today's Date: 2023       Admit Date: 2023    Visit Dx:     ICD-10-CM ICD-9-CM   1. Acute pulmonary embolism, unspecified pulmonary embolism type, unspecified whether acute cor pulmonale present  I26.99 415.19   2. Pneumonia due to infectious organism, unspecified laterality, unspecified part of lung  J18.9 486   3. Difficulty walking  R26.2 719.7   4. Decreased activities of daily living (ADL)  Z78.9 V49.89     Patient Active Problem List   Diagnosis    Arthritis    Cancer    Hypertension    GERD without esophagitis    Rotator cuff tear, right    Pulmonary embolism     Past Medical History:   Diagnosis Date    Acid reflux     Back pain     Cancer     skin    Hyperlipidemia     Hypertension     Insomnia     PTSD (post-traumatic stress disorder)     no isssues when waking from anesthesia     Past Surgical History:   Procedure Laterality Date    BACK SURGERY      x2    COLONOSCOPY N/A 2023    Procedure: COLONOSCOPY WITH POLYPECTOMY;  Surgeon: Jose Hopson MD;  Location: [facility] ENDOSCOPY;  Service: General;  Laterality: N/A;  COLON POLYP, SUBOPTIMAL PREP    ENDOSCOPY N/A 2023    Procedure: ESOPHAGOGASTRODUODENOSCOPY WITH BIOPSIES, POLYPECTOMIES;  Surgeon: Jose Hopson MD;  Location: [facility] ENDOSCOPY;  Service: General;  Laterality: N/A;  HIATAL HERNIA, GASTRIC POLYPS    KNEE SURGERY Right     OTHER SURGICAL HISTORY      CANCER REMOVAL FACE    SHOULDER ARTHROSCOPY Right     SHOULDER ARTHROSCOPY W/ ROTATOR CUFF REPAIR Right 2023    Procedure: SHOULDER ARTHROSCOPY WITH SUBCROMIAL DECOMPRESSION AND DISTAL CLAVICULECTOMY, OPEN BICEPS TENODESIS;  Surgeon: Marek Gaviria MD;  Location: [facility] OR [facility] [place];  Service: Orthopedics;  Laterality: Right;      General Information       Row Name 23 1109          OT Time and Intention    Document Type evaluation  -AV     Mode of Treatment individual  therapy;occupational therapy  -AV       Row Name 08/31/23 1109          General Information    Patient Profile Reviewed yes  -AV     Prior Level of Function independent:;ADL's;all household mobility;transfer  sits to bathe (grab bar). stands at sink to groom. standard commode. ambulates without device. no home O2.  -AV     Barriers to Rehab none identified  -AV       Row Name 08/31/23 1109          Occupational Profile    Reason for Services/Referral (Occupational Profile) Patient is a 58 year old male admitted to [facility] on 8/29/23 with shortness of air and cough. He is currently on 4th floor/ room air. OT consulted due to recent shoulder surgery with resultant functional limitations. No previous OT services for current condition (was receiving outpatient PT services prior to hospitalization).  -AV       Row Name 08/31/23 1109          Living Environment    People in Home spouse  -AV       Row Name 08/31/23 1109          Home Main Entrance    Number of Stairs, Main Entrance none  -AV       Row Name 08/31/23 1109          Cognition    Orientation Status (Cognition) --  alert, pleasant and cooperative. able to retain information and follow commands. flat affect.  -AV       Row Name 08/31/23 1109          Safety Issues, Functional Mobility    Impairments Affecting Function (Mobility) strength;coordination  -AV               User Key  (r) = Recorded By, (t) = Taken By, (c) = Cosigned By      Initials Name Provider Type    Vick Guaman, OT Occupational Therapist                     Mobility/ADL's       Row Name 08/31/23 1113          Transfers    Comment, (Transfers) supervision  -AV       Row Name 08/31/23 1113          Activities of Daily Living    BADL Assessment/Intervention --  Independent feeding with setup. Min assist grooming with setup. CGA/min assist bathing and dressing. Independent use of urinal/ supervision further toilet hygiene (ambulates to bathroom vs BSC use).  -AV               User  Key  (r) = Recorded By, (t) = Taken By, (c) = Cosigned By      Initials Name Provider Type    Vick Guaman OT Occupational Therapist                   Obj/Interventions       Row Name 08/31/23 1114          Sensory Assessment (Somatosensory)    Sensory Assessment (Somatosensory) UE sensation intact  -AV       [facility] Name 08/31/23 1114          Vision Assessment/Intervention    Visual Impairment/Limitations WFL;corrective lenses for reading  -AV     Vision Assessment Comment limited eye contact noted  -AV       Row Name 08/31/23 1114          Range of Motion Comprehensive    General Range of Motion upper extremity range of motion deficits identified  -AV     Comment, General Range of Motion LUE AROM WFL. RUE AROM WFL for: hand including opposition, wrist, elbow (with moderate effort displayed). R shoulder active flexion <45. (surgery on 8/17/23)  -AV       Row Name 08/31/23 1114          Strength Comprehensive (MMT)    Comment, General Manual Muscle Testing (MMT) Assessment LUE 5/5. R  4(-)/5 with further MMT deferred.  -AV       Row Name 08/31/23 1114          Motor Skills    Motor Skills coordination;functional endurance  -AV     Coordination --  right dominant- diminished since surgery per pt report  -AV     Functional Endurance WFL  -AV       Row Name 08/31/23 1114          Balance    Comment, Balance supervision  -AV               User Key  (r) = Recorded By, (t) = Taken By, (c) = Cosigned By      Initials Name Provider Type    Vick Guaman OT Occupational Therapist                   Goals/Plan       Row Name 08/31/23 1119          Transfer Goal 1 (OT)    Activity/Assistive Device (Transfer Goal 1, OT) transfers, all  -AV     [place] Level/Cues Needed (Transfer Goal 1, OT) independent  -AV     Time Frame (Transfer Goal 1, OT) long term goal (LTG);10 days  -AV       Row Name 08/31/23 1119          Bathing Goal 1 (OT)    Activity/Device (Bathing Goal 1, OT) bathing skills, all  -AV      [place] Level/Cues Needed (Bathing Goal 1, OT) modified independence  -AV     Time Frame (Bathing Goal 1, OT) long term goal (LTG);10 days  -AV       Row Name 08/31/23 1119          Dressing Goal 1 (OT)    Activity/Device (Dressing Goal 1, OT) dressing skills, all  -AV     [place]/Cues Needed (Dressing Goal 1, OT) modified independence  -AV     Time Frame (Dressing Goal 1, OT) long term goal (LTG);10 days  -AV       Row Name 08/31/23 1119          Toileting Goal 1 (OT)    Activity/Device (Toileting Goal 1, OT) toileting skills, all  -AV     [place] Level/Cues Needed (Toileting Goal 1, OT) modified independence  -AV     Time Frame (Toileting Goal 1, OT) long term goal (LTG);10 days  -AV       Row Name 08/31/23 1119          Grooming Goal 1 (OT)    Activity/Device (Grooming Goal 1, OT) grooming skills, all  -AV     [place] (Grooming Goal 1, OT) modified independence  -AV     Time Frame (Grooming Goal 1, OT) long term goal (LTG);10 days  -AV       Row Name 08/31/23 1119          ROM Goal 1 (OT)    ROM Goal 1 (OT) Patient will demonstrate 45 AROM right shoulder to increase ADL independence.  -AV     Time Frame (ROM Goal 1, OT) long term goal (LTG);10 days  -AV       Row Name 08/31/23 1119          Strength Goal 1 (OT)    Strength Goal 1 (OT) Patient will demonstrate 4/5 right  to increase ADL independence.  -AV     Time Frame (Strength Goal 1, OT) long term goal (LTG);10 days  -AV       Row Name 08/31/23 1119          Problem Specific Goal 1 (OT)    Problem Specific Goal 1 (OT) Patient will manipulate clothing fasteners independently.  -AV     Time Frame (Problem Specific Goal 1, OT) long term goal (LTG);10 days  -AV       Row Name 08/31/23 1119          Therapy Assessment/Plan (OT)    Planned Therapy Interventions (OT) BADL retraining;occupation/activity based interventions;patient/caregiver education/training;ROM/therapeutic exercise;transfer/mobility retraining;neuromuscular  control/coordination retraining  -AV               User Key  (r) = Recorded By, (t) = Taken By, (c) = Cosigned By      Initials Name Provider Type    Vick Guaman OT Occupational Therapist                   Clinical Impression       Row Name 08/31/23 1117          Pain Assessment    Additional Documentation Pain Scale: FACES Pre/Post-Treatment (Group)  -AV       Row Name 08/31/23 1117          Pain Scale: FACES Pre/Post-Treatment    Pain: FACES Scale, Pretreatment 4-->hurts little more  -AV     Posttreatment Pain Rating 4-->hurts little more  -AV     Pain Location - Side/Orientation Right  -AV     Pain Location - shoulder  -AV       Row Name 08/31/23 1117          Plan of Care Review    Plan of Care Reviewed With patient  -AV     Progress no change  first session: evaluation  -AV     Outcome Evaluation Patient presents with limitations of RUE ROM, strength and coordination which are impacting ADL independence. Skilled OT is indicated to remediate/compensate for deficits to maximize independence and safety with functional tasks.  -AV       Row Name 08/31/23 1117          Therapy Assessment/Plan (OT)    Patient/Family Therapy Goal Statement (OT) none stated  -AV     Rehab Potential (OT) good, to achieve stated therapy goals  -AV     Criteria for Skilled Therapeutic Interventions Met (OT) yes;meets criteria;skilled treatment is necessary  -AV     Therapy Frequency (OT) 5 times/wk  -AV       Row Name 08/31/23 1117          Therapy Plan Review/Discharge Plan (OT)    Anticipated Discharge Disposition (OT) home with outpatient therapy services;home with assist  -AV       Row Name 08/31/23 1117          Vital Signs    O2 Delivery Pre Treatment room air  -AV     O2 Delivery Intra Treatment room air  -AV     O2 Delivery Post Treatment room air  -AV               User Key  (r) = Recorded By, (t) = Taken By, (c) = Cosigned By      Initials Name Provider Type    Vick Guaman OT Occupational Therapist                    Outcome Measures       Row Name 08/31/23 1121          How much help from another is currently needed...    Putting on and taking off regular lower body clothing? 3  -AV     Bathing (including washing, rinsing, and drying) 3  -AV     Toileting (which includes using toilet bed pan or urinal) 3  -AV     Putting on and taking off regular upper body clothing 3  -AV     Taking care of personal grooming (such as brushing teeth) 3  -AV     Eating meals 4  -AV     AM-PAC 6 Clicks Score (OT) 19  -AV       Row Name 08/31/23 1000 08/31/23 0701       How much help from another person do you currently need...    Turning from your back to your side while in flat bed without using bedrails? 4  -DP 4  -AG    Moving from lying on back to sitting on the side of a flat bed without bedrails? 4  -DP 4  -AG    Moving to and from a bed to a chair (including a wheelchair)? 4  -DP 4  -AG    Standing up from a chair using your arms (e.g., wheelchair, bedside chair)? 4  -DP 4  -AG    Climbing 3-5 steps with a railing? 4  -DP 4  -AG    To walk in hospital room? 4  -DP 4  -AG    AM-PAC 6 Clicks Score (PT) 24  -DP 24  -AG    Highest level of mobility 8 --> Walked 250 feet or more  -DP 8 --> Walked 250 feet or more  -AG      Row Name 08/31/23 1121 08/31/23 1000       Functional Assessment    Outcome Measure Options AM-PAC 6 Clicks Daily Activity (OT);Optimal Instrument  -AV AM-PAC 6 Clicks Basic Mobility (PT)  -DP      Row Name 08/31/23 1121          Optimal Instrument    Optimal Instrument Optimal - 3  -AV     Bending/Stooping 1  -AV     Standing 1  -AV     Reaching 3  -AV     From the list, choose the 3 activities you would most like to be able to do without any difficulty Bending/stooping;Standing;Reaching  -AV     Total Score Optimal - 3 5  -AV               User Key  (r) = Recorded By, (t) = Taken By, (c) = Cosigned By      Initials Name Provider Type    AV Vick Ruiz, OT Occupational Therapist    Andres Tomlinson, PT Physical  Therapist    Manpreet Morales, RN Registered Nurse                    Occupational Therapy Education       Title: PT OT SLP Therapies (In Progress)       Topic: Occupational Therapy (In Progress)       Point: ADL training (Done)       Description:   Instruct learner(s) on proper safety adaptation and remediation techniques during self care or transfers.   Instruct in proper use of assistive devices.                  Learning Progress Summary             Patient Acceptance, E, VU by AV at 8/31/2023 1122                         Point: Home exercise program (Done)       Description:   Instruct learner(s) on appropriate technique for monitoring, assisting and/or progressing therapeutic exercises/activities.                  Learning Progress Summary             Patient Acceptance, E, VU by AV at 8/31/2023 1122                         Point: Precautions (Not Started)       Description:   Instruct learner(s) on prescribed precautions during self-care and functional transfers.                  Learner Progress:  Not documented in this visit.              Point: Body mechanics (Not Started)       Description:   Instruct learner(s) on proper positioning and spine alignment during self-care, functional mobility activities and/or exercises.                  Learner Progress:  Not documented in this visit.                              User Key       Initials Effective Dates Name Provider Type Discipline     06/16/21 -  Vick Ruiz OT Occupational Therapist OT                  OT Recommendation and Plan  Planned Therapy Interventions (OT): BADL retraining, occupation/activity based interventions, patient/caregiver education/training, ROM/therapeutic exercise, transfer/mobility retraining, neuromuscular control/coordination retraining  Therapy Frequency (OT): 5 times/wk  Plan of Care Review  Plan of Care Reviewed With: patient  Progress: no change (first session: evaluation)  Outcome Evaluation: Patient presents with  limitations of RUE ROM, strength and coordination which are impacting ADL independence. Skilled OT is indicated to remediate/compensate for deficits to maximize independence and safety with functional tasks.     Time Calculation:   Evaluation Complexity (OT)  Review Occupational Profile/Medical/Therapy History Complexity: expanded/moderate complexity  Assessment, Occupational Performance/Identification of Deficit Complexity: 1-3 performance deficits  Clinical Decision Making Complexity (OT): problem focused assessment/low complexity  Overall Complexity of Evaluation (OT): low complexity     Time Calculation- OT       Row Name 08/31/23 1123             Time Calculation- OT    OT Received On 08/31/23  -AV      OT Goal Re-Cert Due Date 09/09/23  -AV         Untimed Charges    OT Eval/Re-eval Minutes 35  -AV         Total Minutes    Untimed Charges Total Minutes 35  -AV       Total Minutes 35  -AV                User Key  (r) = Recorded By, (t) = Taken By, (c) = Cosigned By      Initials Name Provider Type    AV Vick Ruiz OT Occupational Therapist                  Therapy Charges for Today       Code Description Service Date Service Provider Modifiers Qty    [id number] HC OT EVAL LOW COMPLEXITY 3 8/31/2023 Vick Ruiz OT GO 1                 Vick Ruiz OT  8/31/2023

## 2023-08-31 NOTE — PROGRESS NOTES
[facility]   Hospitalist Progress Note  Date: 2023  Patient Name: Pavan Perales  : 1965  MRN: [id number]  Date of admission: 2023      Subjective   Subjective     Chief Complaint: Follow-up shortness of breath    Summary:Pavan Perales is a 58 y.o. male with past medical history of hyperlipidemia, hypertension, insomnia, and PTSD.  Patient presented to the emergency department several days ago and was diagnosed with left sided pneumonia.  He was treated with oral antibiotics and discharged home.  Unfortunately the patient's condition did not improve and he continued to be very short of breath.  He said he started having increased coughing with left-sided chest pain with deep breaths.  He denied fever, chills, midsternal chest pain with radiation, nausea, vomiting, diaphoresis.  This time in the emergency department the patient was slightly hypoxic with O2 saturation of 90% on room air initially.  Because of this the ER provider obtained a CTA of the chest which demonstrated bilateral fairly extensive pulmonary emboli.  He also has left-sided infiltrates which the radiologist is unsure whether or not this is pneumonia versus complication from the pulmonary emboli.  Because of this however the ER physician felt that the patient should come in for further evaluation and treatment.  Patient admitted started on heparin drip.  Pro-Jatin within normal limits.  Findings on chest CT thought secondary to pulmonary infarct and not pneumonia.  Antibiotics stopped.    Interval Followup: Patient sitting up in bedside chair resting comfortably with family.  Patient indicates some swelling in his right upper extremity.  Denies lower extremity swelling.  Afebrile overnight.  Sinus rhythm 70s to 90s on telemetry review.  Blood pressure within normal limits.  Satting well on room air.  Echocardiogram read and right upper extremity venous plexus read pending.  Lower extremity venous duplex negative for DVT.   No other issues per nursing.    Review of Systems  Constitutional: Negative for fatigue and fever.   HENT: Negative for sore throat and trouble swallowing.    Eyes: Negative for pain and discharge.   Respiratory: Positive for cough and shortness of breath.    Cardiovascular: Negative for chest pain and palpitations.   Gastrointestinal: Negative for abdominal pain, nausea and vomiting.   Endocrine: Negative for cold intolerance and heat intolerance.   Genitourinary: Negative for difficulty urinating and dysuria.   Musculoskeletal: Negative for back pain and neck stiffness.  Right shoulder pain  Skin: Negative for color change and rash.   Neurological: Negative for syncope and headaches.   Hematological: Negative for adenopathy.   Psychiatric/Behavioral: Negative for confusion and hallucinations.    Objective   Objective     Vitals:   Temp:  [97.9 °F (36.6 °C)-98.3 °F (36.8 °C)] 97.9 °F (36.6 °C)  Heart Rate:  [63-73] 72  Resp:  [18] 18  BP: (132-158)/(68-83) 132/82  Physical Exam   General: well-developed appearing stated age in no acute distress  HEENT: Normocephalic atraumatic moist membranes pupils equal round reactive light, no scleral icterus no conjunctival injection  Cardiovascular: regular rate and rhythm no murmurs rubs or gallops S1-S2, no lower extremity edema appreciated  Pulmonary: Clear to auscultation bilaterally crackles left lower lung field no wheezes or rhonchi symmetric chest expansion, unlabored, no conversational dyspnea appreciated  Gastrointestinal: Soft nontender nondistended positive bowel sounds all 4 quadrants no rebound or guarding  Musculoskeletal: No clubbing cyanosis, warm and well-perfused, calves soft symmetric nontender bilaterally, decreased range of motion right upper extremity at the shoulder  Skin: Clean dry without rashes  Neuro: Cranial nerves II through XII intact grossly no sensorimotor deficits appreciated bilateral upper and lower extremities  Psych: Patient is calm  cooperative and appropriate with exam not responding to internal stimuli  : No Stone catheter no bladder distention no suprapubic tenderness    Result Review    Result Review:  I have personally reviewed these results and agree with these findings:  [x]  Laboratory  LAB RESULTS:      Lab 08/31/23  1600 08/31/23  0644 08/31/23  0353 08/30/23  2122 08/30/23  1438 08/30/23  0816 08/30/23  0134 08/29/23  2322 08/29/23  2208 08/29/23  1630 08/26/23  1128   WBC  --   --  5.95  --   --   --  7.14  --   --  8.45 7.80   HEMOGLOBIN  --   --  12.6*  --   --   --  12.8*  --   --  12.3* 14.3   HEMATOCRIT  --   --  37.4*  --   --   --  37.2*  --   --  36.0* 43.3   PLATELETS  --   --  223  --   --   --  185  --   --  164 142   NEUTROS ABS  --   --  4.13  --   --   --  5.25  --   --  6.66 6.01   IMMATURE GRANS (ABS)  --   --  0.03  --   --   --  0.05  --   --  0.05 0.03   LYMPHS ABS  --   --  0.83  --   --   --  0.85  --   --  0.70 0.69*   MONOS ABS  --   --  0.53  --   --   --  0.62  --   --  0.77 0.85   EOS ABS  --   --  0.39  --   --   --  0.32  --   --  0.24 0.18   MCV  --   --  86.8  --   --   --  86.3  --   --  86.7 86.9   PROCALCITONIN  --   --   --   --   --   --   --   --   --  0.23  --    LACTATE  --   --   --   --   --   --   --   --   --   --  1.9   PROTIME  --   --   --   --   --   --   --   --  16.2*  --   --    APTT 93.6 40.0* 137.8* 109.9* 76.9*   < > 37.2*   < > >200.0*  --   --     < > = values in this interval not displayed.           Lab 08/30/23  0134 08/29/23  1630 08/26/23  1128   SODIUM 134* 134* 135*   POTASSIUM 4.2 4.3 3.6   CHLORIDE 100 97* 101   CO2 24.4 25.1 24.1   ANION GAP 9.6 11.9 9.9   BUN 10 9 14   CREATININE 1.00 1.16 1.05   EGFR 87.2 73.0 82.3   GLUCOSE 121* 136* 137*   CALCIUM 8.9 8.7 8.7           Lab 08/29/23  1630 08/26/23  1128   TOTAL PROTEIN 6.2 6.3   ALBUMIN 3.2* 3.7   GLOBULIN 3.0 2.6   ALT (SGPT) 21 28   AST (SGOT) 21 18   BILIRUBIN 0.3 0.6   ALK PHOS 142* 104   LIPASE  --  31            Lab 08/29/23 2208 08/29/23  1630   PROBNP  --  73.5   HSTROP T <6  --    PROTIME 16.2*  --    INR 1.30*  --                    Brief Urine Lab Results  (Last result in the past 365 days)        Color   Clarity   Blood   Leuk Est   Nitrite   Protein   CREAT   Urine HCG        08/29/23 1644 Yellow   Clear   Negative   Negative   Negative   Trace                 Microbiology Results (last 10 days)       Procedure Component Value - Date/Time    Blood Culture - Blood, Hand, Left [[id number]]  (Normal) Collected: 08/29/23 2322    Lab Status: Preliminary result Specimen: Blood from Hand, Left Updated: 08/30/23 2330     Blood Culture No growth at 24 hours    Blood Culture - Blood, Hand, Left [[id number]]  (Normal) Collected: 08/29/23 2322    Lab Status: Preliminary result Specimen: Blood from Hand, Left Updated: 08/30/23 2330     Blood Culture No growth at 24 hours    COVID PRE-OP / PRE-PROCEDURE SCREENING ORDER (NO ISOLATION) - Swab, Nasopharynx [[id number]]  (Normal) Collected: 08/29/23 1647    Lab Status: Final result Specimen: Swab from Nasopharynx Updated: 08/29/23 1735    Narrative:      The following orders were created for panel order COVID PRE-OP / PRE-PROCEDURE SCREENING ORDER (NO ISOLATION) - Swab, Nasopharynx.  Procedure                               Abnormality         Status                     ---------                               -----------         ------                     COVID-19,CEPHEID/JON,CO...[[id number]]  Normal              Final result                 Please view results for these tests on the individual orders.    Influenza Antigen, Rapid - Swab, Nasopharynx [[id number]]  (Normal) Collected: 08/29/23 1647    Lab Status: Final result Specimen: Swab from Nasopharynx Updated: 08/29/23 1721     Influenza A Ag, EIA Negative     Influenza B Ag, EIA Negative    COVID-19,CEPHEID/JON,COR/OMI/PAD/SPENCER/MAD IN-HOUSE(OR EMERGENT/ADD-ON),NP SWAB IN TRANSPORT MEDIA 3-4 HR TAT, RT-PCR - Swab, Nasopharynx  [[id number]]  (Normal) Collected: 08/29/23 1647    Lab Status: Final result Specimen: Swab from Nasopharynx Updated: 08/29/23 1735     COVID19 Not Detected    Narrative:      Fact sheet for providers: https://www.fda.gov/media/537279/download     Fact sheet for patients: https://www.fda.gov/media/747762/download  Fact sheet for providers: https://www.fda.gov/media/787984/download     Fact sheet for patients: https://www.fda.gov/media/632432/download            [x]  Microbiology  [x]  Radiology  CT Chest With Contrast Diagnostic    Result Date: 8/29/2023    1. Acute bilateral pulmonary embolic disease as discussed in detail above. 2. Extensive left lower lobe airspace disease.  This was felt to represent pneumonia on the chest x-ray.  Some of this could be due to pulmonary infarction. 3. Small to moderate sized left pleural effusion. 4. No definite evidence of right heart strain. 5. The abnormal results were discussed with Dr. Howe by telephone.     KEVEN GOMEZ MD       Electronically Signed and Approved By: KEVEN GOMEZ MD on 8/29/2023 at 21:04             CT Abdomen Pelvis With Contrast    Result Date: 8/26/2023     1. No acute abdominopelvic process demonstrated  2. Atelectasis or pneumonia in the left lower lobe     JOLIE MURPHY MD       Electronically Signed and Approved By: JOLIE MURPHY MD on 8/26/2023 at 12:50             XR Chest 1 View    Result Date: 8/29/2023   Worsening consolidation in the left lung base felt to represent pneumonia with a questionable small pleural effusion.       KEVEN GOMEZ MD       Electronically Signed and Approved By: KEVEN GOMEZ MD on 8/29/2023 at 17:41             XR Chest 1 View    Result Date: 8/26/2023    1. Bibasilar airspace disease concerning for left basilar and potentially bilateral pneumonia       MARIANNE MACHADO MD       Electronically Signed and Approved By: MARIANNE MACHADO MD on 8/26/2023 at 16:51             XR Abdomen KUB    Result Date: 8/29/2023    1. Unremarkable  bowel gas pattern.  The stool burden is normal. 2. Left basilar pneumonia with a small pleural effusion.     KEVEN GOMEZ MD       Electronically Signed and Approved By: KEVEN GOMEZ MD on 8/29/2023 at 19:20              [x]  EKG/Telemetry   []  Cardiology/Vascular   []  Pathology  []  Old records  [x]  Other:  Scheduled Meds:atorvastatin, 20 mg, Oral, Daily  pantoprazole, 20 mg, Oral, BID  senna-docusate sodium, 2 tablet, Oral, BID  sodium chloride, 10 mL, Intravenous, Q12H  traZODone, 300 mg, Oral, Nightly      Continuous Infusions:heparin, 18 Units/kg/hr, Last Rate: 20 Units/kg/hr (08/31/23 0932)  sodium chloride, 75 mL/hr, Last Rate: 75 mL/hr (08/31/23 1205)      PRN Meds:.  acetaminophen    senna-docusate sodium **AND** polyethylene glycol **AND** bisacodyl **AND** bisacodyl    heparin    heparin    HYDROcodone-acetaminophen    HYDROcodone-acetaminophen    HYDROmorphone    nitroglycerin    ondansetron    sodium chloride    sodium chloride    sodium chloride      Assessment & Plan   Assessment / Plan     Assessment/Plan:  Acute bilateral pulmonary emboli involving bilateral main pulmonary arteries extending into lobar and segmental branches bilaterally  Acute left lower lobe infarct  Normocytic anemia  Hypertension      Patient admitted for further evaluation and treatment  Continue heparin drip with plan to transition to DOAC on discharge  Follow-up echocardiogram  Follow-up results of right upper extremity venous duplex  Monitor anemia transfuse to keep hemoglobin greater than 7  Continue home atorvastatin and Protonix  Continue home trazodone  Consult physical therapy occupational therapy secondary to patient's recent right shoulder surgery  Further inpatient orders recommendations pending clinical course       Discussed plan with bedside RN.    Disposition: Home pending echocardiogram and right upper extremity venous duplex.    DVT prophylaxis:  Medical DVT prophylaxis orders are present.    CODE STATUS:   Code  Status (Patient has no pulse and is not breathing): CPR (Attempt to Resuscitate)  Medical Interventions (Patient has pulse or is breathing): Full Support

## 2023-08-31 NOTE — PLAN OF CARE
Goal Outcome Evaluation:  Plan of Care Reviewed With: patient        Progress: no change  Outcome Evaluation: Pt presents with independent transfers and ambulation without safety concerns. Further PT services are not indicated at this time.      Anticipated Discharge Disposition (PT): home

## 2023-08-31 NOTE — PLAN OF CARE
Goal Outcome Evaluation:           Progress: no change  Outcome Evaluation: Pt remains on heparin gtt, awaiting PTT this AM. PTT therputic X1. No acute overnight events.

## 2023-08-31 NOTE — PLAN OF CARE
Goal Outcome Evaluation:  Plan of Care Reviewed With: patient        Progress: no change (first session: evaluation)  Outcome Evaluation: Patient presents with limitations of RUE ROM, strength and coordination which are impacting ADL independence. Skilled OT is indicated to remediate/compensate for deficits to maximize independence and safety with functional tasks.      Anticipated Discharge Disposition (OT): home with outpatient therapy services, home with assist

## 2023-08-31 NOTE — PLAN OF CARE
Patient has been up resting today and has been walking around the hallways. C/o shoulder pain, pt states that pain is not bad enough for pain medication. Heparin gtt continuing. Rate is at 20 units/kg/hour.     Manpreet Vick RN

## 2023-09-01 ENCOUNTER — READMISSION MANAGEMENT (OUTPATIENT)
Dept: CALL CENTER | Facility: HOSPITAL | Age: 58
End: 2023-09-01
Payer: OTHER GOVERNMENT

## 2023-09-01 VITALS
TEMPERATURE: 98.2 F | BODY MASS INDEX: 27.08 KG/M2 | HEIGHT: 72 IN | SYSTOLIC BLOOD PRESSURE: 128 MMHG | HEART RATE: 68 BPM | DIASTOLIC BLOOD PRESSURE: 85 MMHG | WEIGHT: 199.96 LBS | RESPIRATION RATE: 20 BRPM | OXYGEN SATURATION: 98 %

## 2023-09-01 LAB
BASOPHILS # BLD AUTO: 0.04 10*3/MM3 (ref 0–0.2)
BASOPHILS NFR BLD AUTO: 0.7 % (ref 0–1.5)
DEPRECATED RDW RBC AUTO: 40.4 FL (ref 37–54)
EOSINOPHIL # BLD AUTO: 0.42 10*3/MM3 (ref 0–0.4)
EOSINOPHIL NFR BLD AUTO: 7 % (ref 0.3–6.2)
ERYTHROCYTE [DISTWIDTH] IN BLOOD BY AUTOMATED COUNT: 12.6 % (ref 12.3–15.4)
HCT VFR BLD AUTO: 36 % (ref 37.5–51)
HGB BLD-MCNC: 12.3 G/DL (ref 13–17.7)
IMM GRANULOCYTES # BLD AUTO: 0.03 10*3/MM3 (ref 0–0.05)
IMM GRANULOCYTES NFR BLD AUTO: 0.5 % (ref 0–0.5)
LYMPHOCYTES # BLD AUTO: 0.82 10*3/MM3 (ref 0.7–3.1)
LYMPHOCYTES NFR BLD AUTO: 13.6 % (ref 19.6–45.3)
MCH RBC QN AUTO: 29.8 PG (ref 26.6–33)
MCHC RBC AUTO-ENTMCNC: 34.2 G/DL (ref 31.5–35.7)
MCV RBC AUTO: 87.2 FL (ref 79–97)
MONOCYTES # BLD AUTO: 0.47 10*3/MM3 (ref 0.1–0.9)
MONOCYTES NFR BLD AUTO: 7.8 % (ref 5–12)
NEUTROPHILS NFR BLD AUTO: 4.26 10*3/MM3 (ref 1.7–7)
NEUTROPHILS NFR BLD AUTO: 70.4 % (ref 42.7–76)
NRBC BLD AUTO-RTO: 0 /100 WBC (ref 0–0.2)
PLATELET # BLD AUTO: 239 10*3/MM3 (ref 140–450)
PMV BLD AUTO: 9.2 FL (ref 6–12)
RBC # BLD AUTO: 4.13 10*6/MM3 (ref 4.14–5.8)
WBC NRBC COR # BLD: 6.04 10*3/MM3 (ref 3.4–10.8)

## 2023-09-01 PROCEDURE — 85025 COMPLETE CBC W/AUTO DIFF WBC: CPT | Performed by: FAMILY MEDICINE

## 2023-09-01 PROCEDURE — 25010000002 HEPARIN (PORCINE) 25000-0.45 UT/250ML-% SOLUTION

## 2023-09-01 RX ADMIN — Medication 10 ML: at 09:00

## 2023-09-01 RX ADMIN — HEPARIN SODIUM 18 UNITS/KG/HR: 10000 INJECTION, SOLUTION INTRAVENOUS at 09:47

## 2023-09-01 RX ADMIN — HYDROCODONE BITARTRATE AND ACETAMINOPHEN 1 TABLET: 10; 325 TABLET ORAL at 11:50

## 2023-09-01 RX ADMIN — ACETAMINOPHEN 650 MG: 325 TABLET ORAL at 11:00

## 2023-09-01 RX ADMIN — APIXABAN 10 MG: 5 TABLET, FILM COATED ORAL at 11:00

## 2023-09-01 RX ADMIN — PANTOPRAZOLE 20 MG: 20 TABLET, DELAYED RELEASE ORAL at 08:14

## 2023-09-01 RX ADMIN — ATORVASTATIN CALCIUM 20 MG: 20 TABLET, FILM COATED ORAL at 08:15

## 2023-09-01 NOTE — PLAN OF CARE
Goal Outcome Evaluation:  Plan of Care Reviewed With: patient      Alert and oriented x 4. NSR. VSS. Discontinued heparin drip. Patient now taking eliquis. Patient complained of a migraine. PRN medications given for pain. Anticipating discharge.

## 2023-09-01 NOTE — DISCHARGE SUMMARY
[facility]         HOSPITALIST  DISCHARGE SUMMARY    Patient Name: Pavan Perales  : 1965  MRN: [id number]    Date of Admission: 2023  Date of Discharge: 2023  Primary Care Physician: Maite Montoya DO    Consults       Date and Time Order Name Status Description    2023  9:03 PM Inpatient Hospitalist Consult              Active and Resolved Hospital Problems:  Acute bilateral pulmonary emboli involving bilateral main pulmonary arteries extending into lobar and segmental branches bilaterally.  Provoked due to recent shoulder surgery  Acute left lower lobe infarct due to pulmonary emboli  Normocytic anemia  Hypertension  Active Hospital Problems    Diagnosis POA    **Pulmonary embolism [I26.99] Yes      Resolved Hospital Problems   No resolved problems to display.       Hospital Course     Hospital Course:  Pavan Perales is a 58 y.o. male  with past medical history of hyperlipidemia, hypertension, insomnia, and PTSD.  Patient presented to the emergency department several days ago and was diagnosed with left sided pneumonia.  He was treated with oral antibiotics and discharged home.  Unfortunately the patient's condition did not improve and he continued to be very short of breath.  He said he started having increased coughing with left-sided chest pain with deep breaths.  He denied fever, chills, midsternal chest pain with radiation, nausea, vomiting, diaphoresis.  This time in the emergency department the patient was slightly hypoxic with O2 saturation of 90% on room air initially.  Because of this the ER provider obtained a CTA of the chest which demonstrated bilateral fairly extensive pulmonary emboli.  He also has left-sided infiltrates which the radiologist is unsure whether or not this is pneumonia versus complication from the pulmonary emboli.  Because of this however the ER physician felt that the patient should come in for further evaluation and treatment.   Patient admitted started on heparin drip.  Pro-Jatin within normal limits.  Findings on chest CT thought secondary to pulmonary infarct and not pneumonia.  Antibiotics stopped.  Lower extremity venous duplex is negative for clot.  Right upper extremity venous duplex also negative for clot.  Right heart not seen well on echocardiogram.  PCP will need to follow-up on formal echocardiogram read.  Patient satting well on room air.  Transitioned from heparin drip to Eliquis.  Patient seen and evaluated on date of discharge and thought stable for discharge home to follow-up with his primary care provider in 1 to 2 weeks.  Recommend at least 3 months of full anticoagulation as this seems to be a provoked pulmonary embolism due to recent shoulder surgery        DISCHARGE Follow Up Recommendations for labs and diagnostics: Formal echocardiogram read      Day of Discharge     Vital Signs:  Temp:  [97.7 °F (36.5 °C)-98.6 °F (37 °C)] 97.7 °F (36.5 °C)  Heart Rate:  [66-94] 94  Resp:  [18] 18  BP: (132-156)/(60-98) 140/98  Physical Exam:   Gen. well-developed appearing stated age in no acute distress  HEENT: Normocephalic atraumatic moist membranes pupils equal round reactive light, no scleral icterus no conjunctival injection  Cardiovascular: regular rate and rhythm no murmurs rubs or gallops S1-S2, no lower extremity edema appreciated  Pulmonary: Clear to auscultation bilaterally no wheezes rales or rhonchi symmetric chest expansion, unlabored, no conversational dyspnea appreciated  Gastrointestinal: Soft nontender nondistended positive bowel sounds all 4 quadrants no rebound or guarding  Musculoskeletal: No clubbing cyanosis, warm and well-perfused, calves soft symmetric nontender bilaterally, right shoulder surgical incision well approximated without erythema or drainage, mild swelling of the right upper extremity  Skin: Clean dry without rashs  Neuro: Cranial nerves II through XII intact grossly no sensorimotor deficits  appreciated bilateral upper and lower extremities  Psych: Patient is calm cooperative and appropriate with exam not responding to internal stimuli  : No Stone catheter no bladder distention no suprapubic tenderness      Discharge Details        Discharge Medications        New Medications        Instructions Start Date   apixaban 5 MG tablet tablet  Commonly known as: ELIQUIS   Take 2 tablets by mouth 2 (Two) Times a Day for 7 days, THEN 1 tablet 2 (Two) Times a Day for 23 days.   Start Date: September 1, 2023            Changes to Medications        Instructions Start Date   pantoprazole 40 MG EC tablet  Commonly known as: Protonix  What changed:   how much to take  when to take this   40 mg, Oral, Daily             Continue These Medications        Instructions Start Date   atorvastatin 20 MG tablet  Commonly known as: LIPITOR   20 mg, Oral, Daily      HYDROcodone-acetaminophen  MG per tablet  Commonly known as: NORCO   1 tablet, Oral, Every 6 Hours PRN      traZODone 100 MG tablet  Commonly known as: DESYREL   300 mg, Oral, Nightly             Stop These Medications      azithromycin 250 MG tablet  Commonly known as: Zithromax Z-Al              No Known Allergies    Discharge Disposition:  Home or Self Care    Diet:  Hospital:  Diet Order   Procedures    Diet: Cardiac Diets; Healthy Heart (2-3 Na+); Texture: Regular Texture (IDDSI 7); Fluid Consistency: Thin (IDDSI 0)       Discharge Activity:   Activity Instructions       Gradually Increase Activity Until at Pre-Hospitalization Level              CODE STATUS:  Code Status and Medical Interventions:   Ordered at: 08/29/23 2128     Code Status (Patient has no pulse and is not breathing):    CPR (Attempt to Resuscitate)     Medical Interventions (Patient has pulse or is breathing):    Full Support         Future Appointments   Date Time Provider Department Center   9/5/2023 11:30 AM Ashlee Orantes PTA [facility]   9/8/2023 11:30 AM Ashlee Orantes PTA  [facility]   9/12/2023 11:30 AM DelgadoAsaen, PT [facility]   9/15/2023 10:30 AM Ashlee Orantes, PTA [facility]   9/19/2023 10:30 AM Ashlee Orantes, PTA [facility]   9/22/2023 10:30 AM Mary Jo Delgado, PT [facility]   9/26/2023 10:30 AM Mary Jo Delgado, PT [facility]   9/29/2023 10:30 AM Ashlee Orantes, PTA [facility]   10/2/2023  8:00 AM Ann Jiménez PA-C [facility]       Additional Instructions for the Follow-ups that You Need to Schedule       Discharge Follow-up with PCP   As directed       Currently Documented PCP:    Maite Montoya DO    PCP Phone Number:    [phone]     Follow Up Details: Hospital discharge follow up 1-2 weeks Bilateral Pulmonary emboli                Pertinent  and/or Most Recent Results     PROCEDURES:   None    LAB RESULTS:      Lab 09/01/23  0434 08/31/23  2232 08/31/23  1600 08/31/23  0644 08/31/23  0353 08/30/23  2122 08/30/23  0816 08/30/23  0134 08/29/23  2322 08/29/23  2208 08/29/23  1630 08/26/23  1128   WBC 6.04  --   --   --  5.95  --   --  7.14  --   --  8.45 7.80   HEMOGLOBIN 12.3*  --   --   --  12.6*  --   --  12.8*  --   --  12.3* 14.3   HEMATOCRIT 36.0*  --   --   --  37.4*  --   --  37.2*  --   --  36.0* 43.3   PLATELETS 239  --   --   --  223  --   --  185  --   --  164 142   NEUTROS ABS 4.26  --   --   --  4.13  --   --  5.25  --   --  6.66 6.01   IMMATURE GRANS (ABS) 0.03  --   --   --  0.03  --   --  0.05  --   --  0.05 0.03   LYMPHS ABS 0.82  --   --   --  0.83  --   --  0.85  --   --  0.70 0.69*   MONOS ABS 0.47  --   --   --  0.53  --   --  0.62  --   --  0.77 0.85   EOS ABS 0.42*  --   --   --  0.39  --   --  0.32  --   --  0.24 0.18   MCV 87.2  --   --   --  86.8  --   --  86.3  --   --  86.7 86.9   PROCALCITONIN  --   --   --   --   --   --   --   --   --   --  0.23  --    LACTATE  --   --   --   --   --   --   --   --   --   --   --  1.9   PROTIME  --   --   --   --   --   --   --   --   --  16.2*  --   --    APTT   --  106.3* 93.6 40.0* 137.8* 109.9*   < > 37.2*   < > >200.0*  --   --     < > = values in this interval not displayed.         Lab 08/30/23  0134 08/29/23  1630 08/26/23  1128   SODIUM 134* 134* 135*   POTASSIUM 4.2 4.3 3.6   CHLORIDE 100 97* 101   CO2 24.4 25.1 24.1   ANION GAP 9.6 11.9 9.9   BUN 10 9 14   CREATININE 1.00 1.16 1.05   EGFR 87.2 73.0 82.3   GLUCOSE 121* 136* 137*   CALCIUM 8.9 8.7 8.7         Lab 08/29/23  1630 08/26/23  1128   TOTAL PROTEIN 6.2 6.3   ALBUMIN 3.2* 3.7   GLOBULIN 3.0 2.6   ALT (SGPT) 21 28   AST (SGOT) 21 18   BILIRUBIN 0.3 0.6   ALK PHOS 142* 104   LIPASE  --  31         Lab 08/29/23  2208 08/29/23  1630   PROBNP  --  73.5   HSTROP T <6  --    PROTIME 16.2*  --    INR 1.30*  --                  Brief Urine Lab Results  (Last result in the past 365 days)        Color   Clarity   Blood   Leuk Est   Nitrite   Protein   CREAT   Urine HCG        08/29/23 1644 Yellow   Clear   Negative   Negative   Negative   Trace                 Microbiology Results (last 10 days)       Procedure Component Value - Date/Time    Blood Culture - Blood, Hand, Left [[id number]]  (Normal) Collected: 08/29/23 2322    Lab Status: Preliminary result Specimen: Blood from Hand, Left Updated: 08/31/23 2330     Blood Culture No growth at 2 days    Blood Culture - Blood, Hand, Left [[id number]]  (Normal) Collected: 08/29/23 2322    Lab Status: Preliminary result Specimen: Blood from Hand, Left Updated: 08/31/23 2330     Blood Culture No growth at 2 days    COVID PRE-OP / PRE-PROCEDURE SCREENING ORDER (NO ISOLATION) - Swab, Nasopharynx [[id number]]  (Normal) Collected: 08/29/23 1647    Lab Status: Final result Specimen: Swab from Nasopharynx Updated: 08/29/23 2320    Narrative:      The following orders were created for panel order COVID PRE-OP / PRE-PROCEDURE SCREENING ORDER (NO ISOLATION) - Swab, Nasopharynx.  Procedure                               Abnormality         Status                     ---------                                -----------         ------                     COVID-19,CEPHEID/JON,CO...[[id number]]  Normal              Final result                 Please view results for these tests on the individual orders.    Influenza Antigen, Rapid - Swab, Nasopharynx [[id number]]  (Normal) Collected: 08/29/23 1647    Lab Status: Final result Specimen: Swab from Nasopharynx Updated: 08/29/23 1721     Influenza A Ag, EIA Negative     Influenza B Ag, EIA Negative    COVID-19,CEPHEID/JON,COR/OMI/PAD/[facility] IN-HOUSE(OR EMERGENT/ADD-ON),NP SWAB IN TRANSPORT MEDIA 3-4 HR TAT, RT-PCR - Swab, Nasopharynx [[id number]]  (Normal) Collected: 08/29/23 1647    Lab Status: Final result Specimen: Swab from Nasopharynx Updated: 08/29/23 1735     COVID19 Not Detected    Narrative:      Fact sheet for providers: https://www.fda.gov/media/819227/download     Fact sheet for patients: https://www.fda.gov/media/256703/download  Fact sheet for providers: https://www.fda.gov/media/538097/download     Fact sheet for patients: https://www.fda.gov/media/090476/download            CT Chest With Contrast Diagnostic    Result Date: 8/29/2023  Impression:   1. Acute bilateral pulmonary embolic disease as discussed in detail above. 2. Extensive left lower lobe airspace disease.  This was felt to represent pneumonia on the chest x-ray.  Some of this could be due to pulmonary infarction. 3. Small to moderate sized left pleural effusion. 4. No definite evidence of right heart strain. 5. The abnormal results were discussed with Dr. Howe by telephone.     KEVEN GOMEZ MD       Electronically Signed and Approved By: KEVEN GOMEZ MD on 8/29/2023 at 21:04             CT Abdomen Pelvis With Contrast    Result Date: 8/26/2023  Impression:    1. No acute abdominopelvic process demonstrated  2. Atelectasis or pneumonia in the left lower lobe     JOLIE MRUPHY MD       Electronically Signed and Approved By: JOLIE MURPHY MD on 8/26/2023 at 12:50             XR Chest 1  View    Result Date: 8/29/2023  Impression:  Worsening consolidation in the left lung base felt to represent pneumonia with a questionable small pleural effusion.       KEVEN GOMEZ MD       Electronically Signed and Approved By: KEVEN GOMEZ MD on 8/29/2023 at 17:41             XR Chest 1 View    Result Date: 8/26/2023  Impression:   1. Bibasilar airspace disease concerning for left basilar and potentially bilateral pneumonia       MARIANNE MACHADO MD       Electronically Signed and Approved By: MARIANNE MACHADO MD on 8/26/2023 at 16:51             XR Abdomen KUB    Result Date: 8/29/2023  Impression:   1. Unremarkable bowel gas pattern.  The stool burden is normal. 2. Left basilar pneumonia with a small pleural effusion.     KEVEN GOMEZ MD       Electronically Signed and Approved By: KEVEN GOMEZ MD on 8/29/2023 at 19:20              Results for orders placed during the hospital encounter of 08/29/23    Duplex Venous Upper Extremity - Right CV-READ    Interpretation Summary    Suboptimal axillary vein compression images.  No obvious thrombus identified.    All other right sided vessels appear normal.      Results for orders placed during the hospital encounter of 08/29/23    Duplex Venous Upper Extremity - Right CV-READ    Interpretation Summary    Suboptimal axillary vein compression images.  No obvious thrombus identified.    All other right sided vessels appear normal.          Labs Pending at Discharge:  Pending Labs       Order Current Status    Blood Culture - Blood, Hand, Left Preliminary result    Blood Culture - Blood, Hand, Left Preliminary result              Time spent on Discharge including face to face service: Greater than 35 minutes    Electronically signed by Jairo Stein MD, 09/01/23, 10:51 AM EDT.

## 2023-09-01 NOTE — PLAN OF CARE
Goal Outcome Evaluation:           Progress: improving  Outcome Evaluation: Pt theraputic on heparin PTT. Venous doppler of RUE negative. No acute overnight events.

## 2023-09-01 NOTE — OUTREACH NOTE
Prep Survey      Flowsheet Row Responses   [facility] patient discharged from? [place]   Is LACE score < 7 ? No   Eligibility [facility] [place]   Date of Admission 08/29/23   Date of Discharge 09/01/23   Discharge Disposition Home or Self Care   Discharge diagnosis Acute bilateral pulmonary emboli involving bilateral main pulmonary arteries extending into lobar and segmental branches bilaterally.  Provoked due to recent shoulder surgery   Does the patient have one of the following disease processes/diagnoses(primary or secondary)? Other   Does the patient have Home health ordered? No   Is there a DME ordered? No   Prep survey completed? Yes            RICHIE SILVA - Registered Nurse

## 2023-09-02 LAB
BH CV ECHO MEAS - ACS: 3.6 CM
BH CV ECHO MEAS - AO MEAN PG: 3 MMHG
BH CV ECHO MEAS - AO ROOT DIAM: 3.2 CM
BH CV ECHO MEAS - AO V2 VTI: 26 CM
BH CV ECHO MEAS - AVA(I,D): 2.6 CM2
BH CV ECHO MEAS - EDV(CUBED): 117.6 ML
BH CV ECHO MEAS - EDV(MOD-SP2): 75.5 ML
BH CV ECHO MEAS - EDV(MOD-SP4): 67.6 ML
BH CV ECHO MEAS - EF(MOD-BP): 57.6 %
BH CV ECHO MEAS - EF(MOD-SP2): 62.3 %
BH CV ECHO MEAS - EF(MOD-SP4): 54.7 %
BH CV ECHO MEAS - ESV(CUBED): 27 ML
BH CV ECHO MEAS - ESV(MOD-SP2): 28.5 ML
BH CV ECHO MEAS - ESV(MOD-SP4): 30.6 ML
BH CV ECHO MEAS - FS: 38.8 %
BH CV ECHO MEAS - IVS/LVPW: 0.91 CM
BH CV ECHO MEAS - IVSD: 1 CM
BH CV ECHO MEAS - LA DIMENSION: 3.5 CM
BH CV ECHO MEAS - LAT PEAK E' VEL: 17.8 CM/SEC
BH CV ECHO MEAS - LV DIASTOLIC VOL/BSA (35-75): 31.8 CM2
BH CV ECHO MEAS - LV MASS(C)D: 188.1 GRAMS
BH CV ECHO MEAS - LV MAX PG: 4.4 MMHG
BH CV ECHO MEAS - LV MEAN PG: 2 MMHG
BH CV ECHO MEAS - LV SYSTOLIC VOL/BSA (12-30): 14.4 CM2
BH CV ECHO MEAS - LV V1 MAX: 105 CM/SEC
BH CV ECHO MEAS - LV V1 VTI: 21.6 CM
BH CV ECHO MEAS - LVIDD: 4.9 CM
BH CV ECHO MEAS - LVIDS: 3 CM
BH CV ECHO MEAS - LVOT AREA: 3.1 CM2
BH CV ECHO MEAS - LVOT DIAM: 2 CM
BH CV ECHO MEAS - LVPWD: 1.1 CM
BH CV ECHO MEAS - MED PEAK E' VEL: 12.5 CM/SEC
BH CV ECHO MEAS - MV A MAX VEL: 74.8 CM/SEC
BH CV ECHO MEAS - MV DEC TIME: 0.23 MSEC
BH CV ECHO MEAS - MV E MAX VEL: 66 CM/SEC
BH CV ECHO MEAS - MV E/A: 0.88
BH CV ECHO MEAS - RVDD: 2.1 CM
BH CV ECHO MEAS - SI(MOD-SP2): 22.1 ML/M2
BH CV ECHO MEAS - SI(MOD-SP4): 17.4 ML/M2
BH CV ECHO MEAS - SV(LVOT): 67.9 ML
BH CV ECHO MEAS - SV(MOD-SP2): 47 ML
BH CV ECHO MEAS - SV(MOD-SP4): 37 ML
BH CV ECHO MEAS - TAPSE (>1.6): 2.24 CM
BH CV ECHO MEASUREMENTS AVERAGE E/E' RATIO: 4.36
LEFT ATRIUM VOLUME INDEX: 19.3 ML/M2

## 2023-09-03 LAB
BACTERIA SPEC AEROBE CULT: NORMAL
BACTERIA SPEC AEROBE CULT: NORMAL

## 2023-09-04 ENCOUNTER — TRANSITIONAL CARE MANAGEMENT TELEPHONE ENCOUNTER (OUTPATIENT)
Dept: CALL CENTER | Facility: HOSPITAL | Age: 58
End: 2023-09-04
Payer: OTHER GOVERNMENT

## 2023-09-04 NOTE — OUTREACH NOTE
Call Center TCM Note      Flowsheet Row Responses   [facility] patient discharged from? [place]   Does the patient have one of the following disease processes/diagnoses(primary or secondary)? Other   TCM attempt successful? Yes   Call start time 1533   Call end time 1542   Discharge diagnosis Acute bilateral pulmonary emboli involving bilateral main pulmonary arteries extending into lobar and segmental branches bilaterally.  Provoked due to recent shoulder surgery   Person spoke with today (if not patient) and relationship wife   Meds reviewed with patient/caregiver? Yes   Is the patient having any side effects they believe may be caused by any medication additions or changes? No   Does the patient have all medications ordered at discharge? Yes   Is the patient taking all medications as directed (includes completed medication regime)? Yes   Comments Patient has a hospital followup scheduled on 9/11/2023 with Dr. Montoya.   Does the patient have an appointment with their PCP within 7-14 days of discharge? Yes   Psychosocial issues? No   Did the patient receive a copy of their discharge instructions? Yes   Nursing interventions Reviewed instructions with patient   What is the patient's perception of their health status since discharge? Improving   Is the patient/caregiver able to teach back signs and symptoms related to disease process for when to call PCP? Yes   Is the patient/caregiver able to teach back signs and symptoms related to disease process for when to call 911? Yes   Is the patient/caregiver able to teach back the hierarchy of who to call/visit for symptoms/problems? PCP, Specialist, Home health nurse, Urgent Care, ED, 911 Yes   If the patient is a current smoker, are they able to teach back resources for cessation? Not a smoker   TCM call completed? Yes   Wrap up additional comments Patient is doing better. No needs at this time.   Call end time 1542   Would this patient benefit from a Referral to Amb  Social Work? No   Is the patient interested in additional calls from an ambulatory ? No            Roddy Vivas RN    9/4/2023, 15:42 EDT

## 2023-09-05 ENCOUNTER — TREATMENT (OUTPATIENT)
Dept: PHYSICAL THERAPY | Facility: CLINIC | Age: 58
End: 2023-09-05
Payer: OTHER GOVERNMENT

## 2023-09-05 DIAGNOSIS — R29.898 SHOULDER WEAKNESS: ICD-10-CM

## 2023-09-05 DIAGNOSIS — M25.511 ACUTE PAIN OF RIGHT SHOULDER: ICD-10-CM

## 2023-09-05 DIAGNOSIS — Z98.890 S/P ARTHROSCOPY OF RIGHT SHOULDER: Primary | ICD-10-CM

## 2023-09-05 NOTE — PROGRESS NOTES
Re-Assessment / Re-Certification  [address], [address] [place] [address]      Patient: Pavan Perales   : 1965  Diagnosis/ICD-10 Code:  S/P arthroscopy of right shoulder [Z98.890]  Referring practitioner: Marek Gaviria MD  Date of Initial Visit: No linked episodes  Today's Date: 2023  Patient seen for Visit count could not be calculated. Make sure you are using a visit which is associated with an episode. sessions    Progress note due: 10/5/2023  Re-cert due: 2023      Subjective:   Pavan Perales reports: mild R shoulder pain at beginning of session today.  Last week pt was hospitalized for acute bilateral pulmonary emboli involving bilateral main pulmonary arteries extending into lobar and segmental branches bilaterally.  Pt reports that he is currently on Eliquis.  Pt reports that at worst R shoulder pain has been a 5/10 the past several days.  Pt states that he has been cleared to return to work and is starting today.  Pt is currently not wearing his sling into session today.    Subjective Questionnaire: UEFS: 1380  Clinical Progress: improved  Treatment has included: therapeutic exercise, manual therapy, and therapeutic activity    Subjective   Objective   Additional Active Range of Motion Details  R shoulder AROM/strength not tested per sx precautions     Passive Range of Motion      Right Shoulder   Flexion: 133 degrees  Abduction: 131 degrees   External rotation 90°: 75 degrees   Internal rotation 90°: 75 degrees     Assessment & Plan       Assessment  Impairments: abnormal or restricted ROM, activity intolerance, impaired physical strength, lacks appropriate home exercise program and pain with function   Functional limitations: carrying objects, lifting, sleeping, pulling, pushing, uncomfortable because of pain, reaching behind back, reaching overhead and unable to perform repetitive tasks   Assessment details: Re-assessment completed on pt today due to recent hospitalization  for acute bilateral pulmonary emboli involving bilateral main pulmonary arteries extending into lobar and segmental branches bilaterally.  Pt is currently on Eliquis and has been cleared to return to PT.  Pt demonstrates improvements in R shoulder ROM but continues to have deficits in this as well as R shoulder and bilateral scapular weakness and reports of pain limiting function during ADLs as shown on the UEFS.  Pt educated again on sx precautions on need to follow these in order to avoid re-injury.  Patient would benefit from skilled PT services in order to address deficits limiting function at this time.     Goals  Plan Goals: 1. The patient has limited ROM of the R shoulder.    LTG 1: 12 weeks:  The patient will demonstrate 150 degrees of R shoulder flexion, 150 degrees of shoulder abduction, 60 degrees of shoulder external rotation, and  shoulder internal rotation to T10 to allow the patient to reach into upper kitchen cabinets and manipulate clothing behind the back with greater ease.    STATUS:  ongoing   STG 1a: 6 weeks:  The patient will demonstrate 120 degrees of R shoulder flexion, 120 degrees of shoulder abduction, 45 degrees of shoulder external rotation, and shoulder internal rotation to L2.    STATUS:  ongoing    2. The patient has limited strength of the R shoulder.   LTG 2: 12 weeks:  The patient will demonstrate 4+ /5 strength for R shoulder flexion, abduction, external rotation, and internal rotation in order to demonstrate improved shoulder stability.    STATUS:  ongoing   STG 2a: 6 weeks:  The patient will demonstrate 4-/5 strength for R shoulder flexion, abduction, external rotation, and internal rotation.    STATUS:  ongoing   STG2b:  4 weeks:  The patient will be independent with home exercises.     STATUS:  ongoing     3. The patient complains of pain to the R shoulder.   LTG 3: 12 weeks:  The patient will report a pain rating of 2 /10 or better in order to improve sleep quality and  tolerance to performance of activities of daily living.    STATUS:  ongoing   STG 3a: 6 weeks:  The patient will report a pain rating of 4 /10 or better.     STATUS:  ongoing    4. Carrying, Moving, and Handling Objects Functional Limitation     LTG 4: 12 weeks:  The patient will demonstrate 1-19 % limitation by achieving a score of 65 on the UEFS.    STATUS:  New             STG 4: 12 weeks:  The patient will demonstrate 44 % limitation by achieving a score of 45 on the UEFS.    STATUS:  ongoing         Plan  Therapy options: will be seen for skilled therapy services  Planned modality interventions: cryotherapy, TENS and thermotherapy (hydrocollator packs)  Planned therapy interventions: manual therapy, ADL retraining, neuromuscular re-education, body mechanics training, postural training, soft tissue mobilization, flexibility, spinal/joint mobilization, functional ROM exercises, strengthening, stretching, home exercise program, therapeutic activities, IADL retraining and joint mobilization  Frequency: 2x week  Duration in weeks: 12  Treatment plan discussed with: patient    Progress toward previous goals: Not Met        Recommendations: Continue as planned  Timeframe: 3 months  Prognosis to achieve goals: good    PT SIGNATURE: Mary Jo Delgado, PT, DPT    Electronically signed 9/5/2023  DATE TREATMENT INITIATED: 9/5/2023  [place] License: [id number]     Certification Period: 9/5/2023 thru 12/3/2023    Based upon review of the patient's progress and continued therapy plan, it is my medical opinion that Pavan Perales should continue physical therapy treatment at [facility]  [address] [place] [id number]  [phone].    Signature: __________________________________  Marek Gaviria MD  NPI: [id number]    Timed:  Manual Therapy:    18     mins  38631;  Therapeutic Exercise:    20     mins  66296;     Neuromuscular Flower:    0    mins  68319;    Therapeutic Activity:      0     mins  87787;     Gait Trainin     mins  37130;     Ultrasound:     0     mins  70784;    Electrical Stimulation:    0     mins  16368 ( );    Untimed:  Electrical Stimulation:    0     mins  38405 ( );  Mechanical Traction:    0     mins  19425;     Timed Treatment:   38   mins   Total Treatment:     43   mins

## 2023-09-08 ENCOUNTER — TREATMENT (OUTPATIENT)
Dept: PHYSICAL THERAPY | Facility: CLINIC | Age: 58
End: 2023-09-08
Payer: OTHER GOVERNMENT

## 2023-09-08 DIAGNOSIS — R29.898 SHOULDER WEAKNESS: ICD-10-CM

## 2023-09-08 DIAGNOSIS — Z98.890 S/P ARTHROSCOPY OF RIGHT SHOULDER: Primary | ICD-10-CM

## 2023-09-08 DIAGNOSIS — M25.511 ACUTE PAIN OF RIGHT SHOULDER: ICD-10-CM

## 2023-09-08 LAB
QT INTERVAL: 394 MS
QTC INTERVAL: 446 MS

## 2023-09-08 NOTE — PROGRESS NOTES
"Physical Therapy Daily Treatment Note  [address], [address] [place] [address]        Patient: Pavan Perales   : 1965  Diagnosis/ICD-10 Code:  S/P arthroscopy of right shoulder [Z98.890]  Referring practitioner: Marek Gaviria MD  Date of Initial Visit: Type: THERAPY  Noted: 2023  Today's Date: 2023  Patient seen for 5 sessions             Subjective   Pavan Perales reports having mild 3/10 pain in his right anterior-proximal shoulder upon arrival today.  He states that he has returned to work and states that he is \"Trying to not to lift anything\" with his right arm.  He is currently not wearing sling at this time.    Objective     Decreased eccentric control noted during Active Left Elbow flexion/extension    See Exercise and Manual Logs for complete treatment.     Assessment/Plan    Pt tolerated therapy session  well today with progression of therapeutic exercises, Functional activities, and Manual therapy. He has improved, but continues to have deficits in His Right Shoulder-Scapular ROM,  Strength, and Stability; limiting function and ability to perform ADLs without pain at this time.    Re-iterated to pt the importance of pt avoiding any lifting with his right arm at this time secondary to weakness and protocol restrictions.  Pt verbalized good understanding and agreement.    Progress per Plan of Care- as tolerated - as per protocol                 Timed:  Manual Therapy:    18     mins  27187;  Therapeutic Exercise:    15     mins  81526;     Neuromuscular Flower:    0    mins  58725;    Therapeutic Activity:     8     mins  61620;     Gait Trainin     mins  76642;     Ultrasound:     0     mins  15125;    Electrical Stimulation:    0     mins  64462;  Iontophoresis     0     mins  69356    Untimed:  Electrical Stimulation:    0     mins  01300 ( );  Mechanical Traction:    0     mins  48923;   Fluidotherapy     0     mins  50589  Hot pack     0     mins  21131  Cold " pack     0     mins  82905    Timed Treatment:   41   mins   Total Treatment:     41   mins        Ashlee Orantes PTA  Physical Therapist Assistant

## 2023-09-11 ENCOUNTER — OFFICE VISIT (OUTPATIENT)
Dept: FAMILY MEDICINE CLINIC | Facility: CLINIC | Age: 58
End: 2023-09-11
Payer: OTHER GOVERNMENT

## 2023-09-11 VITALS
HEART RATE: 94 BPM | OXYGEN SATURATION: 98 % | DIASTOLIC BLOOD PRESSURE: 70 MMHG | TEMPERATURE: 97.1 F | SYSTOLIC BLOOD PRESSURE: 128 MMHG | BODY MASS INDEX: 27.63 KG/M2 | WEIGHT: 204 LBS | HEIGHT: 72 IN

## 2023-09-11 DIAGNOSIS — Z09 HOSPITAL DISCHARGE FOLLOW-UP: Primary | ICD-10-CM

## 2023-09-11 DIAGNOSIS — I26.99 ACUTE PULMONARY EMBOLISM, UNSPECIFIED PULMONARY EMBOLISM TYPE, UNSPECIFIED WHETHER ACUTE COR PULMONALE PRESENT: ICD-10-CM

## 2023-09-11 NOTE — PROGRESS NOTES
Transitional Care Follow Up Visit    Subjective     Pavan Perales is a 58 y.o. male who presents for a transitional care management visit.    Within 48 business hours after discharge our office contacted him via telephone to coordinate his care and needs.      I reviewed and discussed the details of that call along with the discharge summary, hospital problems, inpatient lab results, inpatient diagnostic studies, and consultation reports with Pavan.     Current outpatient and discharge medications have been reconciled for the patient.  Reviewed by: Maite Montoya DO        9/1/2023     5:37 PM   Date of TCM Phone Call   [facility]   Date of Admission 8/29/2023   Date of Discharge 9/1/2023   Discharge Disposition Home or Self Care     Risk for Readmission (LACE) Score: 13 (9/1/2023  6:00 AM)    History of Present Illness     Course During Hospital Stay: Patient presented to the emergency room on 8/29/2030 after previous visit, where patient was diagnosed with PNA.  Diagnosed with PE - recent shoulder surgery.  Advised that findings on chest x-ray was likely secondary to complications of her PE.  Started on heparin drip and transition to Eliquis.  Lower extremity duplex negative for clots.  Upper extremity duplex negative for clot.  Right heart not visualized well on echocardiogram with recommendation for follow-up on formal echocardiogram read.  Patient presents today reporting feeling fairly well.  He does admit to intermittent left-sided chest pain.  No complaints of difficulty breathing.  No complaints regarding current dosing of Eliquis.    Review of Systems   Cardiovascular:         Intermittent left chest pain     Objective     Physical Exam  Vitals reviewed.   Constitutional:       General: He is not in acute distress.     Appearance: Normal appearance. He is well-developed.   HENT:      Head: Normocephalic and atraumatic.      Right Ear: Hearing and external ear normal.      Left  Ear: Hearing and external ear normal.      Nose: Nose normal.   Eyes:      General: Lids are normal.         Right eye: No discharge.         Left eye: No discharge.      Conjunctiva/sclera: Conjunctivae normal.   Cardiovascular:      Rate and Rhythm: Normal rate and regular rhythm.   Pulmonary:      Effort: Pulmonary effort is normal.      Breath sounds: Normal breath sounds.   Abdominal:      General: There is no distension.   Musculoskeletal:         General: No swelling.      Cervical back: Neck supple.   Skin:     Coloration: Skin is not jaundiced.      Findings: No erythema.   Neurological:      Mental Status: He is alert. Mental status is at baseline.   Psychiatric:         Mood and Affect: Mood and affect normal.         Thought Content: Thought content normal.     Assessment & Plan     Diagnoses and all orders for this visit:    1. Hospital discharge follow-up (Primary)  -     Ambulatory Referral to Cardiology    2. Acute pulmonary embolism, unspecified pulmonary embolism type, unspecified whether acute cor pulmonale present  -     Ambulatory Referral to Cardiology    Discharge summary reviewed. Will refer to cardiology for input regarding left chest pain and recommendation for follow up on formal echocardiogram interpretation. Unclear if patient needs another study. Continue Eliquis, as prescribed. Expect to follow up with this office in 1 mo, sooner, if any alarms.

## 2023-09-12 ENCOUNTER — TREATMENT (OUTPATIENT)
Dept: PHYSICAL THERAPY | Facility: CLINIC | Age: 58
End: 2023-09-12
Payer: OTHER GOVERNMENT

## 2023-09-12 ENCOUNTER — READMISSION MANAGEMENT (OUTPATIENT)
Dept: CALL CENTER | Facility: HOSPITAL | Age: 58
End: 2023-09-12
Payer: OTHER GOVERNMENT

## 2023-09-12 DIAGNOSIS — Z98.890 S/P ARTHROSCOPY OF RIGHT SHOULDER: Primary | ICD-10-CM

## 2023-09-12 DIAGNOSIS — M25.511 ACUTE PAIN OF RIGHT SHOULDER: ICD-10-CM

## 2023-09-12 DIAGNOSIS — R29.898 SHOULDER WEAKNESS: ICD-10-CM

## 2023-09-12 NOTE — OUTREACH NOTE
Medical Week 2 Survey      Flowsheet Row Responses   [facility] patient discharged from? [place]   Does the patient have one of the following disease processes/diagnoses(primary or secondary)? Other   Week 2 attempt successful? Yes   Call start time 1427   Discharge diagnosis Acute bilateral pulmonary emboli involving bilateral main pulmonary arteries extending into lobar and segmental branches bilaterally.  Provoked due to recent shoulder surgery   Call end time 1428   Person spoke with today (if not patient) and relationship wife   Meds reviewed with patient/caregiver? Yes   Is the patient having any side effects they believe may be caused by any medication additions or changes? No   Does the patient have all medications ordered at discharge? Yes   Is the patient taking all medications as directed (includes completed medication regime)? Yes   Does the patient have a primary care provider?  Yes   Does the patient have an appointment with their PCP within 7 days of discharge? Yes   Has the patient kept scheduled appointments due by today? Yes   Has home health visited the patient within 72 hours of discharge? N/A   Psychosocial issues? No   What is the patient's perception of their health status since discharge? Improving   Week 2 Call Completed? Yes   Graduated Yes   Call end time 1428            Haven CASEY - Registered Nurse

## 2023-09-12 NOTE — PROGRESS NOTES
Physical Therapy Daily Treatment Note  [address] [facility], [address] [place] [address]        Patient: Pavan Perales   : 1965  Diagnosis/ICD-10 Code:  S/P arthroscopy of right shoulder [Z98.890]  Referring practitioner: Marek Gaviria MD  Date of Initial Visit: Type: THERAPY  Noted: 2023  Today's Date: 2023  Patient seen for 6 sessions             Subjective   Pavan Perales reports having 1-2/10 pain in his right shoulder upon arrival today.    Objective     Right Shoulder  Standing AROM    Flexion:  115 degrees  Abduction:  110 degrees  External Rotation:  60 degrees  (Measurements taken post session-Pain free range        See Exercise and Manual Logs for complete treatment.       Assessment/Plan      Pt tolerated therapy session  well today with progression of therapeutic exercises, Functional activities, and Manual therapy. He has improved, but continues to have deficits in His Right Shoulder-Scapular ROM,  Strength, and Stability; limiting function and ability to perform ADLs without pain at this time.    Re-iterated to pt the importance of pt avoiding any lifting with his right arm at this time secondary to weakness and protocol restrictions.  Pt verbalized good understanding and agreement.  Mild pinching reported during Pulleys today- decrease in duration today.  He reported less pinching pain during Right Shoulder AROM post session after manual therapy today.     Progress per Plan of Care- as tolerated - as per protocol.  May trial Manual therapy at beginning of session next visit in effort to decrease any pinching / impingement like symptoms.                      Timed:  Manual Therapy:    18     mins  76004;  Therapeutic Exercise:    15     mins  45176;     Neuromuscular Flower:    0    mins  11725;    Therapeutic Activity:     8     mins  34428;     Gait Trainin     mins  45366;     Ultrasound:     0     mins  07116;    Electrical Stimulation:    0     mins   65135;  Iontophoresis     0     mins  95986    Untimed:  Electrical Stimulation:    0     mins  16921 ( );  Mechanical Traction:    0     mins  22519;   Fluidotherapy     0     mins  90054  Hot pack     0     mins  91342  Cold pack     0     mins  83868    Timed Treatment:   41   mins   Total Treatment:     41   mins        Ashlee Orantes PTA  Physical Therapist Assistant

## 2023-09-13 ENCOUNTER — TELEPHONE (OUTPATIENT)
Dept: SURGERY | Facility: CLINIC | Age: 58
End: 2023-09-13
Payer: OTHER GOVERNMENT

## 2023-09-13 NOTE — TELEPHONE ENCOUNTER
[facility] received a fax that requires your attention. The document has been indexed to the patient’s chart for your review.      Reason for sending: [facility] REQUESTING RFS FOR REPEAT EGD.     Documents Description: RFS REQUEST_[facility] DEPT [email or web address]    Name of Sender:  [facility] US [facility]    Date Indexed: 09/13/23    Notes (if needed):

## 2023-09-15 ENCOUNTER — TREATMENT (OUTPATIENT)
Dept: PHYSICAL THERAPY | Facility: CLINIC | Age: 58
End: 2023-09-15
Payer: OTHER GOVERNMENT

## 2023-09-15 DIAGNOSIS — R29.898 SHOULDER WEAKNESS: ICD-10-CM

## 2023-09-15 DIAGNOSIS — M25.511 ACUTE PAIN OF RIGHT SHOULDER: ICD-10-CM

## 2023-09-15 DIAGNOSIS — Z98.890 S/P ARTHROSCOPY OF RIGHT SHOULDER: Primary | ICD-10-CM

## 2023-09-15 NOTE — PROGRESS NOTES
"Physical Therapy Daily Treatment Note  [address] [facility] [address], [address] [place] [address]        Patient: Pavan Perales   : 1965  Diagnosis/ICD-10 Code:  S/P arthroscopy of right shoulder [Z98.890]  Referring practitioner: Marek Gaviria MD  Date of Initial Visit: Type: THERAPY  Noted: 2023  Today's Date: 9/15/2023  Patient seen for 7 sessions             Subjective   Pavan Perales reports   having 2/10 pain at  superior shoulder upon arrival today.  He describes as more \"Soreness\" versus pain.    Objective     + \"Pinching\" pain in Right proximal shoulder - primarily reported with shoulder flexion- AAROM/PROM.    See Exercise and Manual Logs for complete treatment.       Assessment/Plan    Pt tolerated therapy session  well today with progression of therapeutic exercises, Functional activities, and Manual therapy. He has improved, but continues to have deficits in His Right Shoulder-Scapular ROM,  Strength, and Stability; limiting function and ability to perform ADLs without pain at this time.    Re-iterated to pt the importance of pt avoiding any lifting with his right arm at this time secondary to weakness and protocol restrictions.  Pt verbalized good understanding and agreement.  Mild pinching reported during Pulleys today- decrease in duration. He reported less pinching pain during Right Shoulder AROM post session after manual therapy today.     Progress per Plan of Care- as tolerated - as per protocol.  May trial Manual therapy at beginning of session next visit in effort to decrease any pinching / impingement like symptoms.                     Timed:  Manual Therapy:    15     mins  58974;  Therapeutic Exercise:    15     mins  81873;     Neuromuscular Flower:    0    mins  56316;    Therapeutic Activity:     8     mins  18257;     Gait Trainin     mins  06770;     Ultrasound:     0     mins  67867;    Electrical Stimulation:    0     mins  33274;  Iontophoresis     0     mins  " 87297    Untimed:  Electrical Stimulation:    0     mins  59727 ( );  Mechanical Traction:    0     mins  59181;   Fluidotherapy     0     mins  90596  Hot pack     0     mins  47988  Cold pack     0     mins  05449    Timed Treatment:   38   mins   Total Treatment:     38   mins        Ashlee Orantes PTA  Physical Therapist Assistant

## 2023-09-18 ENCOUNTER — OFFICE VISIT (OUTPATIENT)
Dept: CARDIOLOGY | Facility: CLINIC | Age: 58
End: 2023-09-18
Payer: OTHER GOVERNMENT

## 2023-09-18 VITALS
BODY MASS INDEX: 28.42 KG/M2 | DIASTOLIC BLOOD PRESSURE: 85 MMHG | SYSTOLIC BLOOD PRESSURE: 125 MMHG | HEIGHT: 72 IN | WEIGHT: 209.8 LBS | HEART RATE: 62 BPM

## 2023-09-18 DIAGNOSIS — E78.2 MIXED DYSLIPIDEMIA: ICD-10-CM

## 2023-09-18 DIAGNOSIS — I26.99 PULMONARY EMBOLISM, BILATERAL: Primary | ICD-10-CM

## 2023-09-18 PROCEDURE — 99203 OFFICE O/P NEW LOW 30 MIN: CPT | Performed by: INTERNAL MEDICINE

## 2023-09-18 NOTE — PROGRESS NOTES
Chief Complaint  PE      History of Present Illness  Pavan Perales presents to [facility] CARD[facility]    This is a very pleasant 58-year-old gentleman with past medical history as outlined below, presents to clinic to follow-up on recent hospitalization.  He was admitted to the hospital with shortness of breath and hypoxia.  He was diagnosed with bilateral pulmonary embolism which was thought to be provoked by shoulder surgery.  He was treated with anticoagulation and was discharged on Eliquis.  His echocardiogram was unremarkable without evidence of RV strain.  He is feeling better.  His shortness of breath improved but not completely resolved.  He has no chest pain, palpitations, dizziness, presyncope or syncope.    Past Medical History:   Diagnosis Date    Acid reflux     Back pain     Cancer     skin    Hyperlipidemia     Hypertension     Insomnia     PTSD (post-traumatic stress disorder)     no isssues when waking from anesthesia         Current Outpatient Medications:     apixaban (ELIQUIS) 5 MG tablet tablet, Take 2 tablets by mouth 2 (Two) Times a Day for 7 days, THEN 1 tablet 2 (Two) Times a Day for 23 days., Disp: 74 tablet, Rfl: 0    atorvastatin (LIPITOR) 20 MG tablet, Take 1 tablet by mouth Daily., Disp: , Rfl:     HYDROcodone-acetaminophen (NORCO)  MG per tablet, Take 1 tablet by mouth Every 6 (Six) Hours As Needed for Moderate Pain., Disp: 28 tablet, Rfl: 0    pantoprazole (Protonix) 40 MG EC tablet, Take 1 tablet by mouth Daily. (Patient taking differently: Take 20 mg by mouth 2 (Two) Times a Day.), Disp: 30 tablet, Rfl: 2    traZODone (DESYREL) 100 MG tablet, Take 3 tablets by mouth Every Night., Disp: , Rfl:     There are no discontinued medications.  No Known Allergies     Social History     Tobacco Use    Smoking status: Never    Smokeless tobacco: Current     Types: Snuff   Vaping Use    Vaping Use: Never used   Substance Use Topics    Alcohol use: Yes     Comment:  "RARELY    Drug use: Not Currently       Family History   Problem Relation Age of Onset    Malig Hyperthermia Neg Hx         Objective     /85   Pulse 62   Ht 182.9 cm (72.01\")   Wt 95.2 kg (209 lb 12.8 oz)   BMI 28.45 kg/m²       Physical Exam  Constitutional:       General: Awake. Not in acute distress.     Appearance: Normal appearance.   Neck:      Vascular: No carotid bruit, hepatojugular reflux or JVD.   Cardiovascular:      Rate and Rhythm: Normal rate and regular rhythm.      Chest Wall: PMI is not displaced.      Heart sounds: Normal heart sounds, S1 normal and S2 normal. No murmur heard.   No friction rub. No gallop. No S3 or S4 sounds.    Pulmonary:      Effort: Pulmonary effort is normal.      Breath sounds: Normal breath sounds. No wheezing, rhonchi or rales.   Ext.      No edema.   Skin:     General: Skin is warm and dry.      Coloration: Skin is not cyanotic.      Findings: No petechiae or rash.   Neurological:      Mental Status: Alert and oriented x 3  Psychiatric:         Behavior: Behavior is cooperative.       Result Review :     proBNP   Date Value Ref Range Status   08/29/2023 73.5 0.0 - 900.0 pg/mL Final     CMP          8/26/2023    11:28 8/29/2023    16:30 8/30/2023    01:34   CMP   Glucose 137  136  121    BUN 14  9  10    Creatinine 1.05  1.16  1.00    EGFR 82.3  73.0  87.2    Sodium 135  134  134    Potassium 3.6  4.3  4.2    Chloride 101  97  100    Calcium 8.7  8.7  8.9    Total Protein 6.3  6.2     Albumin 3.7  3.2     Globulin 2.6  3.0     Total Bilirubin 0.6  0.3     Alkaline Phosphatase 104  142     AST (SGOT) 18  21     ALT (SGPT) 28  21     Albumin/Globulin Ratio 1.4  1.1     BUN/Creatinine Ratio 13.3  7.8  10.0    Anion Gap 9.9  11.9  9.6      CBC w/diff          8/30/2023    01:34 8/31/2023    03:53 9/1/2023    04:34   CBC w/Diff   WBC 7.14  5.95  6.04    RBC 4.31  4.31  4.13    Hemoglobin 12.8  12.6  12.3    Hematocrit 37.2  37.4  36.0    MCV 86.3  86.8  87.2    MCH " 29.7  29.2  29.8    MCHC 34.4  33.7  34.2    RDW 12.9  12.8  12.6    Platelets 185  223  239    Neutrophil Rel % 73.5  69.4  70.4    Immature Granulocyte Rel % 0.7  0.5  0.5    Lymphocyte Rel % 11.9  13.9  13.6    Monocyte Rel % 8.7  8.9  7.8    Eosinophil Rel % 4.5  6.6  7.0    Basophil Rel % 0.7  0.7  0.7       Lab Results   Component Value Date    TSH 2.050 10/02/2020      No results found for: FREET4   No results found for: DDIMERQUANT  No results found for: MG   No results found for: DIGOXIN   Lab Results   Component Value Date    TROPONINT <6 08/29/2023      No results found for: POCTROP(         Results for orders placed during the hospital encounter of 08/29/23    Adult Transthoracic Echo Complete W/ Cont if Necessary Per Protocol    Interpretation Summary    Left ventricular ejection fraction appears to be 56 - 60%.    Left ventricular diastolic function was normal.    There were no apparent intracardiac masses, vegetations or thrombi.         Results for orders placed during the hospital encounter of 08/29/23    Adult Transthoracic Echo Complete W/ Cont if Necessary Per Protocol    Interpretation Summary    Left ventricular ejection fraction appears to be 56 - 60%.    Left ventricular diastolic function was normal.    There were no apparent intracardiac masses, vegetations or thrombi.     No results found for this or any previous visit.                Diagnoses and all orders for this visit:    1. Pulmonary embolism, bilateral (Primary)    2. Mixed dyslipidemia      Assessment:    Recent bilateral pulm embolism: Hospital records were reviewed.  His echocardiogram was noted and was unremarkable.  LVEF was normal.  No evidence of RV strain was noted.  He continues to be on anticoagulation with Eliquis.  He seems to be feeling better.  He was advised to follow-up with his primary care provider to determine the length of anticoagulation.     Mixed dyslipidemia: On statin therapy continue same.    The patient  will be seen on as-needed basis.    Follow Up       Return if symptoms worsen or fail to improve.    Patient was given instructions and counseling regarding his condition or for health maintenance advice. Please see specific information pulled into the AVS if appropriate.

## 2023-09-19 ENCOUNTER — TREATMENT (OUTPATIENT)
Dept: PHYSICAL THERAPY | Facility: CLINIC | Age: 58
End: 2023-09-19
Payer: OTHER GOVERNMENT

## 2023-09-19 DIAGNOSIS — M25.511 ACUTE PAIN OF RIGHT SHOULDER: ICD-10-CM

## 2023-09-19 DIAGNOSIS — R29.898 SHOULDER WEAKNESS: ICD-10-CM

## 2023-09-19 DIAGNOSIS — Z98.890 S/P ARTHROSCOPY OF RIGHT SHOULDER: Primary | ICD-10-CM

## 2023-09-19 NOTE — PROGRESS NOTES
Physical Therapy Daily Treatment Note  [address], [address] [place] [address]        Patient: Pavan Perales   : 1965  Diagnosis/ICD-10 Code:  S/P arthroscopy of right shoulder [Z98.890]  Referring practitioner: Marek Gaviria MD  Date of Initial Visit: Type: THERAPY  Noted: 2023  Today's Date: 2023  Patient seen for 8 sessions             Subjective   Pavan Perales denies having any pain in his Right shoulder when at rest.  He does however, report ease of symptoms aggravation with certain movements of shoulder.    Objective     Right Shoulder AAROM:  Supine:  Flexion:  155 degrees    See Exercise and Manual Logs for complete treatment.       Assessment/Plan      Pt tolerated therapy session moderately well today with progression of therapeutic exercises, Functional activities, and Manual therapy. He has improved, but continues to have deficits in His Right Shoulder-Scapular ROM,  Strength, and Stability; limiting function and ability to perform ADLs without pain at this time.    Re-iterated to pt the importance of pt avoiding any lifting with his right arm at this time secondary to weakness and protocol restrictions.  Pt verbalized good understanding and agreement.   He reported less pinching pain during Right Shoulder AROM post session after manual therapy .   Added Scapular Punches in hooklying today in effort to improve scapular stability and stabilization.  Held pulleys secondary to ease of symptom exacerbation with this activity.       Progress per Plan of Care- as tolerated - as per protocol.                    Timed:  Manual Therapy:    12     mins  48664;  Therapeutic Exercise:    18     mins  09840;     Neuromuscular Flower:    0    mins  62767;    Therapeutic Activity:     8     mins  32258;     Gait Trainin     mins  04322;     Ultrasound:     0     mins  56263;    Electrical Stimulation:    0     mins  37039;  Iontophoresis     0     mins   93643    Untimed:  Electrical Stimulation:    0     mins  76488 ( );  Mechanical Traction:    0     mins  76193;   Fluidotherapy     0     mins  84304  Hot pack     0     mins  55835  Cold pack     0     mins  11130    Timed Treatment:   38   mins   Total Treatment:     38   mins        Ashlee Orantes PTA  Physical Therapist Assistant

## 2023-09-20 ENCOUNTER — TELEPHONE (OUTPATIENT)
Dept: SURGERY | Facility: CLINIC | Age: 58
End: 2023-09-20

## 2023-09-20 NOTE — TELEPHONE ENCOUNTER
[facility] received a fax that requires your attention. The document has been indexed to the patient’s chart for your review.      Reason for sending: [facility] AUTH APPROVAL OF EGD, REQUEST PROVIDERS REVIEW.     Documents Description: [facility] AUTH APPROVAL_[facility] US [id number]    Name of Sender: [facility] US DEPT    Date Indexed: 09/20/23    Notes (if needed):

## 2023-09-22 ENCOUNTER — TREATMENT (OUTPATIENT)
Dept: PHYSICAL THERAPY | Facility: CLINIC | Age: 58
End: 2023-09-22
Payer: OTHER GOVERNMENT

## 2023-09-22 DIAGNOSIS — M25.511 ACUTE PAIN OF RIGHT SHOULDER: ICD-10-CM

## 2023-09-22 DIAGNOSIS — Z98.890 S/P ARTHROSCOPY OF RIGHT SHOULDER: Primary | ICD-10-CM

## 2023-09-22 NOTE — PROGRESS NOTES
Physical Therapy Daily Treatment Note  [address] [facility], [address], [place] [address]      Patient: Pavan Perales   : 1965  Diagnosis/ICD-10 Code:  S/P arthroscopy of right shoulder [Z98.890]  Referring practitioner: Marek Gaviria MD  Date of Initial Visit: Type: THERAPY  Noted: 2023  Today's Date: 2023  Patient seen for 9 sessions             Subjective   Pavan Perales reports: no pain at beginning of session today.    Objective   See Exercise, Manual, and Modality Logs for complete treatment.       Assessment/Plan  Patient tolerated all exercise progressions and manual therapy well today but continues to demonstrate R shoulder strength/ROM deficits limiting function. Verbal and tactile cues required for proper scapular activation to avoid upper trapezius compensation.  Continue to progress per patient tolerance.    Progress per Plan of Care           Timed:  Manual Therapy:    15     mins  44577;  Therapeutic Exercise:    15     mins  26530;     Neuromuscular Flower:    0    mins  27061;    Therapeutic Activity:     0     mins  88569;     Gait Trainin     mins  71403;     Ultrasound:     0     mins  18619;    Electrical Stimulation:    0     mins  12576;  Iontophoresis     0     mins  20296    Untimed:  Electrical Stimulation:    0     mins  37600 (MC );  Mechanical Traction:    0     mins  26232;   Fluidotherapy     0     mins  87238  Hot pack     0     Mins    Cold pack                       0     Mins     Timed Treatment:   30   mins   Total Treatment:     38   mins        Mary Jo Delgado PT    Electronically signed [unfilled]  [place] License: [id number]  NPI number: [id number]

## 2023-09-27 ENCOUNTER — TREATMENT (OUTPATIENT)
Dept: PHYSICAL THERAPY | Facility: CLINIC | Age: 58
End: 2023-09-27
Payer: OTHER GOVERNMENT

## 2023-09-27 DIAGNOSIS — R29.898 SHOULDER WEAKNESS: ICD-10-CM

## 2023-09-27 DIAGNOSIS — Z98.890 S/P ARTHROSCOPY OF RIGHT SHOULDER: Primary | ICD-10-CM

## 2023-09-27 DIAGNOSIS — M25.511 ACUTE PAIN OF RIGHT SHOULDER: ICD-10-CM

## 2023-09-27 NOTE — PROGRESS NOTES
"Physical Therapy Daily Treatment Note  [address], [address] [place] [address]        Patient: Pavan Perales   : 1965  Diagnosis/ICD-10 Code:  S/P arthroscopy of right shoulder [Z98.890]  Referring practitioner: Marek Gaviria MD  Date of Initial Visit: Type: THERAPY  Noted: 2023  Today's Date: 2023  Patient seen for 10 sessions             Subjective   Pavan Perales reports having persistent \"Soreness\" in Right shoulder.  He also continues to report intermittent \"Crunching\" in shoulder.    Objective       Right Shoulder AROM:  Flexion: 135 degrees  Abduction:  130 degrees  IR:  L3 (BTB)  ER:  53 degrees  (No pain reported with AROM)    See Exercise and Manual Logs for complete treatment.       Assessment/Plan    Pt tolerated therapy session  moderately well today with progression of therapeutic exercises, Functional activities, and Manual therapy. He has improved, but continues to have deficits in His Right Shoulder-Scapular ROM,  Strength, and Stability; limiting function and ability to perform ADLs without pain at this time. Held pulleys secondary to ease of symptom exacerbation with this activity.  Continue to focus on improving scapular stabilization in effort to decrease \"Pinching\" in right shoulder.     Progress per Plan of Care- as tolerated - as per protocol.                      Timed:  Manual Therapy:    15     mins  20469;  Therapeutic Exercise:    15     mins  64525;     Neuromuscular Flower:    0    mins  13635;    Therapeutic Activity:     8     mins  45645;     Gait Trainin     mins  08001;     Ultrasound:     0     mins  27027;    Electrical Stimulation:    0     mins  62686;  Iontophoresis     0     mins  71904    Untimed:  Electrical Stimulation:    0     mins  22152 ( );  Mechanical Traction:    0     mins  02782;   Fluidotherapy     0     mins  85246  Hot pack     0     mins  56297  Cold pack     0     mins  44575    Timed Treatment:   38   mins "   Total Treatment:     38   mins        Ashlee Orantes PTA  Physical Therapist Assistant

## 2023-09-29 ENCOUNTER — TREATMENT (OUTPATIENT)
Dept: PHYSICAL THERAPY | Facility: CLINIC | Age: 58
End: 2023-09-29
Payer: OTHER GOVERNMENT

## 2023-09-29 DIAGNOSIS — R29.898 SHOULDER WEAKNESS: ICD-10-CM

## 2023-09-29 DIAGNOSIS — M25.511 ACUTE PAIN OF RIGHT SHOULDER: ICD-10-CM

## 2023-09-29 DIAGNOSIS — Z98.890 S/P ARTHROSCOPY OF RIGHT SHOULDER: Primary | ICD-10-CM

## 2023-09-29 NOTE — PROGRESS NOTES
"Physical Therapy Daily Treatment Note  [address] [facility], [address] [place] [address]        Patient: Pavan Perales   : 1965  Diagnosis/ICD-10 Code:  S/P arthroscopy of right shoulder [Z98.890]  Referring practitioner: Marek Gaviria MD  Date of Initial Visit: Type: THERAPY  Noted: 2023  Today's Date: 2023  Patient seen for 11 sessions             Subjective   Pavan Perales reports having mild \"Soreness\" in Right shoulder with intermittent -frequent \"Catching\" and \"Grinding\".    Objective     Active Range of Motion:  Right Shoulder  Flexion: 138 degrees  Abduction:  130 degrees  IR:  T11 (BTB)  ER:  53 degrees  (No pain reported with AROM)    Passive Range of Motion    Right Shoulder   Flexion: 160 degrees  Abduction: 135 degrees   External rotation 40°: 65 degrees   Internal rotation 40°: 70 degrees    +Palpable Popping/Grinding noted at shoulder with AROM    Decreased Scapular Strength/Stability    Decreased Scapulohumeral rhythm noted with Right shoulder AROM    Right Shoulder Strength:  Flexion:  3/5  Abduction: 3/5     See Exercise and Manual Logs for complete treatment.              Assessment/Plan    Pt tolerated therapy session  well today with progression of therapeutic exercises, Functional activities, and Manual therapy. He has improved, but continues to have deficits in His Right Shoulder-Scapular ROM,  Strength, and Stability; limiting function and ability to perform all ADLs without pain at this time. He has made notable improvements in Right shoulder AROM/PROM.  Most limited by reports of intermittent- \"Grinding\"- \"Catching\" in right shoulder with certain movements- exhibiting painful arc at times.       Progress per Plan of Care- as tolerated - as per protocol- focusing on improving scapular strength and stability.                Timed:  Manual Therapy:    12     mins  13312;  Therapeutic Exercise:    18     mins  15033;     Neuromuscular Flower:    0    mins  09554;  "   Therapeutic Activity:     0     mins  04971;     Gait Trainin     mins  34691;     Ultrasound:     0     mins  15359;    Electrical Stimulation:    0     mins  85392;  Iontophoresis     0     mins  27292    Untimed:  Electrical Stimulation:    0     mins  93506 ( );  Mechanical Traction:    0     mins  40663;   Fluidotherapy     0     mins  65762  Hot pack     0     mins  53772  Cold pack     0     mins  35045    Timed Treatment:   30   mins   Total Treatment:     30   mins        Ashlee Orantes PTA  Physical Therapist Assistant

## 2023-09-29 NOTE — LETTER
" Physical Therapy MD Progress Note         [address] [facility], [address] [place] [address]        Patient: Pavan Perales  : 1965  Diagnosis/ICD-10 Code:  S/P arthroscopy of right shoulder [Z98.890]  Referring practitioner: Marek Gaviria MD  Date of Initial Visit: Type: THERAPY  Noted: 2023  Today's Date: 2023  Patient seen for 11 sessions          Subjective   Pavan Perales reports having mild \"Soreness\" in Right shoulder with intermittent -frequent \"Catching\" and \"Grinding\" of shoulder with certain movements.     Objective     Active Range of Motion  Right Shoulder   Flexion: 138 degrees  Abduction:  130 degrees  IR:  T11 (BTB)  ER:  53 degrees  (No pain reported with AROM)    Passive Range of Motion    Right Shoulder   Flexion: 160 degrees  Abduction: 135 degrees   External rotation 40°: 65 degrees   Internal rotation 40°: 70 degrees    +Palpable Popping/Grinding noted at shoulder with AROM    Decreased Scapular Strength/Stability    Decreased Scapulohumeral rhythm noted with Right shoulder AROM    Right Shoulder Strength:  Flexion:  3/5  Abduction: 3/5         Assessment/Plan    Pt has tolerated therapy sessions well  with progression of therapeutic exercises, Functional activities, and Manual therapy. He has improved, but continues to have deficits in His Right Shoulder-Scapular ROM, Strength, and Stability; limiting function and ability to perform all ADLs without pain at this time. He has made notable improvements in Right shoulder AROM/PROM.  He is currently most limited by reports of intermittent- \"Grinding\"- \"Catching\" pain-discomfort in right shoulder with certain movements- exhibiting painful arc at times.      Plan:  Progress per Plan of Care- as tolerated - as per protocol- focusing on improving scapular strength and stability.     Please advise regarding any new orders-    Thank you for this referral!        Ashlee Orantes PTA  Physical Therapist's Assistant      "

## 2023-10-02 ENCOUNTER — OFFICE VISIT (OUTPATIENT)
Dept: ORTHOPEDIC SURGERY | Facility: CLINIC | Age: 58
End: 2023-10-02
Payer: OTHER GOVERNMENT

## 2023-10-02 VITALS — BODY MASS INDEX: 28.63 KG/M2 | WEIGHT: 200 LBS | HEIGHT: 70 IN

## 2023-10-02 DIAGNOSIS — Z47.89 AFTERCARE FOLLOWING SURGERY OF THE MUSCULOSKELETAL SYSTEM: Primary | ICD-10-CM

## 2023-10-02 RX ORDER — TRIAMCINOLONE ACETONIDE 40 MG/ML
40 INJECTION, SUSPENSION INTRA-ARTICULAR; INTRAMUSCULAR
Status: COMPLETED | OUTPATIENT
Start: 2023-10-02 | End: 2023-10-02

## 2023-10-02 RX ORDER — LIDOCAINE HYDROCHLORIDE 10 MG/ML
5 INJECTION, SOLUTION EPIDURAL; INFILTRATION; INTRACAUDAL; PERINEURAL
Status: COMPLETED | OUTPATIENT
Start: 2023-10-02 | End: 2023-10-02

## 2023-10-02 RX ADMIN — TRIAMCINOLONE ACETONIDE 40 MG: 40 INJECTION, SUSPENSION INTRA-ARTICULAR; INTRAMUSCULAR at 08:19

## 2023-10-02 RX ADMIN — LIDOCAINE HYDROCHLORIDE 5 ML: 10 INJECTION, SOLUTION EPIDURAL; INFILTRATION; INTRACAUDAL; PERINEURAL at 08:19

## 2023-10-02 NOTE — PATIENT INSTRUCTIONS
Advised to continue Eliquis as per PCP for treatment of PE.     Patient is doing well and advised to continue therapy as per protocol. Advised to continue physical therapy. No lifting, pushing, or pulling. May progress to light strengthening efforts.     Continue icing shoulder up to 3-4 times daily for no longer than 15 to 20 minutes at a time as needed for pain or swelling.    Right shoulder steroid injection administered in office today. Advised on 3 month duration between injections.     Follow-up in 6 weeks.  Call with changes or concerns.  No imaging.

## 2023-10-02 NOTE — PROGRESS NOTES
"Chief Complaint  Pain and Follow-up of the Right Shoulder    Subjective      Pavan Perales presents to [facility] for follow-up of right shoulder arthroscopy with subacromial decompression, distal claviculectomy, and biceps tenodesis performed on 8/17/2023 by Dr. Gaviria.  Patient presents today for follow-up reporting that his shoulder is \"getting better\".  He does report concern for a \"grinding and cracking sensation\" to the right shoulder with certain ROM's.  He remains in outpatient physical therapy through [facility], participating twice weekly.  Reports primarily focusing on stretching and ROM efforts.  He has started some light resistance band work.  States that his shoulder is \"sore, but no pain\".  Of note, does report that approximately 2 to 3 days after his last office visit he developed chest pain, shortness of breath, and hemoptysis and was diagnosed with pulmonary emboli.  He is currently on Eliquis under the care of his PCP for this.    Objective   No Known Allergies    Vital Signs:   Ht 177.8 cm (70\")   Wt 90.7 kg (200 lb)   BMI 28.70 kg/m²       Physical Exam    Constitutional: Awake, alert. Well nourished appearance.    Integumentary: Warm, dry, intact. No obvious rashes.    HENT: Atraumatic, normocephalic.   Respiratory: Non labored respirations .   Cardiovascular: Intact peripheral pulses.    Psychiatric: Normal mood and affect. A&O X3    Ortho Exam  Right shoulder: Skin is warm, dry, and intact.  Well-healed surgical scars noted.  Active assisted shoulder forward flexion to 160 degrees.  Full flexion extension of the wrist and elbow.  Full pronation and supination.  Patient is able to form a full fist.  Sensation intact light touch.  Distal neurovascular intact.    Imaging Results (Most Recent)       None             Large Joint Arthrocentesis  Date/Time: 10/2/2023 8:19 AM  Consent given by: patient  Site marked: site marked  Timeout: " Immediately prior to procedure a time out was called to verify the correct patient, procedure, equipment, support staff and site/side marked as required   Supporting Documentation  Indications: pain   Procedure Details  Location: shoulder (right) -   Needle gauge: 21g.  Medications administered: 5 mL lidocaine PF 1% 1 %; 40 mg triamcinolone acetonide 40 MG/ML  Patient tolerance: patient tolerated the procedure well with no immediate complications            Assessment and Plan   Problem List Items Addressed This Visit    None  Visit Diagnoses       S/p R shoulder arthroscopy with SAD, DC, open biceps tenodesis    -  Primary            Follow Up   Return in about 6 weeks (around 11/13/2023).    Tobacco Use: High Risk    Smoking Tobacco Use: Never    Smokeless Tobacco Use: Current    Passive Exposure: Not on file       Educated on risk of smoking. Discussed options for smoking cessation.    Patient Instructions   Advised to continue Eliquis as per PCP for treatment of PE.     Patient is doing well and advised to continue therapy as per protocol. Advised to continue physical therapy. No lifting, pushing, or pulling. May progress to light strengthening efforts.     Continue icing shoulder up to 3-4 times daily for no longer than 15 to 20 minutes at a time as needed for pain or swelling.    Right shoulder steroid injection administered in office today. Advised on 3 month duration between injections.     Follow-up in 6 weeks.  Call with changes or concerns.  No imaging.      Patient was given instructions and counseling regarding his condition or for health maintenance advice. Please see specific information pulled into the AVS if appropriate.

## 2023-10-03 ENCOUNTER — TREATMENT (OUTPATIENT)
Dept: PHYSICAL THERAPY | Facility: CLINIC | Age: 58
End: 2023-10-03
Payer: OTHER GOVERNMENT

## 2023-10-03 DIAGNOSIS — M25.511 ACUTE PAIN OF RIGHT SHOULDER: ICD-10-CM

## 2023-10-03 DIAGNOSIS — Z98.890 S/P ARTHROSCOPY OF RIGHT SHOULDER: Primary | ICD-10-CM

## 2023-10-03 DIAGNOSIS — R29.898 SHOULDER WEAKNESS: ICD-10-CM

## 2023-10-03 NOTE — PROGRESS NOTES
"Physical Therapy Daily Treatment Note  [address], [address] [place] [address]        Patient: Pavan Perales   : 1965  Diagnosis/ICD-10 Code:  S/P arthroscopy of right shoulder [Z98.890]  Referring practitioner: Marek Gaviria MD  Date of Initial Visit: Type: THERAPY  Noted: 2023  Today's Date: 10/3/2023  Patient seen for 12 sessions             Subjective   Pavan Perales reports having injection in his right shoulder at Orthopedic follow-up on Monday.  He reports that the PA was pleased with how his shoulder was progressing.    Per 10/2/23 Orthopedic office Note:  Patient is doing well and advised to continue therapy as per protocol. Advised to continue physical therapy. No lifting, pushing, or pulling. May progress to light strengthening efforts.     Steroid injection given 10/2/23.    Objective     See Exercise and Manual Logs for complete treatment.       Assessment/Plan    Pt tolerated therapy session  well today with progression of therapeutic exercises, Functional activities, and Manual therapy. He has improved, but continues to have deficits in His Right Shoulder-Scapular ROM,  Strength, and Stability; limiting function and ability to perform all ADLs without pain at this time. He has made notable improvements in Right shoulder AROM/PROM. Most limited by reports of intermittent- \"Grinding\"- \"Catching\" in right shoulder with certain movements- exhibiting painful arc at times.   Mild tightness noted today at end range of shoulder PROM.      Progress per Plan of Care- as tolerated - as per protocol- focusing on improving scapular strength and stability.                     Timed:  Manual Therapy:    15     mins  27896;  Therapeutic Exercise:    15     mins  91947;     Neuromuscular Flower:    0    mins  11011;    Therapeutic Activity:     8     mins  64155;     Gait Trainin     mins  50623;     Ultrasound:     0     mins  77257;    Electrical Stimulation:    0     mins  " 98986;  Iontophoresis     0     mins  88234    Untimed:  Electrical Stimulation:    0     mins  34389 ( );  Mechanical Traction:    0     mins  30468;   Fluidotherapy     0     mins  07372  Hot pack     0     mins  33891  Cold pack     0     mins  45944    Timed Treatment:   38   mins   Total Treatment:     38   mins        Ashlee Orantes PTA  Physical Therapist Assistant

## 2023-10-06 ENCOUNTER — TREATMENT (OUTPATIENT)
Dept: PHYSICAL THERAPY | Facility: CLINIC | Age: 58
End: 2023-10-06
Payer: OTHER GOVERNMENT

## 2023-10-06 DIAGNOSIS — R29.898 SHOULDER WEAKNESS: ICD-10-CM

## 2023-10-06 DIAGNOSIS — M25.511 ACUTE PAIN OF RIGHT SHOULDER: ICD-10-CM

## 2023-10-06 DIAGNOSIS — Z98.890 S/P ARTHROSCOPY OF RIGHT SHOULDER: Primary | ICD-10-CM

## 2023-10-06 NOTE — PROGRESS NOTES
Progress note  [address] [facility], [address] [place] [address]      Patient: Pavan Perales   : 1965  Diagnosis/ICD-10 Code:  S/P arthroscopy of right shoulder [Z98.890]  Referring practitioner: Marek Gaviria MD  Date of Initial Visit: Type: THERAPY  Noted: 2023  Today's Date: 10/6/2023  Patient seen for 13 sessions        Subjective:   Pavan Perales reports: no pain today.  Pt reports that in general pain and function have improved over the past month.  Pt reports that he still has intermittent popping and clicking in the R shoulder.  Pt states that after his shoulder injection at last MD appointment his pain has improved some.  Pt reports that he is still is limited in overhead reaching, lifting and other ADLs due to pain, weakness, stiffness and sx precautions.    Subjective Questionnaire: UEFS: 38/80      Subjective   Objective          Active Range of Motion     Right Shoulder   Flexion: 140 degrees   Abduction: 141 degrees   External rotation 0°: 33 degrees   Internal rotation BTB: T10     Passive Range of Motion     Right Shoulder   Flexion: 166 (pain limited) degrees with pain  Abduction: 159 degrees with pain  External rotation 90°: 81 degrees with pain  Internal rotation 90°: 58 degrees     Scapular Mobility     Right Shoulder   Scapular mobility: poor    Strength/Myotome Testing     Right Shoulder     Planes of Motion   Flexion: 4-   Extension: 4-   Abduction: 4-   External rotation at 0°: 4-   Internal rotation at 0°: 4-     Additional Strength Details  R elbow strength not tested today      Assessment & Plan       Assessment  Impairments: abnormal or restricted ROM, activity intolerance, impaired physical strength, lacks appropriate home exercise program and pain with function   Functional limitations: carrying objects, lifting, sleeping, pulling, pushing, uncomfortable because of pain, reaching behind back, reaching overhead and unable to perform repetitive tasks   Assessment details:  Patient presents s/p R shoulder arthroscopy with subacromial decompression, distal claviculectomy and open biceps tenodesis 8/17/23.  Pt demonstrates significant improvements in R shoulder ROM, strength and reports of pain/function compared to last progress note.  Pt however continues to demonstrate R shoulder/scapular/elbow weakness, decreased scapular mobility, decreased R shoulder ROM and reports pain limiting function.  Pt reports popping in R anterior shoulder during overhead movements which is most likely due to decreased scapular stabilization.  Patient would continue to benefit from skilled PT services in order to address deficits limiting function at this time.     Goals  Plan Goals: 1. The patient has limited ROM of the R shoulder.    LTG 1: 12 weeks:  The patient will demonstrate 150 degrees of R shoulder flexion, 150 degrees of shoulder abduction, 60 degrees of shoulder external rotation, and  shoulder internal rotation to T10 to allow the patient to reach into upper kitchen cabinets and manipulate clothing behind the back with greater ease.    STATUS:  ongoing   STG 1a: 6 weeks:  The patient will demonstrate 120 degrees of R shoulder flexion, 120 degrees of shoulder abduction, 45 degrees of shoulder external rotation, and shoulder internal rotation to L2.    STATUS:  partially met, continue    2. The patient has limited strength of the R shoulder.   LTG 2: 12 weeks:  The patient will demonstrate 4+ /5 strength for R shoulder flexion, abduction, external rotation, and internal rotation in order to demonstrate improved shoulder stability.    STATUS:  ongoing   STG 2a: 6 weeks:  The patient will demonstrate 4-/5 strength for R shoulder flexion, abduction, external rotation, and internal rotation.    STATUS:  met   STG2b:  4 weeks:  The patient will be independent with home exercises.     STATUS:  progressing     3. The patient complains of pain to the R shoulder.   LTG 3: 12 weeks:  The patient will report  a pain rating of 2 /10 or better in order to improve sleep quality and tolerance to performance of activities of daily living.    STATUS:  ongoing   STG 3a: 6 weeks:  The patient will report a pain rating of 4 /10 or better.     STATUS:  ongoing    4. Carrying, Moving, and Handling Objects Functional Limitation     LTG 4: 12 weeks:  The patient will demonstrate 1-19 % limitation by achieving a score of 65 on the UEFS.    STATUS:  ongoing             STG 4: 12 weeks:  The patient will demonstrate 44 % limitation by achieving a score of 45 on the UEFS.    STATUS:  ongoing         Plan  Therapy options: will be seen for skilled therapy services  Planned modality interventions: cryotherapy, TENS and thermotherapy (hydrocollator packs)  Planned therapy interventions: manual therapy, ADL retraining, neuromuscular re-education, body mechanics training, postural training, soft tissue mobilization, flexibility, spinal/joint mobilization, functional ROM exercises, strengthening, stretching, home exercise program, therapeutic activities, IADL retraining and joint mobilization  Frequency: 2x week  Duration in weeks: 12  Treatment plan discussed with: patient    Progress toward previous goals: Partially Met        Recommendations: Continue as planned  Timeframe: 2 months  Prognosis to achieve goals: good    PT SIGNATURE: Mary Jo Delgado PT     Electronically signed 10/6/2023  DATE TREATMENT INITIATED: 10/6/2023  [place] License: [id number]      Based upon review of the patient's progress and continued therapy plan, it is my medical opinion that Pavan Perales should continue physical therapy treatment at [facility]  [address] [place] [address] [address] [place] [phone].      Timed:  Manual Therapy:    12     mins  98565;  Therapeutic Exercise:    18     mins  42759;     Neuromuscular Flower:    0    mins  63869;    Therapeutic Activity:     8     mins  44707;     Gait Trainin      mins  23892;     Ultrasound:     0     mins  37475;    Electrical Stimulation:    0     mins  93888 ( );    Untimed:  Electrical Stimulation:    0     mins  19781 ( );  Mechanical Traction:    0     mins  96863;     Timed Treatment:   38   mins   Total Treatment:     44   mins

## 2023-10-10 ENCOUNTER — OFFICE VISIT (OUTPATIENT)
Dept: FAMILY MEDICINE CLINIC | Facility: CLINIC | Age: 58
End: 2023-10-10
Payer: OTHER GOVERNMENT

## 2023-10-10 VITALS
SYSTOLIC BLOOD PRESSURE: 134 MMHG | WEIGHT: 207 LBS | BODY MASS INDEX: 28.04 KG/M2 | TEMPERATURE: 98.2 F | HEIGHT: 72 IN | DIASTOLIC BLOOD PRESSURE: 72 MMHG | OXYGEN SATURATION: 98 % | HEART RATE: 111 BPM

## 2023-10-10 DIAGNOSIS — Z86.711 HISTORY OF PULMONARY EMBOLISM: Primary | ICD-10-CM

## 2023-10-10 NOTE — PROGRESS NOTES
"Chief Complaint    Acute pulmonary embolism (Follow-up)    Subjective      Pavan Perales presents to [facility]    History of Present Illness    1.) FOLLOW UP PE : Patient presents for follow-up after recent PE.  And is already being evaluated for transitional care visit.  Continues to take Eliquis 5 mg twice daily.  Expected to continue for 3 month - course expected to end at the end of November.  He presents with no complaints.  No difficulty breathing.  No tachycardia.  No lower extremity symptoms.    Objective     Vital Signs:     /72 (BP Location: Left arm, Patient Position: Sitting, Cuff Size: Adult)   Pulse 111   Temp 98.2 øF (36.8 øC) (Temporal)   Ht 182.9 cm (72\")   Wt 93.9 kg (207 lb)   SpO2 98%   BMI 28.07 kg/mý       Physical Exam  Vitals reviewed.   Constitutional:       General: He is not in acute distress.     Appearance: Normal appearance. He is well-developed.   HENT:      Head: Normocephalic and atraumatic.      Right Ear: Hearing and external ear normal.      Left Ear: Hearing and external ear normal.      Nose: Nose normal.   Eyes:      General: Lids are normal.         Right eye: No discharge.         Left eye: No discharge.      Conjunctiva/sclera: Conjunctivae normal.   Pulmonary:      Effort: Pulmonary effort is normal.      Breath sounds: Normal breath sounds.   Abdominal:      General: There is no distension.   Musculoskeletal:         General: No swelling.      Cervical back: Neck supple.   Skin:     Coloration: Skin is not jaundiced.      Findings: No erythema.   Neurological:      Mental Status: He is alert. Mental status is at baseline.   Psychiatric:         Mood and Affect: Mood and affect normal.         Thought Content: Thought content normal.     Assessment and Plan     Diagnoses and all orders for this visit:    1. History of pulmonary embolism (Primary)    Other orders  -     apixaban (ELIQUIS) 5 MG tablet tablet; Take 1 tablet " by mouth 2 (Two) Times a Day.    Patient appears to be doing well.  No bleeding concerns.  No chest symptoms.  No lower extremity symptoms.  Continue Eliquis.  Patient will follow-up at the end of course in November.  Call office with any intermittent issues.    Follow Up     Return in about 7 weeks (around 11/28/2023).    Patient was given instructions and counseling regarding his condition or for health maintenance advice. Please see specific information pulled into the AVS if appropriate.

## 2023-10-13 ENCOUNTER — TREATMENT (OUTPATIENT)
Dept: PHYSICAL THERAPY | Facility: CLINIC | Age: 58
End: 2023-10-13
Payer: OTHER GOVERNMENT

## 2023-10-13 DIAGNOSIS — M25.511 ACUTE PAIN OF RIGHT SHOULDER: ICD-10-CM

## 2023-10-13 DIAGNOSIS — R29.898 SHOULDER WEAKNESS: ICD-10-CM

## 2023-10-13 DIAGNOSIS — Z98.890 S/P ARTHROSCOPY OF RIGHT SHOULDER: Primary | ICD-10-CM

## 2023-10-13 NOTE — PROGRESS NOTES
"Physical Therapy Daily Treatment Note  [address] [facility], [address] [place] [address]        Patient: Pavan Perales   : 1965  Diagnosis/ICD-10 Code:  S/P arthroscopy of right shoulder [Z98.890]  Referring practitioner: Marek Gaviria MD  Date of Initial Visit: Type: THERAPY  Noted: 2023  Today's Date: 10/13/2023  Patient seen for 14 sessions             Subjective     Pavan Perales reports having noticed less pain and soreness in right shoulder.    Objective     See Exercise and Manual Logs for complete treatment.       Assessment/Plan      Pt tolerated therapy session  well today with progression of therapeutic exercises, Functional activities, and Manual therapy. He has improved, but continues to have deficits in His Right Shoulder-Scapular ROM,  Strength, and Stability; limiting function and ability to perform all ADLs without pain at this time. He has made notable improvements in Right shoulder AROM/PROM. Most limited by reports of intermittent- \"Grinding\"- \"Catching\" in right shoulder with certain movements- exhibiting painful arc at times.  This however, has improved significantly after recent injection.   Mild tightness noted today at end range of shoulder  IR/ER PROM.  Less reports of pain reported during exercise performance in clinic today.       Progress per Plan of Care- as tolerated - as per protocol- focusing on improving scapular strength and stability.             Timed:  Manual Therapy:    12     mins  39671;  Therapeutic Exercise:    18     mins  85829;     Neuromuscular Flower:    0    mins  52967;    Therapeutic Activity:     8     mins  78959;     Gait Trainin     mins  92485;     Ultrasound:     0     mins  20627;    Electrical Stimulation:    0     mins  34609;  Iontophoresis     0     mins  32594    Untimed:  Electrical Stimulation:    0     mins  75257 ( );  Mechanical Traction:    0     mins  39287;   Fluidotherapy     0     mins  17596  Hot pack     0     " mins  36532  Cold pack     0     mins  55836    Timed Treatment:   38   mins   Total Treatment:     38   mins        Ashlee Orantes PTA  Physical Therapist Assistant

## 2023-10-16 RX ORDER — PANTOPRAZOLE SODIUM 20 MG/1
20 TABLET, DELAYED RELEASE ORAL 2 TIMES DAILY
Qty: 180 TABLET | Refills: 6 | Status: SHIPPED | OUTPATIENT
Start: 2023-10-16 | End: 2024-10-15

## 2023-10-18 ENCOUNTER — OFFICE VISIT (OUTPATIENT)
Dept: SURGERY | Facility: CLINIC | Age: 58
End: 2023-10-18
Payer: OTHER GOVERNMENT

## 2023-10-18 VITALS
SYSTOLIC BLOOD PRESSURE: 124 MMHG | HEART RATE: 57 BPM | WEIGHT: 204 LBS | HEIGHT: 72 IN | DIASTOLIC BLOOD PRESSURE: 82 MMHG | RESPIRATION RATE: 16 BRPM | BODY MASS INDEX: 27.63 KG/M2

## 2023-10-18 DIAGNOSIS — K21.9 GERD WITHOUT ESOPHAGITIS: Primary | ICD-10-CM

## 2023-10-18 NOTE — PROGRESS NOTES
Chief Complaint: Heartburn (GERD)    Subjective         History of Present Illness  Pavan Perales is a 58 y.o. male presents to [facility] GENERAL SURGERY. The patient is to be seen for scope follow-up.    EGD  The patient had an EGD in 01/2023. They did some biopsies and he had a little bit of duodenitis and hyperplastic polyp. His PCP told him he could not take Protonix twice a day. He is eating 2 doses of medication and it is working for the patient. He has not experienced any issues since he began taking the medication. We had talked about not doing another EGD for maybe a couple of years just because of duodenitis and he did need a colonoscopy for 5 years.    Objective     Past Medical History:   Diagnosis Date    Acute pulmonary embolism     Back pain     Cancer     skin    Hyperlipidemia     Hypertension     Insomnia     PTSD (post-traumatic stress disorder)     no isssues when waking from anesthesia       Past Surgical History:   Procedure Laterality Date    BACK SURGERY      x2    COLONOSCOPY N/A 01/03/2023    Procedure: COLONOSCOPY WITH POLYPECTOMY;  Surgeon: Jose Hopson MD;  Location: [facility] ENDOSCOPY;  Service: General;  Laterality: N/A;  COLON POLYP, SUBOPTIMAL PREP    ENDOSCOPY N/A 01/03/2023    Procedure: ESOPHAGOGASTRODUODENOSCOPY WITH BIOPSIES, POLYPECTOMIES;  Surgeon: Jose Hopson MD;  Location: [facility] ENDOSCOPY;  Service: General;  Laterality: N/A;  HIATAL HERNIA, GASTRIC POLYPS    KNEE SURGERY Right     OTHER SURGICAL HISTORY      CANCER REMOVAL FACE    SHOULDER ARTHROSCOPY Right     SHOULDER ARTHROSCOPY W/ ROTATOR CUFF REPAIR Right 8/17/2023    Procedure: SHOULDER ARTHROSCOPY WITH SUBCROMIAL DECOMPRESSION AND DISTAL CLAVICULECTOMY, OPEN BICEPS TENODESIS;  Surgeon: Marek Gaviria MD;  Location: [facility] OR [facility];  Service: Orthopedics;  Laterality: Right;         Current Outpatient Medications:     apixaban (ELIQUIS) 5 MG tablet tablet, Take 1 tablet by mouth 2  (Two) Times a Day., Disp: , Rfl:     atorvastatin (LIPITOR) 20 MG tablet, Take 1 tablet by mouth Daily., Disp: , Rfl:     pantoprazole (PROTONIX) 20 MG EC tablet, Take 1 tablet by mouth 2 (Two) Times a Day., Disp: 180 tablet, Rfl: 6    traZODone (DESYREL) 100 MG tablet, Take 3 tablets by mouth Every Night., Disp: , Rfl:     No Known Allergies     Family History   Problem Relation Age of Onset    Malig Hyperthermia Neg Hx        Social History     Socioeconomic History    Marital status:    Tobacco Use    Smoking status: Never    Smokeless tobacco: Current     Types: Snuff   Vaping Use    Vaping Use: Never used   Substance and Sexual Activity    Alcohol use: Yes     Comment: RARELY    Drug use: Not Currently    Sexual activity: Defer        Physical Exam       No acute distress. Nonlabored breathing.    Result Review :               Assessment and Plan    There are no diagnoses linked to this encounter.    The [facility] his [facility] as I had prescribed. I will try and get in touch with his PCP and we will also try and [facility]. I do not see any reason why he can not take 20 mg of Protonix twice a day as I had prescribed and as the patient has been taking for an extended period of time 40 mg, which is a relatively standard dose of Protonix. Discussed with the patient. All questions were answered. He voiced understanding and agreed to proceed with the above plan.        Follow Up   No follow-ups on file.  Patient was given instructions and counseling regarding his condition or for health maintenance advice. Please see specific information pulled into the AVS if appropriate.       Transcribed from ambient dictation for Jose Hopson MD by Cookie Sevilla.  10/18/23   11:31 EDT    Patient or patient representative verbalized consent to the visit recording.  I have personally performed the services described in this document  as transcribed by the above individual, and it is both accurate and complete.

## 2023-10-20 ENCOUNTER — TREATMENT (OUTPATIENT)
Dept: PHYSICAL THERAPY | Facility: CLINIC | Age: 58
End: 2023-10-20
Payer: OTHER GOVERNMENT

## 2023-10-20 DIAGNOSIS — Z98.890 S/P ARTHROSCOPY OF RIGHT SHOULDER: Primary | ICD-10-CM

## 2023-10-20 DIAGNOSIS — M25.511 ACUTE PAIN OF RIGHT SHOULDER: ICD-10-CM

## 2023-10-20 DIAGNOSIS — R29.898 SHOULDER WEAKNESS: ICD-10-CM

## 2023-10-20 NOTE — PROGRESS NOTES
"Physical Therapy Daily Treatment Note  [address], [address] [place] [address]        Patient: Pavan Perales   : 1965  Diagnosis/ICD-10 Code:  S/P arthroscopy of right shoulder [Z98.890]  Referring practitioner: No ref. provider found  Date of Initial Visit: Type: THERAPY  Noted: 2023  Today's Date: 10/20/2023  Patient seen for 15 sessions             Subjective   Pavan Perales reports that his right shoulder has been feeling better.  He denied having any pain upon arrival today.    Objective     Active Range of Motion      Right Shoulder   Flexion: 140 degrees   Abduction: 141 degrees   External rotation 0°: 42 degrees (BTH;  base of skull)  Internal rotation BTB: T10     Strength/Myotome Testing      Right Shoulder :  External rotation at 0°: 4/5   Internal rotation at 0°: 4/5      HEP updated - Written / Verbal  Instruction given  MEDBRIDGE:  Access Code:  [id number]  Right Shoulder Theraband resisted   Extension:  Blue band  IR:  Blue band  ER:  Green band  Bilateral Lower Vs- Blue band  Blue/Green Therabands issued this date  Verbal cues given to keep movements slow and controlled and to not push into pain.          See Exercise and Manual Logs for complete treatment.       Assessment/Plan    Pt tolerated therapy session  well today with progression of therapeutic exercises, Functional activities, and Manual therapy. He has improved, but continues to have deficits in His Right Shoulder-Scapular ROM,  Strength, and Stability; limiting function and ability to perform all ADLs without pain at this time. He has made notable improvements in Right shoulder AROM/PROM. Most limited by reports of intermittent- \"Grinding\"- \"Catching\" in right shoulder with certain movements- exhibiting painful arc at times.  This however, has improved significantly after recent injection.  Frequent verbal cues/tactile cues given to improve form and scapular activation during exercise performance.   Mild tightness " noted today at end range of shoulder  IR/ER PROM.  Less reports of pain reported during exercise performance in clinic today.       Progress per Plan of Care- as tolerated - as per protocol- focusing on improving scapular strength and stability.                     Timed:  Manual Therapy:    12     mins  74290;  Therapeutic Exercise:    20     mins  07646;     Neuromuscular Flower:    0    mins  47171;    Therapeutic Activity:     8     mins  49299;     Gait Trainin     mins  90933;     Ultrasound:     0     mins  80538;    Electrical Stimulation:    0     mins  58153;  Iontophoresis     0     mins  29466    Untimed:  Electrical Stimulation:    0     mins  93647 ( );  Mechanical Traction:    0     mins  11490;   Fluidotherapy     0     mins  71423  Hot pack     0     mins  62439  Cold pack     0     mins  10179    Timed Treatment:   40   mins   Total Treatment:     40   mins        Ashlee Orantes PTA  Physical Therapist Assistant

## 2023-10-24 ENCOUNTER — TREATMENT (OUTPATIENT)
Dept: PHYSICAL THERAPY | Facility: CLINIC | Age: 58
End: 2023-10-24
Payer: OTHER GOVERNMENT

## 2023-10-24 DIAGNOSIS — M25.511 ACUTE PAIN OF RIGHT SHOULDER: ICD-10-CM

## 2023-10-24 DIAGNOSIS — Z98.890 S/P ARTHROSCOPY OF RIGHT SHOULDER: Primary | ICD-10-CM

## 2023-10-24 DIAGNOSIS — R29.898 SHOULDER WEAKNESS: ICD-10-CM

## 2023-10-24 PROCEDURE — 97530 THERAPEUTIC ACTIVITIES: CPT | Performed by: PHYSICAL THERAPIST

## 2023-10-24 PROCEDURE — 97110 THERAPEUTIC EXERCISES: CPT | Performed by: PHYSICAL THERAPIST

## 2023-10-24 PROCEDURE — 97140 MANUAL THERAPY 1/> REGIONS: CPT | Performed by: PHYSICAL THERAPIST

## 2023-10-24 NOTE — PROGRESS NOTES
"Physical Therapy Daily Treatment Note  [address], [address] [place] [address]        Patient: Pavan Perales   : 1965  Diagnosis/ICD-10 Code:  S/P arthroscopy of right shoulder [Z98.890]  Referring practitioner: No ref. provider found  Date of Initial Visit: Type: THERAPY  Noted: 2023  Today's Date: 10/24/2023  Patient seen for 16 sessions             Subjective   Pavan Perales reports that his arm feels stronger.  He does however, continue to report that he has popping-- grinding- and intermittent catching in  his right shoulder with certain movements.    Objective     Right:  Scapular Mobility: Fair  Scapular Strength: Fair    See Exercise and Manual Logs for complete treatment.       Assessment/Plan    Pt tolerated therapy session  well today with progression of therapeutic exercises, Functional activities, and Manual therapy. He has improved, but continues to have deficits in His Right Shoulder-Scapular ROM,  Strength, and Stability; limiting function and ability to perform all ADLs without pain at this time. He has made notable improvements in Right shoulder AROM/PROM. Most limited by reports of intermittent- \"Grinding\"- \"Catching\" in right shoulder with certain movements- exhibiting painful arc at times.  This however, has improved significantly after recent injection.  Frequent verbal cues/tactile cues given to improve form and scapular activation during exercise performance.   Mild tightness noted today at end range of shoulder  IR/ER PROM.  Less reports of pain reported during exercise performance in clinic today.       Progress per Plan of Care- as tolerated - as per protocol- focusing on improving scapular strength and stability.                     Timed:  Manual Therapy:    12     mins  45353;  Therapeutic Exercise:    20     mins  08464;     Neuromuscular Flower:    0    mins  86598;    Therapeutic Activity:     8     mins  36104;     Gait Trainin     mins  40941;   "   Ultrasound:     0     mins  88262;    Electrical Stimulation:    0     mins  63544;  Iontophoresis     0     mins  98820    Untimed:  Electrical Stimulation:    0     mins  53382 ( );  Mechanical Traction:    0     mins  17785;   Fluidotherapy     00     mins  78329  Hot pack     0     mins  08338  Cold pack     0     mins  27490    Timed Treatment:   40   mins   Total Treatment:     40   mins        Ashlee Orantes PTA  Physical Therapist Assistant

## 2023-10-27 ENCOUNTER — TREATMENT (OUTPATIENT)
Dept: PHYSICAL THERAPY | Facility: CLINIC | Age: 58
End: 2023-10-27
Payer: OTHER GOVERNMENT

## 2023-10-27 DIAGNOSIS — M25.511 ACUTE PAIN OF RIGHT SHOULDER: ICD-10-CM

## 2023-10-27 DIAGNOSIS — R29.898 SHOULDER WEAKNESS: ICD-10-CM

## 2023-10-27 DIAGNOSIS — Z98.890 S/P ARTHROSCOPY OF RIGHT SHOULDER: Primary | ICD-10-CM

## 2023-10-27 NOTE — PROGRESS NOTES
"Physical Therapy Daily Treatment Note  [address], [address] [place] [address]        Patient: Pavan Perales   : 1965  Diagnosis/ICD-10 Code:  S/P arthroscopy of right shoulder [Z98.890]  Referring practitioner: Marek Gaviria MD  Date of Initial Visit: Type: THERAPY  Noted: 2023  Today's Date: 10/27/2023  Patient seen for 17 sessions             Subjective   Pavan Perales reports that his right shoulder feels stronger.  He denied having any pain upon arrival.  He does report having \"Muscle Fatigue\"- stating that he  has been working is shoulder more with the bands at home.     Objective       See Exercise and Manual Logs for complete treatment.       Assessment/Plan    Pt tolerated therapy session well - with progression of therapeutic exercises, CKC-Functional activities, and Manual therapy. He has improved, but continues to have deficits in His Right Shoulder/Scapular ROM,  Strength, and Stability; limiting function and ability to perform ADLs without increased pain or difficulty at this time.  Less \"Catching\" reported during therapy session today.  Pt doing well with progressive strengthening with bands thus far.    Progress per Plan of Care - as tolerated.           Timed:  Manual Therapy:    10     mins  29580;  Therapeutic Exercise:    20     mins  75998;     Neuromuscular Flower:    0    mins  21725;    Therapeutic Activity:     8     mins  83706;     Gait Trainin     mins  94626;     Ultrasound:     0     mins  02727;    Electrical Stimulation:    0     mins  68757;  Iontophoresis     0     mins  62289    Untimed:  Electrical Stimulation:    0     mins  50462 ( );  Mechanical Traction:    0     mins  75878;   Fluidotherapy     0     mins  31340  Hot pack     00     mins  32406  Cold pack     0     mins  92216    Timed Treatment:   38   mins   Total Treatment:     38   mins        Ashlee Orantes PTA  Physical Therapist Assistant  "

## 2023-10-31 ENCOUNTER — TREATMENT (OUTPATIENT)
Dept: PHYSICAL THERAPY | Facility: CLINIC | Age: 58
End: 2023-10-31
Payer: OTHER GOVERNMENT

## 2023-10-31 DIAGNOSIS — R29.898 SHOULDER WEAKNESS: ICD-10-CM

## 2023-10-31 DIAGNOSIS — Z98.890 S/P ARTHROSCOPY OF RIGHT SHOULDER: Primary | ICD-10-CM

## 2023-10-31 DIAGNOSIS — M25.511 ACUTE PAIN OF RIGHT SHOULDER: ICD-10-CM

## 2023-10-31 NOTE — PROGRESS NOTES
"Physical Therapy Daily Treatment Note  [address], [address] [place] [address]        Patient: Pavan Perales   : 1965  Diagnosis/ICD-10 Code:  S/P arthroscopy of right shoulder [Z98.890]  Referring practitioner: No ref. provider found  Date of Initial Visit: Type: THERAPY  Noted: 2023  Today's Date: 10/31/2023  Patient seen for 18 sessions             Subjective   Pavan Perales reports having more \"Soreness\" versus pain.  He continues to report that he  is focusing on strengthening with his bands at home.    Objective     Right Shoulder PROM:  WFLs    See Exercise and Manual Logs for complete treatment.       Assessment/Plan    Pt tolerated therapy session well - with progression of therapeutic exercises, CKC-Functional activities, and Manual therapy. He has improved, but continues to have deficits in His Right Shoulder/Scapular ROM,  Strength, and Stability; limiting function and ability to perform ADLs without increased pain or difficulty at this time.  Less \"Catching\" reported during therapy session today.  Pt doing well with progressive strengthening with bands thus far.     Progress per Plan of Care - as tolerated.                   Timed:  Manual Therapy:    10     mins  67785;  Therapeutic Exercise:    20     mins  48694;     Neuromuscular Flower:    0    mins  95139;    Therapeutic Activity:     8     mins  11250;     Gait Trainin     mins  17111;     Ultrasound:     0     mins  34064;    Electrical Stimulation:    0     mins  99936;  Iontophoresis     0     mins  15468    Untimed:  Electrical Stimulation:    0     mins  08471 (MC );  Mechanical Traction:    0     mins  78470;   Fluidotherapy     0     mins  76840  Hot pack     0     mins  65402  Cold pack     0     mins  27606    Timed Treatment:   38   mins   Total Treatment:     38   mins        Ashlee Orantes PTA  Physical Therapist Assistant  "

## 2023-11-03 ENCOUNTER — TELEPHONE (OUTPATIENT)
Dept: PHYSICAL THERAPY | Facility: CLINIC | Age: 58
End: 2023-11-03

## 2023-11-07 ENCOUNTER — TREATMENT (OUTPATIENT)
Dept: PHYSICAL THERAPY | Facility: CLINIC | Age: 58
End: 2023-11-07
Payer: OTHER GOVERNMENT

## 2023-11-07 DIAGNOSIS — M25.511 ACUTE PAIN OF RIGHT SHOULDER: ICD-10-CM

## 2023-11-07 DIAGNOSIS — Z98.890 S/P ARTHROSCOPY OF RIGHT SHOULDER: Primary | ICD-10-CM

## 2023-11-07 DIAGNOSIS — R29.898 SHOULDER WEAKNESS: ICD-10-CM

## 2023-11-07 PROCEDURE — 97530 THERAPEUTIC ACTIVITIES: CPT | Performed by: PHYSICAL THERAPIST

## 2023-11-07 PROCEDURE — 97110 THERAPEUTIC EXERCISES: CPT | Performed by: PHYSICAL THERAPIST

## 2023-11-07 NOTE — PROGRESS NOTES
Progress note  [address] [facility] [address], [address] [place] [address]      Patient: Pavan Perales   : 1965  Diagnosis/ICD-10 Code:  S/P arthroscopy of right shoulder [Z98.890]  Referring practitioner: Marek Gaviria MD  Date of Initial Visit: Type: THERAPY  Noted: 2023  Today's Date: 2023  Patient seen for 19 sessions      Wk 11  Subjective:   Pvaan Perales reports: 1/10 R shoulder pain at beginning of session today.  Pt reports that at worst pain has been a 3/10 the past week.  Pt continues to report intermittent catching in R shoulder.  Pt reports that he is still limited in heavy lifting but otherwise is able to complete all other ADLs without significant pain or difficulty.  Subjective Questionnaire: UEFS: 74/80      Subjective   Objective   Active Range of Motion      Right Shoulder   Flexion: 150 degrees   Abduction: 155 degrees   External rotation 0°: 45 degrees   Internal rotation BTB: T8      Passive Range of Motion      Right Shoulder   Flexion: 168  Abduction: 166  External rotation 90°: 88  Internal rotation 90°: 83     Strength/Myotome Testing      Right Shoulder      Planes of Motion   Flexion: 4+   Extension: 5   Abduction: 4+  External rotation at 0°: 4+   Internal rotation at 0°: 5       Assessment & Plan       Assessment  Impairments: abnormal or restricted ROM, activity intolerance, impaired physical strength, lacks appropriate home exercise program and pain with function   Functional limitations: carrying objects, lifting, sleeping, pulling, pushing, uncomfortable because of pain, reaching behind back, reaching overhead and unable to perform repetitive tasks   Assessment details: Patient demonstrates improvements in R shoulder strength, ROM, scapular mobility and reports of pain/function compared to last progress note.  Pt has met most strength/ROM goals but is still most limited in R shoulder ER.  Pt continues to report intermittent pain limiting ADLs as shown on the UEFS.   Patient would continue to benefit from skilled PT services in order to address deficits limiting function at this time.      Goals  Plan Goals: 1. The patient has limited ROM of the R shoulder.    LTG 1: 12 weeks:  The patient will demonstrate 150 degrees of R shoulder flexion, 150 degrees of shoulder abduction, 60 degrees of shoulder external rotation, and  shoulder internal rotation to T10 to allow the patient to reach into upper kitchen cabinets and manipulate clothing behind the back with greater ease.    STATUS:  partially met, continue   STG 1a: 6 weeks:  The patient will demonstrate 120 degrees of R shoulder flexion, 120 degrees of shoulder abduction, 45 degrees of shoulder external rotation, and shoulder internal rotation to L2.    STATUS:  met    2. The patient has limited strength of the R shoulder.   LTG 2: 12 weeks:  The patient will demonstrate 4+ /5 strength for R shoulder flexion, abduction, external rotation, and internal rotation in order to demonstrate improved shoulder stability.    STATUS:  met   STG 2a: 6 weeks:  The patient will demonstrate 4-/5 strength for R shoulder flexion, abduction, external rotation, and internal rotation.    STATUS:  met   STG2b:  4 weeks:  The patient will be independent with home exercises.     STATUS:  ongoing     3. The patient complains of pain to the R shoulder.   LTG 3: 12 weeks:  The patient will report a pain rating of 2 /10 or better in order to improve sleep quality and tolerance to performance of activities of daily living.    STATUS:  ongoing   STG 3a: 6 weeks:  The patient will report a pain rating of 4 /10 or better.     STATUS:  met    4. Carrying, Moving, and Handling Objects Functional Limitation     LTG 4: 12 weeks:  The patient will demonstrate 1-19 % limitation by achieving a score of 65 on the UEFS.    STATUS:  met             STG 4: 12 weeks:  The patient will demonstrate 44 % limitation by achieving a score of 45 on the UEFS.    STATUS:   met         Plan  Therapy options: will be seen for skilled therapy services  Planned modality interventions: cryotherapy, TENS and thermotherapy (hydrocollator packs)  Planned therapy interventions: manual therapy, ADL retraining, neuromuscular re-education, body mechanics training, postural training, soft tissue mobilization, flexibility, spinal/joint mobilization, functional ROM exercises, strengthening, stretching, home exercise program, therapeutic activities, IADL retraining and joint mobilization  Frequency: 2x week  Duration in weeks: 4  Treatment plan discussed with: patient      Progress toward previous goals: Partially Met        Recommendations: Continue as planned  Timeframe: 1 month  Prognosis to achieve goals: good    PT SIGNATURE: Mary Jo Delgado, PT     Electronically signed 2023  DATE TREATMENT INITIATED: 2023  [place] License: [id number]      Based upon review of the patient's progress and continued therapy plan, it is my medical opinion that Pavan Perales should continue physical therapy treatment at [facility] PHYSICAL THERAPY  [address] [id number] [phone].      Timed:  Manual Therapy:    5     mins  91528;  Therapeutic Exercise:    10     mins  18067;     Neuromuscular Flower:    0    mins  01575;    Therapeutic Activity:     8     mins  60143;     Gait Trainin     mins  94697;     Ultrasound:     0     mins  53953;    Electrical Stimulation:    0     mins  61586 ( );    Untimed:  Electrical Stimulation:    0     mins  79603 ( );  Mechanical Traction:    0     mins  00525;     Timed Treatment:   23   mins   Total Treatment:     31   mins

## 2023-11-13 ENCOUNTER — DOCUMENTATION (OUTPATIENT)
Dept: PHYSICAL THERAPY | Facility: CLINIC | Age: 58
End: 2023-11-13
Payer: OTHER GOVERNMENT

## 2023-11-13 ENCOUNTER — OFFICE VISIT (OUTPATIENT)
Dept: ORTHOPEDIC SURGERY | Facility: CLINIC | Age: 58
End: 2023-11-13
Payer: OTHER GOVERNMENT

## 2023-11-13 VITALS — HEIGHT: 72 IN | BODY MASS INDEX: 27.63 KG/M2 | WEIGHT: 204 LBS

## 2023-11-13 DIAGNOSIS — Z47.89 AFTERCARE FOLLOWING SURGERY OF THE MUSCULOSKELETAL SYSTEM: Primary | ICD-10-CM

## 2023-11-13 PROCEDURE — 99024 POSTOP FOLLOW-UP VISIT: CPT | Performed by: PHYSICIAN ASSISTANT

## 2023-11-13 NOTE — PATIENT INSTRUCTIONS
Patient is doing very well. He feels ready for discharge from PT. Advised to continue home exercises.     Continue icing as needed up to 4 times daily for no longer than 15-20 mins at a time. Unable to take NSAIDs due to Eliquis. Rx for voltaren gel.     Follow-up on/after 1/2/24 for repeat steroid injection, if needed. Call with changes or concerns.

## 2023-11-13 NOTE — PROGRESS NOTES
Pt discharged per MD.  See last progress note for objective measurements and progress towards goals.

## 2023-11-13 NOTE — PROGRESS NOTES
"Chief Complaint  Follow-up and Pain of the Right Shoulder    Subjective      Pavan Perales presents to [facility] for follow-up of right shoulder arthroscopy with subacromial decompression, distal claviculectomy, and biceps tenodesis performed on 8/17/2023 by Dr. Gaviria.  Patient presents today for follow-up reporting continued improvement.  Feels he has regained full ROM and near full strength.  He feels ready for discharge from outpatient physical therapy.  Does report that he has had some continued pain, particularly with certain ROM, or while attempting to throw a ball with his grandson.  States he feels as if there is \"crunch in there or something binding\".  He did have significant improvement following steroid injection received in office on 10/2/2023 and is wondering when he can repeat this.    Objective   No Known Allergies    Vital Signs:   Ht 182.9 cm (72\")   Wt 92.5 kg (204 lb)   BMI 27.67 kg/m²       Physical Exam    Constitutional: Awake, alert. Well nourished appearance.    Integumentary: Warm, dry, intact. No obvious rashes.    HENT: Atraumatic, normocephalic.   Respiratory: Non labored respirations .   Cardiovascular: Intact peripheral pulses.    Psychiatric: Normal mood and affect. A&O X3    Ortho Exam  Right shoulder: Skin is warm, dry, and intact.  Well-healed surgical scars noted.  Full active shoulder forward flexion and abduction.  Internal rotation to mid thoracic.  Full flexion and extension of the wrist and elbow.  Full pronation and supination.  Patient is able to form a full fist.  Good thumb opposition.  Sensation intact light touch.  Distal neurovascular intact.    Imaging Results (Most Recent)       None                      Assessment and Plan   Problem List Items Addressed This Visit    None  Visit Diagnoses       S/p R shoulder arthroscopy with BINDU BENNETT, open biceps tenodesis    -  Primary            Follow Up   Return in about 7 weeks (around " 1/2/2024).    Tobacco Use: High Risk (11/13/2023)    Patient History     Smoking Tobacco Use: Never     Smokeless Tobacco Use: Current     Passive Exposure: Not on file     Patient is a non-smoker.  Did not discuss tobacco cessation options.    Patient Instructions   Patient is doing very well. He feels ready for discharge from PT. Advised to continue home exercises.     Continue icing as needed up to 4 times daily for no longer than 15-20 mins at a time. Unable to take NSAIDs due to Eliquis. Rx for voltaren gel.     Follow-up on/after 1/2/24 for repeat steroid injection, if needed. Call with changes or concerns.     Patient was given instructions and counseling regarding his condition or for health maintenance advice. Please see specific information pulled into the AVS if appropriate.

## 2023-11-28 ENCOUNTER — OFFICE VISIT (OUTPATIENT)
Dept: FAMILY MEDICINE CLINIC | Facility: CLINIC | Age: 58
End: 2023-11-28
Payer: OTHER GOVERNMENT

## 2023-11-28 VITALS
OXYGEN SATURATION: 100 % | DIASTOLIC BLOOD PRESSURE: 70 MMHG | HEIGHT: 72 IN | BODY MASS INDEX: 28.71 KG/M2 | WEIGHT: 212 LBS | TEMPERATURE: 97 F | SYSTOLIC BLOOD PRESSURE: 136 MMHG | HEART RATE: 99 BPM

## 2023-11-28 DIAGNOSIS — Z86.711 HISTORY OF PULMONARY EMBOLISM: Primary | ICD-10-CM

## 2023-11-28 DIAGNOSIS — M54.9 MECHANICAL BACK PAIN: ICD-10-CM

## 2023-11-28 RX ORDER — LIDOCAINE 50 MG/G
1 PATCH TOPICAL EVERY 24 HOURS
Qty: 15 EACH | Refills: 1 | Status: SHIPPED | OUTPATIENT
Start: 2023-11-28

## 2023-11-28 RX ORDER — PANTOPRAZOLE SODIUM 40 MG/1
1 TABLET, DELAYED RELEASE ORAL DAILY
COMMUNITY
Start: 2023-11-12

## 2023-11-28 NOTE — PROGRESS NOTES
"Chief Complaint    History of Pulmonary Embolism (Follow-up. Discuss stopping Eliquis)    Subjective      Pavan Perales presents to [facility]    History of Present Illness    1.) HX OF PE : Patient presents after recent seemingly provoked PE.  Onset of symptoms after shoulder surgery.  Patient presents after 3 months of use of Eliquis.  He denies any recent chest symptoms.  No lower extremity symptoms.  Primary complaint during this visit is acute on chronic low back pain.  Patient admits that he is considering an evaluation per a chiropractor.  Requesting Lidoderm patches, which he has used in the past with significant symptomatic improvement.    Objective     Vital Signs:     /70 (BP Location: Left arm, Patient Position: Sitting, Cuff Size: Adult)   Pulse 99   Temp 97 °F (36.1 °C) (Temporal)   Ht 182.9 cm (72\")   Wt 96.2 kg (212 lb)   SpO2 100%   BMI 28.75 kg/m²       Physical Exam  Vitals reviewed.   Constitutional:       General: He is not in acute distress.     Appearance: Normal appearance. He is well-developed.   HENT:      Head: Normocephalic and atraumatic.      Right Ear: Hearing and external ear normal.      Left Ear: Hearing and external ear normal.      Nose: Nose normal.   Eyes:      General: Lids are normal.         Right eye: No discharge.         Left eye: No discharge.      Conjunctiva/sclera: Conjunctivae normal.   Pulmonary:      Effort: Pulmonary effort is normal.   Abdominal:      General: There is no distension.   Musculoskeletal:         General: No swelling.      Cervical back: Neck supple.   Skin:     Coloration: Skin is not jaundiced.      Findings: No erythema.   Neurological:      Mental Status: He is alert. Mental status is at baseline.   Psychiatric:         Mood and Affect: Mood and affect normal.         Thought Content: Thought content normal.     Assessment and Plan     Diagnoses and all orders for this visit:    1. History of " pulmonary embolism (Primary)  Comments:  Eliquis discontinued.  Advised to call office with any alarming symptoms as discussed in room.    2. Mechanical back pain  Comments:  Lidoderm patches as needed.    Other orders  -     lidocaine (Lidoderm) 5 %; Place 1 patch on the skin as directed by provider Daily. Remove & Discard patch within 12 hours or as directed by MD  Dispense: 15 each; Refill: 1    Follow Up     Return in about 6 months (around 5/28/2024).    Patient was given instructions and counseling regarding his condition or for health maintenance advice. Please see specific information pulled into the AVS if appropriate.

## 2024-01-08 ENCOUNTER — OFFICE VISIT (OUTPATIENT)
Dept: ORTHOPEDIC SURGERY | Facility: CLINIC | Age: 59
End: 2024-01-08
Payer: OTHER GOVERNMENT

## 2024-01-08 VITALS
WEIGHT: 212.08 LBS | HEART RATE: 68 BPM | SYSTOLIC BLOOD PRESSURE: 127 MMHG | OXYGEN SATURATION: 97 % | BODY MASS INDEX: 28.73 KG/M2 | DIASTOLIC BLOOD PRESSURE: 83 MMHG | HEIGHT: 72 IN

## 2024-01-08 DIAGNOSIS — Z47.89 AFTERCARE FOLLOWING SURGERY OF THE MUSCULOSKELETAL SYSTEM: Primary | ICD-10-CM

## 2024-01-08 PROCEDURE — 99213 OFFICE O/P EST LOW 20 MIN: CPT | Performed by: PHYSICIAN ASSISTANT

## 2024-01-08 PROCEDURE — 20610 DRAIN/INJ JOINT/BURSA W/O US: CPT | Performed by: PHYSICIAN ASSISTANT

## 2024-01-08 RX ORDER — TRIAMCINOLONE ACETONIDE 40 MG/ML
40 INJECTION, SUSPENSION INTRA-ARTICULAR; INTRAMUSCULAR
Status: COMPLETED | OUTPATIENT
Start: 2024-01-08 | End: 2024-01-08

## 2024-01-08 RX ORDER — LIDOCAINE HYDROCHLORIDE 10 MG/ML
5 INJECTION, SOLUTION INFILTRATION; PERINEURAL
Status: COMPLETED | OUTPATIENT
Start: 2024-01-08 | End: 2024-01-08

## 2024-01-08 RX ADMIN — LIDOCAINE HYDROCHLORIDE 5 ML: 10 INJECTION, SOLUTION INFILTRATION; PERINEURAL at 10:51

## 2024-01-08 RX ADMIN — TRIAMCINOLONE ACETONIDE 40 MG: 40 INJECTION, SUSPENSION INTRA-ARTICULAR; INTRAMUSCULAR at 10:51

## 2024-01-08 NOTE — PATIENT INSTRUCTIONS
Overall, patient is doing very well from a right shoulder standpoint advised to continue participation in home exercise program.  Caution with overhead lifting, pushing, or pulling.  Patient elects to proceed with right shoulder steroid injection, administered in office today.  Advised on 3 months duration between injections.  Follow-up as needed.  Call changes or concerns.

## 2024-01-08 NOTE — PROGRESS NOTES
"Chief Complaint  Follow-up of the Right Shoulder    Subjective      Pavan Perales presents to [facility] for follow-up of right shoulder arthroscopy with subacromial decompression, distal claviculectomy, biceps tenodesis performed on 8/17/2023 by Dr. Gaviria.  He presents today for follow-up reporting that his shoulder is doing \"a lot better\".  States overall \"I can do what ever I need to\".  He has been discharged from outpatient physical therapy.  He does state that he has \"a little tightness to my shoulder\".  He would like to proceed with repeat right shoulder steroid injection in office today.    Objective   No Known Allergies    Vital Signs:   /83   Pulse 68   Ht 182.9 cm (72\")   Wt 96.2 kg (212 lb 1.3 oz)   SpO2 97%   BMI 28.76 kg/m²       Physical Exam    Constitutional: Awake, alert. Well nourished appearance.    Integumentary: Warm, dry, intact. No obvious rashes.    HENT: Atraumatic, normocephalic.   Respiratory: Non labored respirations .   Cardiovascular: Intact peripheral pulses.    Psychiatric: Normal mood and affect. A&O X3    Ortho Exam  Right shoulder: Skin is warm, dry, and intact.  Well-healed surgical scars noted.  Full active shoulder forward flexion and abduction.  Internal rotation to mid thoracic.  Full flexion and extension of the wrist and elbow.  Full pronation and supination.  Patient is able to form a full fist.  Good thumb opposition.  Sensation and distal neurovascular intact.    Imaging Results (Most Recent)       None             Large Joint Arthrocentesis: R subacromial bursa  Date/Time: 1/8/2024 10:51 AM  Consent given by: patient  Site marked: site marked  Timeout: Immediately prior to procedure a time out was called to verify the correct patient, procedure, equipment, support staff and site/side marked as required   Supporting Documentation  Indications: pain   Procedure Details  Location: shoulder - R subacromial bursa  Preparation: " Patient was prepped and draped in the usual sterile fashion  Needle gauge: 21 G.  Approach: posterior  Medications administered: 5 mL lidocaine 1 %; 40 mg triamcinolone acetonide 40 MG/ML  Patient tolerance: patient tolerated the procedure well with no immediate complications              Assessment and Plan   Problem List Items Addressed This Visit    None  Visit Diagnoses       S/p R shoulder arthroscopy with SAD, DC, open biceps tenodesis    -  Primary            Follow Up   No follow-ups on file.    Tobacco Use: High Risk (1/8/2024)    Patient History     Smoking Tobacco Use: Never     Smokeless Tobacco Use: Current     Passive Exposure: Not on file     Patient is a non-smoker.  Did not discuss tobacco cessation options.    Patient Instructions   Overall, patient is doing very well from a right shoulder standpoint advised to continue participation in home exercise program.  Caution with overhead lifting, pushing, or pulling.  Patient elects to proceed with right shoulder steroid injection, administered in office today.  Advised on 3 months duration between injections.  Follow-up as needed.  Call changes or concerns.  Patient was given instructions and counseling regarding his condition or for health maintenance advice. Please see specific information pulled into the AVS if appropriate.

## 2024-03-12 ENCOUNTER — APPOINTMENT (OUTPATIENT)
Dept: GENERAL RADIOLOGY | Facility: HOSPITAL | Age: 59
End: 2024-03-12
Payer: OTHER GOVERNMENT

## 2024-03-12 ENCOUNTER — HOSPITAL ENCOUNTER (EMERGENCY)
Facility: HOSPITAL | Age: 59
Discharge: HOME | End: 2024-03-12
Attending: EMERGENCY MEDICINE | Admitting: EMERGENCY MEDICINE
Payer: OTHER GOVERNMENT

## 2024-03-12 VITALS
OXYGEN SATURATION: 94 % | HEIGHT: 70 IN | DIASTOLIC BLOOD PRESSURE: 77 MMHG | RESPIRATION RATE: 16 BRPM | SYSTOLIC BLOOD PRESSURE: 111 MMHG | TEMPERATURE: 97.6 F | WEIGHT: 218.92 LBS | HEART RATE: 65 BPM | BODY MASS INDEX: 31.34 KG/M2

## 2024-03-12 DIAGNOSIS — B34.9 VIRAL SYNDROME: Primary | ICD-10-CM

## 2024-03-12 DIAGNOSIS — M54.9 ACUTE BACK PAIN, UNSPECIFIED BACK LOCATION, UNSPECIFIED BACK PAIN LATERALITY: ICD-10-CM

## 2024-03-12 LAB
ALBUMIN SERPL-MCNC: 3.6 G/DL (ref 3.5–5.2)
ALBUMIN/GLOB SERPL: 1.4 G/DL
ALP SERPL-CCNC: 103 U/L (ref 39–117)
ALT SERPL W P-5'-P-CCNC: 18 U/L (ref 1–41)
ANION GAP SERPL CALCULATED.3IONS-SCNC: 9.1 MMOL/L (ref 5–15)
AST SERPL-CCNC: 18 U/L (ref 1–40)
BASOPHILS # BLD AUTO: 0.02 10*3/MM3 (ref 0–0.2)
BASOPHILS NFR BLD AUTO: 0.5 % (ref 0–1.5)
BILIRUB SERPL-MCNC: 0.5 MG/DL (ref 0–1.2)
BUN SERPL-MCNC: 10 MG/DL (ref 6–20)
BUN/CREAT SERPL: 10.2 (ref 7–25)
CALCIUM SPEC-SCNC: 8.7 MG/DL (ref 8.6–10.5)
CHLORIDE SERPL-SCNC: 105 MMOL/L (ref 98–107)
CO2 SERPL-SCNC: 25.9 MMOL/L (ref 22–29)
CREAT SERPL-MCNC: 0.98 MG/DL (ref 0.76–1.27)
DEPRECATED RDW RBC AUTO: 41.4 FL (ref 37–54)
EGFRCR SERPLBLD CKD-EPI 2021: 88.8 ML/MIN/1.73
EOSINOPHIL # BLD AUTO: 0.14 10*3/MM3 (ref 0–0.4)
EOSINOPHIL NFR BLD AUTO: 3.2 % (ref 0.3–6.2)
ERYTHROCYTE [DISTWIDTH] IN BLOOD BY AUTOMATED COUNT: 13.1 % (ref 12.3–15.4)
FLUAV SUBTYP SPEC NAA+PROBE: NOT DETECTED
FLUBV RNA ISLT QL NAA+PROBE: NOT DETECTED
GLOBULIN UR ELPH-MCNC: 2.5 GM/DL
GLUCOSE SERPL-MCNC: 92 MG/DL (ref 65–99)
HCT VFR BLD AUTO: 40.2 % (ref 37.5–51)
HGB BLD-MCNC: 13.7 G/DL (ref 13–17.7)
HOLD SPECIMEN: NORMAL
HOLD SPECIMEN: NORMAL
IMM GRANULOCYTES # BLD AUTO: 0.01 10*3/MM3 (ref 0–0.05)
IMM GRANULOCYTES NFR BLD AUTO: 0.2 % (ref 0–0.5)
LYMPHOCYTES # BLD AUTO: 0.79 10*3/MM3 (ref 0.7–3.1)
LYMPHOCYTES NFR BLD AUTO: 17.8 % (ref 19.6–45.3)
MCH RBC QN AUTO: 29.7 PG (ref 26.6–33)
MCHC RBC AUTO-ENTMCNC: 34.1 G/DL (ref 31.5–35.7)
MCV RBC AUTO: 87 FL (ref 79–97)
MONOCYTES # BLD AUTO: 0.4 10*3/MM3 (ref 0.1–0.9)
MONOCYTES NFR BLD AUTO: 9 % (ref 5–12)
NEUTROPHILS NFR BLD AUTO: 3.08 10*3/MM3 (ref 1.7–7)
NEUTROPHILS NFR BLD AUTO: 69.3 % (ref 42.7–76)
NRBC BLD AUTO-RTO: 0 /100 WBC (ref 0–0.2)
NT-PROBNP SERPL-MCNC: <36 PG/ML (ref 0–900)
PLATELET # BLD AUTO: 178 10*3/MM3 (ref 140–450)
PMV BLD AUTO: 9.8 FL (ref 6–12)
POTASSIUM SERPL-SCNC: 4.1 MMOL/L (ref 3.5–5.2)
PROT SERPL-MCNC: 6.1 G/DL (ref 6–8.5)
RBC # BLD AUTO: 4.62 10*6/MM3 (ref 4.14–5.8)
RSV RNA NPH QL NAA+NON-PROBE: NOT DETECTED
SARS-COV-2 RNA RESP QL NAA+PROBE: NOT DETECTED
SODIUM SERPL-SCNC: 140 MMOL/L (ref 136–145)
TROPONIN T SERPL HS-MCNC: <6 NG/L
WBC NRBC COR # BLD AUTO: 4.44 10*3/MM3 (ref 3.4–10.8)
WHOLE BLOOD HOLD COAG: NORMAL
WHOLE BLOOD HOLD SPECIMEN: NORMAL

## 2024-03-12 PROCEDURE — 87637 SARSCOV2&INF A&B&RSV AMP PRB: CPT | Performed by: NURSE PRACTITIONER

## 2024-03-12 PROCEDURE — 99284 EMERGENCY DEPT VISIT MOD MDM: CPT

## 2024-03-12 PROCEDURE — 93005 ELECTROCARDIOGRAM TRACING: CPT

## 2024-03-12 PROCEDURE — 85025 COMPLETE CBC W/AUTO DIFF WBC: CPT

## 2024-03-12 PROCEDURE — 36415 COLL VENOUS BLD VENIPUNCTURE: CPT

## 2024-03-12 PROCEDURE — 25010000002 KETOROLAC TROMETHAMINE PER 15 MG: Performed by: EMERGENCY MEDICINE

## 2024-03-12 PROCEDURE — 80053 COMPREHEN METABOLIC PANEL: CPT | Performed by: EMERGENCY MEDICINE

## 2024-03-12 PROCEDURE — 96374 THER/PROPH/DIAG INJ IV PUSH: CPT

## 2024-03-12 PROCEDURE — 83880 ASSAY OF NATRIURETIC PEPTIDE: CPT

## 2024-03-12 PROCEDURE — 93005 ELECTROCARDIOGRAM TRACING: CPT | Performed by: EMERGENCY MEDICINE

## 2024-03-12 PROCEDURE — 84484 ASSAY OF TROPONIN QUANT: CPT | Performed by: EMERGENCY MEDICINE

## 2024-03-12 PROCEDURE — 71045 X-RAY EXAM CHEST 1 VIEW: CPT

## 2024-03-12 RX ORDER — HYDROCODONE BITARTRATE AND ACETAMINOPHEN 7.5; 325 MG/1; MG/1
1 TABLET ORAL ONCE
Status: COMPLETED | OUTPATIENT
Start: 2024-03-12 | End: 2024-03-12

## 2024-03-12 RX ORDER — SODIUM CHLORIDE 0.9 % (FLUSH) 0.9 %
10 SYRINGE (ML) INJECTION AS NEEDED
Status: DISCONTINUED | OUTPATIENT
Start: 2024-03-12 | End: 2024-03-12 | Stop reason: HOSPADM

## 2024-03-12 RX ORDER — KETOROLAC TROMETHAMINE 30 MG/ML
30 INJECTION, SOLUTION INTRAMUSCULAR; INTRAVENOUS ONCE
Status: COMPLETED | OUTPATIENT
Start: 2024-03-12 | End: 2024-03-12

## 2024-03-12 RX ORDER — KETOROLAC TROMETHAMINE 30 MG/ML
30 INJECTION, SOLUTION INTRAMUSCULAR; INTRAVENOUS ONCE
Status: DISCONTINUED | OUTPATIENT
Start: 2024-03-12 | End: 2024-03-12

## 2024-03-12 RX ADMIN — HYDROCODONE BITARTRATE AND ACETAMINOPHEN 1 TABLET: 7.5; 325 TABLET ORAL at 12:47

## 2024-03-12 RX ADMIN — KETOROLAC TROMETHAMINE 30 MG: 30 INJECTION, SOLUTION INTRAMUSCULAR; INTRAVENOUS at 12:47

## 2024-03-12 NOTE — DISCHARGE INSTRUCTIONS
Take Tylenol or ibuprofen as needed for body aches.  Stay hydrated and drink plenty of fluids.  Rest.  See your PCP for follow-up and return to ED for new or worsening symptoms.

## 2024-03-12 NOTE — Clinical Note
[facility] EMERGENCY ROOM  [address] [place] [address]  Phone: [phone]  Fax: [phone]    Pavan Perales was seen and treated in our emergency department on 3/12/2024.  He may return to work on 03/15/2024.         Thank you for choosing [facility].    Lamar Delgado APRN

## 2024-03-12 NOTE — ED PROVIDER NOTES
Time: 12:28 PM EDT  Date of encounter:  3/12/2024  Independent Historian/Clinical History and Information was obtained by:   Patient    History is limited by: N/A    Chief Complaint: body aches       History of Present Illness:  Patient is a 59 y.o. year old male who presents to the emergency department for evaluation of back pain, body aches, shortness of breath.  Patient states he had influenza a few weeks ago.  States for the past 3 days he has had back pain, body aches and feels like he cannot take a deep breath.  States he has had a dry cough.  Denies any fevers, nausea, vomiting, diarrhea.        Patient Care Team  Primary Care Provider: Maite Montoya DO    Past Medical History:     No Known Allergies  Past Medical History:   Diagnosis Date    Back pain     Cancer     skin    Hyperlipidemia     Hypertension     Insomnia     PTSD (post-traumatic stress disorder)     no isssues when waking from anesthesia    Tinnitus      Past Surgical History:   Procedure Laterality Date    BACK SURGERY      x2    COLONOSCOPY N/A 01/03/2023    Procedure: COLONOSCOPY WITH POLYPECTOMY;  Surgeon: Jose Hopson MD;  Location: [facility] ENDOSCOPY;  Service: General;  Laterality: N/A;  COLON POLYP, SUBOPTIMAL PREP    ENDOSCOPY N/A 01/03/2023    Procedure: ESOPHAGOGASTRODUODENOSCOPY WITH BIOPSIES, POLYPECTOMIES;  Surgeon: Jose Hopson MD;  Location: [facility] ENDOSCOPY;  Service: General;  Laterality: N/A;  HIATAL HERNIA, GASTRIC POLYPS    KNEE SURGERY Right     OTHER SURGICAL HISTORY      CANCER REMOVAL FACE    SHOULDER ARTHROSCOPY Right     SHOULDER ARTHROSCOPY W/ ROTATOR CUFF REPAIR Right 8/17/2023    Procedure: SHOULDER ARTHROSCOPY WITH SUBCROMIAL DECOMPRESSION AND DISTAL CLAVICULECTOMY, OPEN BICEPS TENODESIS;  Surgeon: Marek Gaviria MD;  [place] [facility] [place] [facility] [place]: [place]opedics;  Laterality: Right;     Family History   Problem Relation Age of Onset    Malig Hyperthermia Neg Hx        Home Medications:  Prior to  "Admission medications    Medication Sig Start Date End Date Taking? Authorizing Provider   atorvastatin (LIPITOR) 20 MG tablet Take 1 tablet by mouth Daily.    ProviderAnila MD   azithromycin (ZITHROMAX) 250 MG tablet Take 2 tablets p.o. daily on day 1 and then 1 tablet daily for the next 4 days. 2/17/24   Daria Suresh MD   Diclofenac Sodium (VOLTAREN) 1 % gel gel Apply 4 g topically to the appropriate area as directed 4 (Four) Times a Day As Needed (pain). 11/13/23   Ann Jiménez PA-C   lidocaine (Lidoderm) 5 % Place 1 patch on the skin as directed by provider Daily. Remove & Discard patch within 12 hours or as directed by MD 11/28/23   Maite Montoya DO   meclizine (ANTIVERT) 25 MG tablet Take 1 tablet by mouth 3 (Three) Times a Day As Needed for Dizziness. 2/17/24   Draia Suresh MD   pantoprazole (PROTONIX) 40 MG EC tablet Take 1 tablet by mouth Daily. 11/12/23   ProviderAnila MD   traZODone (DESYREL) 100 MG tablet Take 3 tablets by mouth Every Night.    Emergency, Nurse Epic, RN        Social History:   Social History     Tobacco Use    Smoking status: Never    Smokeless tobacco: Current     Types: Snuff   Vaping Use    Vaping status: Never Used   Substance Use Topics    Alcohol use: Yes     Comment: RARELY    Drug use: Not Currently         Review of Systems:  Review of Systems   Respiratory:  Positive for shortness of breath.    Musculoskeletal:  Positive for back pain and myalgias.        Physical Exam:  /77   Pulse 65   Temp 97.6 °F (36.4 °C) (Oral)   Resp 16   Ht 177.8 cm (70\")   Wt 99.3 kg (218 lb 14.7 oz)   SpO2 94%   BMI 31.41 kg/m²     Physical Exam  Vitals and nursing note reviewed.   Constitutional:       General: He is not in acute distress.     Appearance: Normal appearance. He is not toxic-appearing.   HENT:      Head: Normocephalic and atraumatic.      Nose: Nose normal.      Mouth/Throat:      Mouth: Mucous membranes are moist.   Eyes:      " Conjunctiva/sclera: Conjunctivae normal.   Cardiovascular:      Rate and Rhythm: Normal rate and regular rhythm.      Pulses: Normal pulses.      Heart sounds: Normal heart sounds.   Pulmonary:      Effort: Pulmonary effort is normal.      Breath sounds: Normal breath sounds.   Abdominal:      General: Bowel sounds are normal.      Palpations: Abdomen is soft.      Tenderness: There is no abdominal tenderness.   Musculoskeletal:         General: Normal range of motion.      Cervical back: Normal range of motion.   Skin:     General: Skin is warm and dry.   Neurological:      General: No focal deficit present.      Mental Status: He is alert and oriented to person, place, and time.   Psychiatric:         Mood and Affect: Mood normal.         Behavior: Behavior normal.         Thought Content: Thought content normal.         Judgment: Judgment normal.                    Medical Decision Making:      Comorbidities that affect care:    Chronic back pain, Coronary Artery Disease, Hypertension    External Notes reviewed:    Previous Clinic Note: Urgent care center note from 2/17/2024 patient diagnosed with influenza A, chest pain and lab nidus.  He was prescribed Zithromax, Antivert and Deltasone.      The following orders were placed and all results were independently analyzed by me:  Orders Placed This Encounter   Procedures    COVID PRE-OP / PRE-PROCEDURE SCREENING ORDER (NO ISOLATION) - Swab, Nasopharynx    COVID-19, FLU A/B, RSV PCR 1 HR TAT - Swab, Nasopharynx    XR Chest 1 View    [place] Draw    Comprehensive Metabolic Panel    BNP    Single High Sensitivity Troponin T    CBC Auto Differential    NPO Diet NPO Type: Strict NPO    Undress & Gown    Continuous Pulse Oximetry    Vital Signs    Oxygen Therapy- Nasal Cannula; Titrate 1-6 LPM Per SpO2; 90 - 95%    ECG 12 Lead ED Triage Standing Order; SOA    Insert Peripheral IV    CBC & Differential    Green Top (Gel)    Lavender Top    Gold Top - SST    Light Blue Top        Medications Given in the Emergency Department:  Medications   sodium chloride 0.9 % flush 10 mL (has no administration in time range)   HYDROcodone-acetaminophen (NORCO) 7.5-325 MG per tablet 1 tablet (has no administration in time range)   ketorolac (TORADOL) injection 30 mg (has no administration in time range)        ED Course:  1235: Patient ambulatory in ED without any difficulty.  Discussed ED findings with patient including labs, EKG, chest x-ray.  Discussed discharge plan.  Patient verbalized understanding agrees with plan.       Labs:    Lab Results (last 24 hours)       Procedure Component Value Units Date/Time    CBC & Differential [[id number]]  (Abnormal) Collected: 03/12/24 0938    Specimen: Blood Updated: 03/12/24 0949    Narrative:      The following orders were created for panel order CBC & Differential.  Procedure                               Abnormality         Status                     ---------                               -----------         ------                     CBC Auto Differential[[id number]]        Abnormal            Final result                 Please view results for these tests on the individual orders.    BNP [[id number]]  (Normal) Collected: 03/12/24 0938    Specimen: Blood Updated: 03/12/24 1012     proBNP <36.0 pg/mL     Narrative:      This assay is used as an aid in the diagnosis of individuals suspected of having heart failure. It can be used as an aid in the diagnosis of acute decompensated heart failure (ADHF) in patients presenting with signs and symptoms of ADHF to the emergency department (ED). In addition, NT-proBNP of <300 pg/mL indicates ADHF is not likely.    Age Range Result Interpretation  NT-proBNP Concentration (pg/mL:      <50             Positive            >450                   Gray                 300-450                    Negative             <300    50-75           Positive            >900                  Gray                300-900                   Negative            <300      >75             Positive            >1800                  Gray                300-1800                  Negative            <300    CBC Auto Differential [[id number]]  (Abnormal) Collected: 03/12/24 0938    Specimen: Blood Updated: 03/12/24 0949     WBC 4.44 10*3/mm3      RBC 4.62 10*6/mm3      Hemoglobin 13.7 g/dL      Hematocrit 40.2 %      MCV 87.0 fL      MCH 29.7 pg      MCHC 34.1 g/dL      RDW 13.1 %      RDW-SD 41.4 fl      MPV 9.8 fL      Platelets 178 10*3/mm3      Neutrophil % 69.3 %      Lymphocyte % 17.8 %      Monocyte % 9.0 %      Eosinophil % 3.2 %      Basophil % 0.5 %      Immature Grans % 0.2 %      Neutrophils, Absolute 3.08 10*3/mm3      Lymphocytes, Absolute 0.79 10*3/mm3      Monocytes, Absolute 0.40 10*3/mm3      Eosinophils, Absolute 0.14 10*3/mm3      Basophils, Absolute 0.02 10*3/mm3      Immature Grans, Absolute 0.01 10*3/mm3      nRBC 0.0 /100 WBC     Comprehensive Metabolic Panel [[id number]] Collected: 03/12/24 1058    Specimen: Blood Updated: 03/12/24 1156     Glucose 92 mg/dL      BUN 10 mg/dL      Creatinine 0.98 mg/dL      Sodium 140 mmol/L      Potassium 4.1 mmol/L      Chloride 105 mmol/L      CO2 25.9 mmol/L      Calcium 8.7 mg/dL      Total Protein 6.1 g/dL      Albumin 3.6 g/dL      ALT (SGPT) 18 U/L      AST (SGOT) 18 U/L      Alkaline Phosphatase 103 U/L      Total Bilirubin 0.5 mg/dL      Globulin 2.5 gm/dL      A/G Ratio 1.4 g/dL      BUN/Creatinine Ratio 10.2     Anion Gap 9.1 mmol/L      eGFR 88.8 mL/min/1.73     Narrative:      GFR Normal >60  Chronic Kidney Disease <60  Kidney Failure <15      Single High Sensitivity Troponin T [[id number]] Collected: 03/12/24 1058    Specimen: Blood Updated: 03/12/24 1101    COVID PRE-OP / PRE-PROCEDURE SCREENING ORDER (NO ISOLATION) - Swab, Nasopharynx [[id number]]  (Normal) Collected: 03/12/24 1111    Specimen: Swab from Nasopharynx Updated: 03/12/24 1221    Narrative:      The following orders were  created for panel order COVID PRE-OP / PRE-PROCEDURE SCREENING ORDER (NO ISOLATION) - Swab, Nasopharynx.  Procedure                               Abnormality         Status                     ---------                               -----------         ------                     COVID-19, FLU A/B, RSV P...[[id number]]  Normal              Final result                 Please view results for these tests on the individual orders.    COVID-19, FLU A/B, RSV PCR 1 HR TAT - Swab, Nasopharynx [[id number]]  (Normal) Collected: 03/12/24 1111    Specimen: Swab from Nasopharynx Updated: 03/12/24 1221     COVID19 Not Detected     Influenza A PCR Not Detected     Influenza B PCR Not Detected     RSV, PCR Not Detected    Narrative:      Fact sheet for providers: https://www.fda.gov/media/094366/download    Fact sheet for patients: https://www.fda.gov/media/061750/download    Test performed by PCR.             Imaging:    XR Chest 1 View    Result Date: 3/12/2024  PROCEDURE: XR CHEST 1 VW  COMPARISON: [facility], , XR CHEST 2 VW, 2/17/2024, 14:07.  INDICATIONS: SOA Triage Protocol  FINDINGS:  The cardiomediastinal silhouette is within normal limits. The lungs are clear. There is no focal consolidation, pneumothorax or large pleural effusion.  The right lateral costophrenic angle is not included within the field of view which partially limits assessment.        No acute process.       MIKE MCPHERSON MD       Electronically Signed and Approved By: MIKE MCPHERSON MD on 3/12/2024 at 9:48                Differential Diagnosis and Discussion:    Back Pain: The patient presents with back pain. My differential diagnosis includes but is not limited to acute spinal epidural abscess, acute spinal epidural bleed, cauda equina syndrome, abdominal aortic aneurysm, aortic dissection, kidney stone, pyelonephritis, musculoskeletal back pain, spinal fracture, and osteoarthritis.   Dyspnea: Differential diagnosis includes but is not  limited to metabolic acidosis, neurological disorders, psychogenic, asthma, pneumothorax, upper airway obstruction, COPD, pneumonia, noncardiogenic pulmonary edema, interstitial lung disease, anemia, congestive heart failure, and pulmonary embolism    All labs were reviewed and interpreted by me.  All X-rays impressions were independently interpreted by me.  EKG was interpreted by me.    MDM     Amount and/or Complexity of Data Reviewed  Clinical lab tests: reviewed  Tests in the radiology section of CPT®: reviewed  Tests in the medicine section of CPT®: reviewed               Patient Care Considerations:    ANTIBIOTICS: I considered prescribing antibiotics as an outpatient however no bacterial focus of infection was found.      Consultants/Shared Management Plan:    None    Social Determinants of Health:    Patient is independent, reliable, and has access to care.       Disposition and Care Coordination:    Discharged: The patient is suitable and stable for discharge with no need for consideration of admission.    I have explained discharge medications and the need for follow up with the patient/caretakers. This was also printed in the discharge instructions. Patient was discharged with the following medications and follow up:      Medication List        Stop      azithromycin 250 MG tablet  Commonly known as: ZITHROMAX     Diclofenac Sodium 1 % gel gel  Commonly known as: VOLTAREN     meclizine 25 MG tablet  Commonly known as: ANTIVERT           Maite Montoya, DO  [address] [place] [address] [phone]             Final diagnoses:   Viral syndrome   Acute back pain, unspecified back location, unspecified back pain laterality        ED Disposition       ED Disposition   Discharge    Condition   Stable    Comment   --               This medical record created using voice recognition software.             Lamar Delgado, APRN  03/12/24 1520

## 2024-03-14 RX ORDER — PANTOPRAZOLE SODIUM 40 MG/1
TABLET, DELAYED RELEASE ORAL DAILY
Qty: 30 TABLET | Refills: 10 | Status: SHIPPED | OUTPATIENT
Start: 2024-03-14

## 2024-03-18 LAB
QT INTERVAL: 401 MS
QTC INTERVAL: 426 MS

## 2024-05-26 ENCOUNTER — APPOINTMENT (OUTPATIENT)
Dept: GENERAL RADIOLOGY | Facility: HOSPITAL | Age: 59
End: 2024-05-26
Payer: OTHER GOVERNMENT

## 2024-05-26 ENCOUNTER — HOSPITAL ENCOUNTER (EMERGENCY)
Facility: HOSPITAL | Age: 59
Discharge: HOME | End: 2024-05-26
Attending: EMERGENCY MEDICINE | Admitting: EMERGENCY MEDICINE
Payer: OTHER GOVERNMENT

## 2024-05-26 VITALS
RESPIRATION RATE: 18 BRPM | HEIGHT: 70 IN | OXYGEN SATURATION: 97 % | SYSTOLIC BLOOD PRESSURE: 136 MMHG | BODY MASS INDEX: 31.41 KG/M2 | DIASTOLIC BLOOD PRESSURE: 86 MMHG | HEART RATE: 81 BPM | TEMPERATURE: 98.8 F

## 2024-05-26 DIAGNOSIS — J06.9 URI WITH COUGH AND CONGESTION: Primary | ICD-10-CM

## 2024-05-26 DIAGNOSIS — J01.90 ACUTE SINUSITIS, RECURRENCE NOT SPECIFIED, UNSPECIFIED LOCATION: ICD-10-CM

## 2024-05-26 LAB
FLUAV SUBTYP SPEC NAA+PROBE: NOT DETECTED
FLUBV RNA ISLT QL NAA+PROBE: NOT DETECTED
RSV RNA NPH QL NAA+NON-PROBE: NOT DETECTED
SARS-COV-2 RNA RESP QL NAA+PROBE: NOT DETECTED

## 2024-05-26 PROCEDURE — 99283 EMERGENCY DEPT VISIT LOW MDM: CPT

## 2024-05-26 PROCEDURE — 71045 X-RAY EXAM CHEST 1 VIEW: CPT

## 2024-05-26 PROCEDURE — 87637 SARSCOV2&INF A&B&RSV AMP PRB: CPT | Performed by: NURSE PRACTITIONER

## 2024-05-26 RX ORDER — AZITHROMYCIN 250 MG/1
TABLET, FILM COATED ORAL
Qty: 6 TABLET | Refills: 0 | Status: SHIPPED | OUTPATIENT
Start: 2024-05-26 | End: 2024-05-30

## 2024-05-26 RX ORDER — DEXTROMETHORPHAN HYDROBROMIDE AND PROMETHAZINE HYDROCHLORIDE 15; 6.25 MG/5ML; MG/5ML
5 SYRUP ORAL 4 TIMES DAILY PRN
Qty: 180 ML | Refills: 0 | Status: SHIPPED | OUTPATIENT
Start: 2024-05-26 | End: 2024-06-03

## 2024-05-26 RX ORDER — PREDNISONE 20 MG/1
60 TABLET ORAL DAILY
Qty: 15 TABLET | Refills: 0 | OUTPATIENT
Start: 2024-05-26 | End: 2024-05-31

## 2024-05-26 RX ORDER — ALBUTEROL SULFATE 90 UG/1
2 AEROSOL, METERED RESPIRATORY (INHALATION) EVERY 6 HOURS PRN
Qty: 18 G | Refills: 0 | Status: SHIPPED | OUTPATIENT
Start: 2024-05-26

## 2024-05-27 NOTE — ED NOTES
Pt discharged home, pt instructed on pharmacy and he verbalized understanding of discharge and follow up.

## 2024-05-27 NOTE — ED PROVIDER NOTES
Time: 8:31 PM EDT  Date of encounter:  5/26/2024  Independent Historian/Clinical History and Information was obtained by:   Patient    History is limited by: N/A    Chief Complaint: Cough, nasal congestion, chills      History of Present Illness:  Patient is a 59 y.o. year old male who presents to the emergency department for evaluation of multiple symptoms for 2 to 3 days.  Patient states he has a productive cough but is unsure what color the sputum is.  States at times he feels slightly short of breath and has some wheezing.  Complaining of generalized body ache with nasal congestion facial tenderness and pain.  No fever.  No known sick contacts or recent travel.  No abdominal pain nausea vomiting diarrhea.  No leg swelling or palpitations    HPI    Patient Care Team  Primary Care Provider: Maite Montoya DO    Past Medical History:     No Known Allergies  Past Medical History:   Diagnosis Date    Back pain     Cancer     skin    Hyperlipidemia     Hypertension     Insomnia     PTSD (post-traumatic stress disorder)     no isssues when waking from anesthesia    Tinnitus      Past Surgical History:   Procedure Laterality Date    BACK SURGERY      x2    COLONOSCOPY N/A 01/03/2023    Procedure: COLONOSCOPY WITH POLYPECTOMY;  Surgeon: Jose Hopson MD;  Location: [facility] ENDOSCOPY;  Service: General;  Laterality: N/A;  COLON POLYP, SUBOPTIMAL PREP    ENDOSCOPY N/A 01/03/2023    Procedure: ESOPHAGOGASTRODUODENOSCOPY WITH BIOPSIES, POLYPECTOMIES;  Surgeon: Jose Hopson MD;  Location: [facility] ENDOSCOPY;  Service: General;  Laterality: N/A;  HIATAL HERNIA, GASTRIC POLYPS    KNEE SURGERY Right     OTHER SURGICAL HISTORY      CANCER REMOVAL FACE    SHOULDER ARTHROSCOPY Right     SHOULDER ARTHROSCOPY W/ ROTATOR CUFF REPAIR Right 8/17/2023    Procedure: SHOULDER ARTHROSCOPY WITH SUBCROMIAL DECOMPRESSION AND DISTAL CLAVICULECTOMY, OPEN BICEPS TENODESIS;  Surgeon: Marek Gaviria MD;  Location: [facility] OR [facility] [place];  Service:  "Orthopedics;  Laterality: Right;     Family History   Problem Relation Age of Onset    Malig Hyperthermia Neg Hx        Home Medications:  Prior to Admission medications    Medication Sig Start Date End Date Taking? Authorizing Provider   atorvastatin (LIPITOR) 20 MG tablet Take 1 tablet by mouth Daily.    Provider, MD Anila   lidocaine (Lidoderm) 5 % Place 1 patch on the skin as directed by provider Daily. Remove & Discard patch within 12 hours or as directed by MD 11/28/23   Matie Montoya DO   pantoprazole (PROTONIX) 40 MG EC tablet TAKE 1 TABLET BY MOUTH DAILY 3/14/24   Jose Hopson MD   traZODone (DESYREL) 100 MG tablet Take 3 tablets by mouth Every Night.    Emergency, Nurse Epic, RN        Social History:   Social History     Tobacco Use    Smoking status: Never    Smokeless tobacco: Current     Types: Snuff   Vaping Use    Vaping status: Never Used   Substance Use Topics    Alcohol use: Yes     Comment: RARELY    Drug use: Not Currently         Review of Systems:  Review of Systems   Constitutional:  Negative for chills and fever.   HENT:  Positive for congestion and sinus pressure. Negative for ear pain and sore throat.    Respiratory:  Positive for cough, shortness of breath and wheezing.    Gastrointestinal:  Negative for diarrhea, nausea and vomiting.   Genitourinary:  Negative for dysuria.   Musculoskeletal:  Positive for myalgias.   Skin:  Negative for rash.   Neurological:  Positive for headaches.   Hematological: Negative.    Psychiatric/Behavioral: Negative.     All other systems reviewed and are negative.       Physical Exam:  /86 (BP Location: Right arm, Patient Position: Sitting)   Pulse 81   Temp 98.8 °F (37.1 °C) (Oral)   Resp 18   Ht 177.8 cm (70\")   SpO2 97%   BMI 31.41 kg/m²     Physical Exam  Vitals and nursing note reviewed.   Constitutional:       General: He is not in acute distress.     Appearance: Normal appearance. He is not toxic-appearing.   HENT:      Head: " Normocephalic and atraumatic.      Right Ear: Tympanic membrane, ear canal and external ear normal.      Left Ear: Tympanic membrane, ear canal and external ear normal.      Nose: Congestion present.      Mouth/Throat:      Mouth: Mucous membranes are moist.      Pharynx: Posterior oropharyngeal erythema present.   Eyes:      General: No scleral icterus.     Conjunctiva/sclera: Conjunctivae normal.   Cardiovascular:      Rate and Rhythm: Normal rate and regular rhythm.      Heart sounds: Normal heart sounds.   Pulmonary:      Effort: Pulmonary effort is normal. No respiratory distress.      Breath sounds: Normal breath sounds.   Abdominal:      General: Bowel sounds are normal.      Palpations: Abdomen is soft.      Tenderness: There is no abdominal tenderness.   Musculoskeletal:         General: Normal range of motion.      Cervical back: Normal range of motion and neck supple.      Right lower leg: No edema.      Left lower leg: No edema.   Skin:     General: Skin is warm and dry.      Findings: No rash.   Neurological:      Mental Status: He is alert and oriented to person, place, and time.   Psychiatric:         Mood and Affect: Mood normal.         Behavior: Behavior normal.              Medical Decision Making:      Comorbidities that affect care:    Cancer, Hypertension    External Notes reviewed:    Previous ED Note: Patient last seen in the ED on March 12 for viral syndrome and back pain      The following orders were placed and all results were independently analyzed by me:  Orders Placed This Encounter   Procedures    COVID-19, FLU A/B, RSV PCR 1 HR TAT - Swab, Nasopharynx    XR Chest 1 View       Medications Given in the Emergency Department:  Medications - No data to display     ED Course:         Labs:    Lab Results (last 24 hours)       Procedure Component Value Units Date/Time    COVID-19, FLU A/B, RSV PCR 1 HR TAT - Swab, Nasopharynx [[id number]]  (Normal) Collected: 05/26/24 2035    Specimen: Swab  from Nasopharynx Updated: 05/26/24 2128     COVID19 Not Detected     Influenza A PCR Not Detected     Influenza B PCR Not Detected     RSV, PCR Not Detected    Narrative:      Fact sheet for providers: https://www.fda.gov/media/453966/download    Fact sheet for patients: https://www.fda.gov/media/321879/download    Test performed by PCR.             Imaging:    XR Chest 1 View    Result Date: 5/26/2024  AP PORTABLE CHEST  HISTORY: Cough and shortness of air.  COMPARISON: 3/12/2024  TECHNIQUE: AP portable chest x-ray.  FINDINGS: Cardiac and mediastinal contours are normal. Pulmonary vascularity is normal. The lungs are clear. No pneumothorax is identified.      No acute cardiopulmonary findings.  Electronically Signed By-Dr. Hussain Sawant MD On:5/26/2024 9:17 PM         Differential Diagnosis and Discussion:    Cough: Differential diagnosis includes but is not limited to pneumonia, acute bronchitis, upper respiratory infection, ACE inhibitor use, allergic reaction, epiglottitis, seasonal allergies, chemical irritants, exercise-induced asthma, viral syndrome.  Dyspnea: Differential diagnosis includes but is not limited to metabolic acidosis, neurological disorders, psychogenic, asthma, pneumothorax, upper airway obstruction, COPD, pneumonia, noncardiogenic pulmonary edema, interstitial lung disease, anemia, congestive heart failure, and pulmonary embolism    All labs were reviewed and interpreted by me.  All X-rays impressions were independently interpreted by me.    MDM  Number of Diagnoses or Management Options  Acute sinusitis, recurrence not specified, unspecified location  URI with cough and congestion  Diagnosis management comments: The patient presents to the ED with a cough. The patient is resting comfortably and feels better, is alert and in no distress.  On re-examination the patient does not appear toxic and has no meningeal signs (including a negative Kernig and Brudzinski sign), and there's no  intractable vomiting, respiratory distress and no apparent pain. Based on the history, exam, diagnostic testing and reassessment, the patient has no signs of meningitis, significant pneumonia, pyelonephritis, sepsis or other acute serious bacterial infections, or other significant pathology to warrant further testing, continued ED treatment, admission or specialist evaluation.The patient's condition is stable and is appropriate for discharge. The patient´s symptoms are consistent with a viral upper respiratory infection and antibiotics are not indicated. The patient was counseled to return to the ED for re-evaluation for worsening cough, shortness of breath, uncontrollable headache, uncontrollable fever, altered mental status, or any symptoms which cause them concern. The patient will pursue further outpatient evaluation with the primary care physician or other designated or consultant physician as indicated in the discharge instructions.               Amount and/or Complexity of Data Reviewed  Clinical lab tests: reviewed and ordered  Tests in the radiology section of CPT®: reviewed and ordered    Risk of Complications, Morbidity, and/or Mortality  Presenting problems: low  Diagnostic procedures: low  Management options: low    Patient Progress  Patient progress: stable         Patient Care Considerations:    SEPSIS was considered but is NOT present in the emergency department as SIRS criteria is not present.      Consultants/Shared Management Plan:    None    Social Determinants of Health:    Patient is independent, reliable, and has access to care.       Disposition and Care Coordination:    Discharged: The patient is suitable and stable for discharge with no need for consideration of admission.    I have explained the patient´s condition, diagnoses and treatment plan based on the information available to me at this time. I have answered questions and addressed any concerns. The patient has a good  understanding  of the patient´s diagnosis, condition, and treatment plan as can be expected at this point. The vital signs have been stable. The patient´s condition is stable and appropriate for discharge from the emergency department.      The patient will pursue further outpatient evaluation with the primary care physician or other designated or consulting physician as outlined in the discharge instructions. They are agreeable to this plan of care and follow-up instructions have been explained in detail. The patient has received these instructions in written format and has expressed an understanding of the discharge instructions. The patient is aware that any significant change in condition or worsening of symptoms should prompt an immediate return to this or the closest emergency department or call to 911.  I have explained discharge medications and the need for follow up with the patient/caretakers. This was also printed in the discharge instructions. Patient was discharged with the following medications and follow up:      Medication List        New Prescriptions      albuterol sulfate  (90 Base) MCG/ACT inhaler  Commonly known as: PROVENTIL HFA;VENTOLIN HFA;PROAIR HFA  Inhale 2 puffs Every 6 (Six) Hours As Needed for Shortness of Air or Wheezing.     azithromycin 250 MG tablet  Commonly known as: ZITHROMAX  Take 2 tablets by mouth Daily for 1 day, THEN 1 tablet Daily for 4 days.  Start taking on: May 26, 2024     predniSONE 20 MG tablet  Commonly known as: DELTASONE  Take 3 tablets by mouth Daily for 5 days.     promethazine-dextromethorphan 6.25-15 MG/5ML syrup  Commonly known as: PROMETHAZINE-DM  Take 5 mL by mouth 4 (Four) Times a Day As Needed for Cough.               Where to Get Your Medications        These medications were sent to [facility]/pharmacy #[id number] - [place], [place] - [address] - [phone] [facility] [phone] FX  [address] [place] [address]      Hours: 24-hours Phone: [phone]    albuterol sulfate  (90 Base) MCG/ACT inhaler  azithromycin 250 MG tablet  predniSONE 20 MG tablet  promethazine-dextromethorphan 6.25-15 MG/5ML syrup      Maite Montoya  [address] [place] [address] [phone]    Schedule an appointment as soon as possible for a visit          Final diagnoses:   URI with cough and congestion   Acute sinusitis, recurrence not specified, unspecified location        ED Disposition       ED Disposition   Discharge    Condition   Stable    Comment   --               This medical record created using voice recognition software.             Flor Rodriguez, APRN  05/27/24 0344

## 2024-06-03 ENCOUNTER — TELEPHONE (OUTPATIENT)
Dept: FAMILY MEDICINE CLINIC | Facility: CLINIC | Age: 59
End: 2024-06-03

## 2024-06-03 ENCOUNTER — OFFICE VISIT (OUTPATIENT)
Dept: FAMILY MEDICINE CLINIC | Facility: CLINIC | Age: 59
End: 2024-06-03
Payer: OTHER GOVERNMENT

## 2024-06-03 VITALS
TEMPERATURE: 97.9 F | DIASTOLIC BLOOD PRESSURE: 74 MMHG | OXYGEN SATURATION: 98 % | HEIGHT: 70 IN | SYSTOLIC BLOOD PRESSURE: 138 MMHG | WEIGHT: 210 LBS | HEART RATE: 96 BPM | BODY MASS INDEX: 30.06 KG/M2

## 2024-06-03 DIAGNOSIS — J20.9 ACUTE BRONCHITIS, UNSPECIFIED ORGANISM: Primary | ICD-10-CM

## 2024-06-03 PROCEDURE — 99213 OFFICE O/P EST LOW 20 MIN: CPT | Performed by: FAMILY MEDICINE

## 2024-06-03 RX ORDER — PROMETHAZINE HYDROCHLORIDE AND CODEINE PHOSPHATE 6.25; 1 MG/5ML; MG/5ML
5 SOLUTION ORAL EVERY 8 HOURS PRN
Qty: 118 ML | Refills: 0 | Status: SHIPPED | OUTPATIENT
Start: 2024-06-03 | End: 2024-06-03

## 2024-06-03 NOTE — PROGRESS NOTES
"Chief Complaint    Hospital Follow Up Visit (Was seen at [facility] 5/24 for URI and again at [facility] 05/31 for bronchitis.  Continues to have symptoms.)    Subjective      Pavan Perales presents to [facility]    History of Present Illness    1.) ACUTE BRONCHITIS : Recent evaluation per ER in urgent care.  In the ER, patient was placed on azithromycin, prednisone and promethazine/dextromethorphan.  Respiratory testing was negative.  Recently reevaluated in urgent care.  Chest x-ray was negative.  Placed on steroid.  Received IM Rocephin.  Reports that he continues to experience a cough.  No fevers or chills.  No difficulty breathing.  No history of lung disease.    Objective     Vital Signs:     /74 (BP Location: Left arm, Patient Position: Sitting, Cuff Size: Adult)   Pulse 96   Temp 97.9 °F (36.6 °C) (Temporal)   Ht 177.8 cm (70\")   Wt 95.3 kg (210 lb)   SpO2 98%   BMI 30.13 kg/m²       Physical Exam  Vitals reviewed.   Constitutional:       General: He is not in acute distress.     Appearance: Normal appearance. He is well-developed.   HENT:      Head: Normocephalic and atraumatic.      Right Ear: Hearing and external ear normal.      Left Ear: Hearing and external ear normal.      Nose: Nose normal.   Eyes:      General: Lids are normal.         Right eye: No discharge.         Left eye: No discharge.      Conjunctiva/sclera: Conjunctivae normal.   Pulmonary:      Effort: Pulmonary effort is normal. No respiratory distress.      Breath sounds: No stridor. No wheezing, rhonchi or rales.   Abdominal:      General: There is no distension.   Musculoskeletal:         General: No swelling.      Cervical back: Neck supple.   Skin:     Coloration: Skin is not jaundiced.      Findings: No erythema.   Neurological:      Mental Status: He is alert. Mental status is at baseline.   Psychiatric:         Mood and Affect: Mood and affect normal.         Thought " Content: Thought content normal.     Assessment and Plan     Diagnoses and all orders for this visit:    1. Acute bronchitis, unspecified organism (Primary)  -     promethazine-codeine (PHENERGAN with CODEINE) 6.25-10 MG/5ML solution; Take 5 mL by mouth Every 8 (Eight) Hours As Needed for Cough.  Dispense: 118 mL; Refill: 0    Advised of likely viral process.  Physical exam unremarkable for any alarming findings.  Cough medication as noted.  Continue with supportive care including adequate fluids and rest.  Proper sleep and diet.  Advised regarding alarming symptoms and/or symptoms associated with pneumonia.  If any alarming symptoms, call office.    Follow Up : PRN    Patient was given instructions and counseling regarding his condition or for health maintenance advice. Please see specific information pulled into the AVS if appropriate.

## 2024-06-03 NOTE — TELEPHONE ENCOUNTER
Caller: [id number] - [place] - [address] AT [facility] [phone] [facility] [phone]     Relationship to patient: Pharmacy      Patient is needing: PHARMACY CALLED TO STATE THEY NO LONGER CARRY THE promethazine-codeine (PHENERGAN with CODEINE) 6.25-10 MG/5ML solution.    PHARMACY STATES THEY HAVE GUAIFENEFIN WITH CODINE.     PHARMACY STATES TO SEND A NEW ORDER IF THAT MEDICATION WILL WORK

## 2024-06-15 ENCOUNTER — HOSPITAL ENCOUNTER (EMERGENCY)
Facility: HOSPITAL | Age: 59
Discharge: HOME | End: 2024-06-15
Attending: EMERGENCY MEDICINE
Payer: OTHER GOVERNMENT

## 2024-06-15 ENCOUNTER — APPOINTMENT (OUTPATIENT)
Dept: GENERAL RADIOLOGY | Facility: HOSPITAL | Age: 59
End: 2024-06-15
Payer: OTHER GOVERNMENT

## 2024-06-15 VITALS
TEMPERATURE: 100.6 F | HEIGHT: 70 IN | OXYGEN SATURATION: 93 % | BODY MASS INDEX: 30.17 KG/M2 | RESPIRATION RATE: 20 BRPM | WEIGHT: 210.76 LBS | HEART RATE: 83 BPM | SYSTOLIC BLOOD PRESSURE: 117 MMHG | DIASTOLIC BLOOD PRESSURE: 69 MMHG
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
## 2024-06-15 DIAGNOSIS — J06.9 UPPER RESPIRATORY TRACT INFECTION, UNSPECIFIED TYPE: ICD-10-CM

## 2024-06-15 DIAGNOSIS — R50.9 FEBRILE ILLNESS: Primary | ICD-10-CM

## 2024-06-15 LAB
ALBUMIN SERPL-MCNC: 3.5 G/DL (ref 3.5–5.2)
ALBUMIN/GLOB SERPL: 1.5 G/DL
ALP SERPL-CCNC: 100 U/L (ref 39–117)
ALT SERPL W P-5'-P-CCNC: 47 U/L (ref 1–41)
ANION GAP SERPL CALCULATED.3IONS-SCNC: 10.2 MMOL/L (ref 5–15)
AST SERPL-CCNC: 43 U/L (ref 1–40)
BASOPHILS # BLD AUTO: 0.03 10*3/MM3 (ref 0–0.2)
BASOPHILS NFR BLD AUTO: 1.3 % (ref 0–1.5)
BILIRUB SERPL-MCNC: 0.5 MG/DL (ref 0–1.2)
BILIRUB UR QL STRIP: NEGATIVE
BUN SERPL-MCNC: 10 MG/DL (ref 6–20)
BUN/CREAT SERPL: 9 (ref 7–25)
CALCIUM SPEC-SCNC: 8.2 MG/DL (ref 8.6–10.5)
CHLORIDE SERPL-SCNC: 100 MMOL/L (ref 98–107)
CK MB SERPL-CCNC: 2.23 NG/ML
CK SERPL-CCNC: 229 U/L (ref 20–200)
CLARITY UR: CLEAR
CO2 SERPL-SCNC: 23.8 MMOL/L (ref 22–29)
COLOR UR: YELLOW
CREAT SERPL-MCNC: 1.11 MG/DL (ref 0.76–1.27)
D-LACTATE SERPL-SCNC: 1 MMOL/L (ref 0.5–2)
DEPRECATED RDW RBC AUTO: 40.7 FL (ref 37–54)
EGFRCR SERPLBLD CKD-EPI 2021: 76.5 ML/MIN/1.73
EOSINOPHIL # BLD AUTO: 0.06 10*3/MM3 (ref 0–0.4)
EOSINOPHIL NFR BLD AUTO: 2.6 % (ref 0.3–6.2)
ERYTHROCYTE [DISTWIDTH] IN BLOOD BY AUTOMATED COUNT: 12.8 % (ref 12.3–15.4)
FLUAV SUBTYP SPEC NAA+PROBE: NOT DETECTED
FLUBV RNA ISLT QL NAA+PROBE: NOT DETECTED
GLOBULIN UR ELPH-MCNC: 2.4 GM/DL
GLUCOSE SERPL-MCNC: 100 MG/DL (ref 65–99)
GLUCOSE UR STRIP-MCNC: NEGATIVE MG/DL
HCT VFR BLD AUTO: 38.2 % (ref 37.5–51)
HETEROPH AB SER QL LA: NEGATIVE
HGB BLD-MCNC: 12.9 G/DL (ref 13–17.7)
HGB UR QL STRIP.AUTO: NEGATIVE
IMM GRANULOCYTES # BLD AUTO: 0.01 10*3/MM3 (ref 0–0.05)
IMM GRANULOCYTES NFR BLD AUTO: 0.4 % (ref 0–0.5)
KETONES UR QL STRIP: NEGATIVE
LEUKOCYTE ESTERASE UR QL STRIP.AUTO: NEGATIVE
LYMPHOCYTES # BLD AUTO: 0.69 10*3/MM3 (ref 0.7–3.1)
LYMPHOCYTES NFR BLD AUTO: 30.1 % (ref 19.6–45.3)
MCH RBC QN AUTO: 29.5 PG (ref 26.6–33)
MCHC RBC AUTO-ENTMCNC: 33.8 G/DL (ref 31.5–35.7)
MCV RBC AUTO: 87.4 FL (ref 79–97)
MONOCYTES # BLD AUTO: 0.3 10*3/MM3 (ref 0.1–0.9)
MONOCYTES NFR BLD AUTO: 13.1 % (ref 5–12)
NEUTROPHILS NFR BLD AUTO: 1.2 10*3/MM3 (ref 1.7–7)
NEUTROPHILS NFR BLD AUTO: 52.5 % (ref 42.7–76)
NITRITE UR QL STRIP: NEGATIVE
NRBC BLD AUTO-RTO: 0 /100 WBC (ref 0–0.2)
PH UR STRIP.AUTO: 7.5 [PH] (ref 5–8)
PLATELET # BLD AUTO: 104 10*3/MM3 (ref 140–450)
PMV BLD AUTO: 9.3 FL (ref 6–12)
POTASSIUM SERPL-SCNC: 4.6 MMOL/L (ref 3.5–5.2)
PROT SERPL-MCNC: 5.9 G/DL (ref 6–8.5)
PROT UR QL STRIP: NEGATIVE
RBC # BLD AUTO: 4.37 10*6/MM3 (ref 4.14–5.8)
RSV RNA NPH QL NAA+NON-PROBE: NOT DETECTED
S PYO AG THROAT QL: NEGATIVE
SARS-COV-2 RNA RESP QL NAA+PROBE: NOT DETECTED
SODIUM SERPL-SCNC: 134 MMOL/L (ref 136–145)
SP GR UR STRIP: 1.01 (ref 1–1.03)
UROBILINOGEN UR QL STRIP: NORMAL
WBC NRBC COR # BLD AUTO: 2.29 10*3/MM3 (ref 3.4–10.8)

## 2024-06-15 PROCEDURE — 86308 HETEROPHILE ANTIBODY SCREEN: CPT | Performed by: NURSE PRACTITIONER

## 2024-06-15 PROCEDURE — 87484 EHRLICHA CHAFFEENSIS AMP PRB: CPT | Performed by: NURSE PRACTITIONER

## 2024-06-15 PROCEDURE — 96375 TX/PRO/DX INJ NEW DRUG ADDON: CPT

## 2024-06-15 PROCEDURE — 71045 X-RAY EXAM CHEST 1 VIEW: CPT

## 2024-06-15 PROCEDURE — 83605 ASSAY OF LACTIC ACID: CPT | Performed by: NURSE PRACTITIONER

## 2024-06-15 PROCEDURE — 36415 COLL VENOUS BLD VENIPUNCTURE: CPT

## 2024-06-15 PROCEDURE — 96374 THER/PROPH/DIAG INJ IV PUSH: CPT

## 2024-06-15 PROCEDURE — 82553 CREATINE MB FRACTION: CPT | Performed by: NURSE PRACTITIONER

## 2024-06-15 PROCEDURE — 87081 CULTURE SCREEN ONLY: CPT | Performed by: NURSE PRACTITIONER

## 2024-06-15 PROCEDURE — 87798 DETECT AGENT NOS DNA AMP: CPT | Performed by: NURSE PRACTITIONER

## 2024-06-15 PROCEDURE — 85025 COMPLETE CBC W/AUTO DIFF WBC: CPT | Performed by: NURSE PRACTITIONER

## 2024-06-15 PROCEDURE — 87040 BLOOD CULTURE FOR BACTERIA: CPT | Performed by: NURSE PRACTITIONER

## 2024-06-15 PROCEDURE — 25010000002 DEXAMETHASONE SODIUM PHOSPHATE 10 MG/ML SOLUTION: Performed by: NURSE PRACTITIONER

## 2024-06-15 PROCEDURE — 99283 EMERGENCY DEPT VISIT LOW MDM: CPT

## 2024-06-15 PROCEDURE — 82550 ASSAY OF CK (CPK): CPT | Performed by: NURSE PRACTITIONER

## 2024-06-15 PROCEDURE — 86618 LYME DISEASE ANTIBODY: CPT | Performed by: NURSE PRACTITIONER

## 2024-06-15 PROCEDURE — 87468 ANAPLSMA PHGCYTOPHLM AMP PRB: CPT | Performed by: NURSE PRACTITIONER

## 2024-06-15 PROCEDURE — 87637 SARSCOV2&INF A&B&RSV AMP PRB: CPT

## 2024-06-15 PROCEDURE — 80053 COMPREHEN METABOLIC PANEL: CPT | Performed by: NURSE PRACTITIONER

## 2024-06-15 PROCEDURE — 87880 STREP A ASSAY W/OPTIC: CPT | Performed by: NURSE PRACTITIONER

## 2024-06-15 PROCEDURE — 25010000002 KETOROLAC TROMETHAMINE PER 15 MG: Performed by: NURSE PRACTITIONER

## 2024-06-15 PROCEDURE — 81003 URINALYSIS AUTO W/O SCOPE: CPT | Performed by: NURSE PRACTITIONER

## 2024-06-15 RX ORDER — DOXYCYCLINE 100 MG/1
100 CAPSULE ORAL ONCE
Status: COMPLETED | OUTPATIENT
Start: 2024-06-15 | End: 2024-06-15

## 2024-06-15 RX ORDER — ACETAMINOPHEN 325 MG/1
650 TABLET ORAL ONCE
Status: COMPLETED | OUTPATIENT
Start: 2024-06-15 | End: 2024-06-15

## 2024-06-15 RX ORDER — KETOROLAC TROMETHAMINE 30 MG/ML
30 INJECTION, SOLUTION INTRAMUSCULAR; INTRAVENOUS ONCE
Status: COMPLETED | OUTPATIENT
Start: 2024-06-15 | End: 2024-06-15

## 2024-06-15 RX ORDER — DOXYCYCLINE 100 MG/1
100 CAPSULE ORAL 2 TIMES DAILY
Qty: 28 CAPSULE | Refills: 0 | Status: SHIPPED | OUTPATIENT
Start: 2024-06-15 | End: 2024-06-29

## 2024-06-15 RX ORDER — KETOROLAC TROMETHAMINE 10 MG/1
10 TABLET, FILM COATED ORAL EVERY 6 HOURS PRN
Qty: 20 TABLET | Refills: 0 | Status: SHIPPED | OUTPATIENT
Start: 2024-06-15 | End: 2024-07-01

## 2024-06-15 RX ORDER — DEXAMETHASONE SODIUM PHOSPHATE 10 MG/ML
10 INJECTION, SOLUTION INTRAMUSCULAR; INTRAVENOUS ONCE
Status: COMPLETED | OUTPATIENT
Start: 2024-06-15 | End: 2024-06-15

## 2024-06-15 RX ADMIN — KETOROLAC TROMETHAMINE 30 MG: 30 INJECTION, SOLUTION INTRAMUSCULAR; INTRAVENOUS at 21:28

## 2024-06-15 RX ADMIN — ACETAMINOPHEN 650 MG: 325 TABLET ORAL at 21:28

## 2024-06-15 RX ADMIN — DOXYCYCLINE 100 MG: 100 CAPSULE ORAL at 21:28

## 2024-06-15 RX ADMIN — DEXAMETHASONE SODIUM PHOSPHATE 10 MG: 10 INJECTION INTRAMUSCULAR; INTRAVENOUS at 21:28

## 2024-06-15 NOTE — ED PROVIDER NOTES
Time: 6:20 PM EDT  Date of encounter:  6/15/2024  Independent Historian/Clinical History and Information was obtained by:   Patient    History is limited by: N/A    Chief Complaint: Cough      History of Present Illness:  Patient is a 59 y.o. year old male who presents to the emergency department for evaluation of cough, fever and bodyaches and chills that worsened last night.  Patient states he has had symptoms like this for the past 6 weeks without any relief despite multiple treatment.  Patient states he had a fever PTA but has not taken any Tylenol/ ibuprofen today.  Was seen by his PCP earlier this month and diagnosed with bronchitis before that URI.  Was placed on antibiotic most recently for right ear infection and he is been on it for about 4 to 5 days.  It is amoxicillin.  Patient states his cough is still present but it has gotten improved.  Originally he was having shortness of breath and wheezing which is gone.  States his fever today was 101 prior to arrival.  Patient does report recent tick bites in the last few weeks as well.  No rash.  No nausea vomiting diarrhea or abdominal pain.  No flank pain or dysuria.  No known sick contacts    HPI    Patient Care Team  Primary Care Provider: Maite Montoya DO    Past Medical History:     No Known Allergies  Past Medical History:   Diagnosis Date    Back pain     Cancer     skin    Hyperlipidemia     Hypertension     Insomnia     PTSD (post-traumatic stress disorder)     no isssues when waking from anesthesia    Tinnitus      Past Surgical History:   Procedure Laterality Date    BACK SURGERY      x2    COLONOSCOPY N/A 01/03/2023    Procedure: COLONOSCOPY WITH POLYPECTOMY;  Surgeon: Jose Hopson MD;  Location: [facility] ENDOSCOPY;  Service: General;  Laterality: N/A;  COLON POLYP, SUBOPTIMAL PREP    ENDOSCOPY N/A 01/03/2023    Procedure: ESOPHAGOGASTRODUODENOSCOPY WITH BIOPSIES, POLYPECTOMIES;  Surgeon: Jose Hopson MD;  Location: [facility] ENDOSCOPY;   Service: General;  Laterality: N/A;  HIATAL HERNIA, GASTRIC POLYPS    KNEE SURGERY Right     OTHER SURGICAL HISTORY      CANCER REMOVAL FACE    SHOULDER ARTHROSCOPY Right     SHOULDER ARTHROSCOPY W/ ROTATOR CUFF REPAIR Right 8/17/2023    Procedure: SHOULDER ARTHROSCOPY WITH SUBCROMIAL DECOMPRESSION AND DISTAL CLAVICULECTOMY, OPEN BICEPS TENODESIS;  Surgeon: Marek Gaviria MD;  Location[facility];  Service: Orthopedics;  Laterality: Right;     Family History   Problem Relation Age of Onset    Malig Hyperthermia Neg Hx        Home Medications:  Prior to Admission medications    Medication Sig Start Date End Date Taking? Authorizing Provider   albuterol sulfate  (90 Base) MCG/ACT inhaler Inhale 2 puffs Every 6 (Six) Hours As Needed for Shortness of Air or Wheezing. 5/26/24   Flor Rodriguez APRN   atorvastatin (LIPITOR) 20 MG tablet Take 1 tablet by mouth Daily.    Provider, MD Anila   guaiFENesin-codeine (ROMILAR-AC) 100-10 MG/5ML solution/syrup TAKE 15 ML BY MOUTH EVERY 6 HOURS AS NEEDED FOR COUGH 6/3/24   ProviderAnila MD   lidocaine (Lidoderm) 5 % Place 1 patch on the skin as directed by provider Daily. Remove & Discard patch within 12 hours or as directed by MD 11/28/23   Maite Montoya,    pantoprazole (PROTONIX) 40 MG EC tablet TAKE 1 TABLET BY MOUTH DAILY 3/14/24   Jose Hopson MD   traZODone (DESYREL) 100 MG tablet Take 3 tablets by mouth Every Night.    Emergency, Nurse Epic, RN        Social History:   Social History     Tobacco Use    Smoking status: Never    Smokeless tobacco: Current     Types: Snuff   Vaping Use    Vaping status: Never Used   Substance Use Topics    Alcohol use: Yes     Comment: RARELY    Drug use: Not Currently         Review of Systems:  Review of Systems   Constitutional:  Positive for chills and fever.   HENT:  Positive for ear pain (On antibiotic currently for ear infection). Negative for congestion, rhinorrhea, sinus pressure, sinus pain, sneezing  "and sore throat.    Eyes:  Negative for discharge.   Respiratory:  Positive for cough. Negative for shortness of breath and wheezing.    Cardiovascular:  Negative for palpitations.   Gastrointestinal:  Negative for abdominal pain, diarrhea, nausea and vomiting.   Genitourinary:  Negative for dysuria and flank pain.   Musculoskeletal:  Positive for arthralgias (Neurolysed) and myalgias. Negative for back pain and neck pain.   Skin:  Negative for rash.   Neurological: Negative.    Hematological: Negative.    Psychiatric/Behavioral: Negative.          Physical Exam:  /69 (BP Location: Right arm, Patient Position: Lying)   Pulse 83   Temp (!) 100.6 °F (38.1 °C) (Oral)   Resp 20   Ht 177.8 cm (70\")   Wt 95.6 kg (210 lb 12.2 oz)   SpO2 93%   BMI 30.24 kg/m²     Physical Exam  Vitals and nursing note reviewed.   Constitutional:       General: He is not in acute distress.     Appearance: Normal appearance. He is not toxic-appearing.   HENT:      Head: Normocephalic and atraumatic.      Right Ear: Tympanic membrane, ear canal and external ear normal.      Left Ear: Tympanic membrane, ear canal and external ear normal.      Nose: Nose normal.      Mouth/Throat:      Mouth: Mucous membranes are moist.   Eyes:      General: No scleral icterus.     Extraocular Movements: Extraocular movements intact.      Conjunctiva/sclera: Conjunctivae normal.      Pupils: Pupils are equal, round, and reactive to light.   Cardiovascular:      Rate and Rhythm: Normal rate and regular rhythm.      Heart sounds: Normal heart sounds.   Pulmonary:      Effort: Pulmonary effort is normal. No respiratory distress.      Breath sounds: Normal breath sounds.   Abdominal:      General: Bowel sounds are normal. There is no distension.      Palpations: Abdomen is soft.      Tenderness: There is no abdominal tenderness. There is no right CVA tenderness or left CVA tenderness.   Musculoskeletal:         General: Normal range of motion.      " Cervical back: Normal range of motion and neck supple. No rigidity.   Lymphadenopathy:      Cervical: No cervical adenopathy.   Skin:     General: Skin is warm and dry.      Coloration: Skin is not cyanotic.      Findings: No rash.   Neurological:      Mental Status: He is alert and oriented to person, place, and time.   Psychiatric:         Attention and Perception: Attention and perception normal.         Mood and Affect: Mood normal.         Behavior: Behavior normal.              Medical Decision Making:      Comorbidities that affect care:    Cancer, Hypertension    External Notes reviewed:    Previous Clinic Note: Patient most recently seen at urgent care on the 11th for foreign body of right hand patient had a splinter under his fingernail that had to be removed.  Prior to that he went to urgent care and was diagnosed with acute bronchitis on 3 Lise      The following orders were placed and all results were independently analyzed by me:  Orders Placed This Encounter   Procedures    COVID-19, FLU A/B, RSV PCR 1 HR TAT - Swab, Nasopharynx    Blood Culture - Blood,    Blood Culture - Blood,    Rapid Strep A Screen - Swab, Throat    Beta Strep Culture, Throat - Swab, Throat    XR Chest 1 View    Comprehensive Metabolic Panel    Lactic Acid, Plasma    Ehrlichia Profile DNA PCR    Rickettsia Species DNA, Real-Time PCR    Lyme Disease Total Antibody With Reflex to Immunoassay    Mononucleosis Screen    CBC Auto Differential    CK Total & CKMB    Urinalysis With Microscopic If Indicated (No Culture) - Urine, Clean Catch    CBC & Differential       Medications Given in the Emergency Department:  Medications   ketorolac (TORADOL) injection 30 mg (30 mg Intravenous Given 6/15/24 2128)   dexAMETHasone sodium phosphate injection 10 mg (10 mg Intravenous Given 6/15/24 2128)   doxycycline (MONODOX) capsule 100 mg (100 mg Oral Given 6/15/24 2128)   acetaminophen (TYLENOL) tablet 650 mg (650 mg Oral Given 6/15/24 2128)         ED Course:    ED Course as of 06/15/24 2242   Sat Vu 15, 2024   1821 --- PROVIDER IN TRIAGE NOTE ---    The patient was evaluated by David zuluaga in triage. Orders were placed and the patient is currently awaiting disposition.    [AJ]   2045 XR Chest 1 View  No acute infiltrate [DS]      ED Course User Index  [AJ] David Ramos PA-C  [DS] Flor Rodriguez APRN       Labs:    Lab Results (last 24 hours)       Procedure Component Value Units Date/Time    COVID-19, FLU A/B, RSV PCR 1 HR TAT - Swab, Nasopharynx [[id number]]  (Normal) Collected: 06/15/24 1944    Specimen: Swab from Nasopharynx Updated: 06/15/24 2036     COVID19 Not Detected     Influenza A PCR Not Detected     Influenza B PCR Not Detected     RSV, PCR Not Detected    Narrative:      Fact sheet for providers: https://www.fda.gov/media/168738/download    Fact sheet for patients: https://www.fda.gov/media/856966/download    Test performed by PCR.    CBC & Differential [[id number]]  (Abnormal) Collected: 06/15/24 2056    Specimen: Blood Updated: 06/15/24 2108    Narrative:      The following orders were created for panel order CBC & Differential.  Procedure                               Abnormality         Status                     ---------                               -----------         ------                     CBC Auto Differential[[id number]]        Abnormal            Final result                 Please view results for these tests on the individual orders.    Comprehensive Metabolic Panel [[id number]]  (Abnormal) Collected: 06/15/24 2056    Specimen: Blood Updated: 06/15/24 2159     Glucose 100 mg/dL      BUN 10 mg/dL      Creatinine 1.11 mg/dL      Sodium 134 mmol/L      Potassium 4.6 mmol/L      Comment: Slight hemolysis detected by analyzer. Result may be falsely elevated.        Chloride 100 mmol/L      CO2 23.8 mmol/L      Calcium 8.2 mg/dL      Total Protein 5.9 g/dL      Albumin 3.5 g/dL      ALT (SGPT) 47 U/L      AST (SGOT)  43 U/L      Comment: Slight hemolysis detected by analyzer. Result may be falsely elevated.        Alkaline Phosphatase 100 U/L      Total Bilirubin 0.5 mg/dL      Globulin 2.4 gm/dL      A/G Ratio 1.5 g/dL      BUN/Creatinine Ratio 9.0     Anion Gap 10.2 mmol/L      eGFR 76.5 mL/min/1.73     Narrative:      GFR Normal >60  Chronic Kidney Disease <60  Kidney Failure <15      Blood Culture - Blood, Arm, Left [[id number]] Collected: 06/15/24 2056    Specimen: Blood from Arm, Left Updated: 06/15/24 2105    Lactic Acid, Plasma [[id number]]  (Normal) Collected: 06/15/24 2056    Specimen: Blood Updated: 06/15/24 2133     Lactate 1.0 mmol/L     Ehrlichia Profile DNA PCR [[id number]] Collected: 06/15/24 2056    Specimen: Blood Updated: 06/15/24 2105    Rickettsia Species DNA, Real-Time PCR [[id number]] Collected: 06/15/24 2056    Specimen: Blood Updated: 06/15/24 2105    Lyme Disease Total Antibody With Reflex to Immunoassay [[id number]] Collected: 06/15/24 2056    Specimen: Blood Updated: 06/15/24 2105    Mononucleosis Screen [[id number]]  (Normal) Collected: 06/15/24 2056    Specimen: Blood Updated: 06/15/24 2124     Monospot Negative    CBC Auto Differential [[id number]]  (Abnormal) Collected: 06/15/24 2056    Specimen: Blood Updated: 06/15/24 2108     WBC 2.29 10*3/mm3      RBC 4.37 10*6/mm3      Hemoglobin 12.9 g/dL      Hematocrit 38.2 %      MCV 87.4 fL      MCH 29.5 pg      MCHC 33.8 g/dL      RDW 12.8 %      RDW-SD 40.7 fl      MPV 9.3 fL      Platelets 104 10*3/mm3      Neutrophil % 52.5 %      Lymphocyte % 30.1 %      Monocyte % 13.1 %      Eosinophil % 2.6 %      Basophil % 1.3 %      Immature Grans % 0.4 %      Neutrophils, Absolute 1.20 10*3/mm3      Lymphocytes, Absolute 0.69 10*3/mm3      Monocytes, Absolute 0.30 10*3/mm3      Eosinophils, Absolute 0.06 10*3/mm3      Basophils, Absolute 0.03 10*3/mm3      Immature Grans, Absolute 0.01 10*3/mm3      nRBC 0.0 /100 WBC     CK Total & CKMB [[id number]]  (Abnormal)  Collected: 06/15/24 2056    Specimen: Blood Updated: 06/15/24 2138     CKMB 2.23 ng/mL      Creatine Kinase 229 U/L     Narrative:      CKMB results may be falsely decreased if patient taking Biotin.    Blood Culture - Blood, Arm, Left [[id number]] Collected: 06/15/24 2121    Specimen: Blood from Arm, Left Updated: 06/15/24 2124    Rapid Strep A Screen - Swab, Throat [[id number]]  (Normal) Collected: 06/15/24 2131    Specimen: Swab from Throat Updated: 06/15/24 2200     Strep A Ag Negative    Urinalysis With Microscopic If Indicated (No Culture) - Urine, Clean Catch [[id number]]  (Normal) Collected: 06/15/24 2131    Specimen: Urine, Clean Catch Updated: 06/15/24 2143     Color, UA Yellow     Appearance, UA Clear     pH, UA 7.5     Specific Gravity, UA 1.010     Glucose, UA Negative     Ketones, UA Negative     Bilirubin, UA Negative     Blood, UA Negative     Protein, UA Negative     Leuk Esterase, UA Negative     Nitrite, UA Negative     Urobilinogen, UA 1.0 E.U./dL    Narrative:      Urine microscopic not indicated.    Beta Strep Culture, Throat - Swab, Throat [[id number]] Collected: 06/15/24 2131    Specimen: Swab from Throat Updated: 06/15/24 2200             Imaging:    XR Chest 1 View    Result Date: 6/15/2024  XR CHEST 1 VW Date of Exam: 6/15/2024 6:40 PM EDT Indication: cough Comparison: 5/31/2024 Findings: Low lung volumes. Heart size and pulmonary vasculature are normal when accounting for lung volumes. Symmetric strandy opacities in the lung bases. No asymmetric infiltrate or edema     Impression: Bibasilar atelectasis due to incomplete inspiration. No suspicious infiltrate Electronically Signed: Ken Wood  6/15/2024 6:58 PM EDT  Workstation ID: [id number]       Differential Diagnosis and Discussion:    Fever: Based on the complaint of fever, differential diagnosis includes but is not limited to meningitis, pneumonia, pyelonephritis, acute uti,  systemic immune response syndrome, sepsis, viral syndrome,  fungal infection, tick born illness and other bacterial infections.    All labs were reviewed and interpreted by me.  All X-rays impressions were independently interpreted by me.    MDM  Number of Diagnoses or Management Options  Febrile illness  Tick bite, unspecified site, initial encounter  Upper respiratory tract infection, unspecified type  Diagnosis management comments: I have explained the patient´s condition, diagnoses and treatment plan based on the information available to me at this time. I have answered questions and addressed any concerns. The patient has a good  understanding of the patient´s diagnosis, condition, and treatment plan as can be expected at this point. The vital signs have been stable. The patient´s condition is stable and appropriate for discharge from the emergency department.      The patient will pursue further outpatient evaluation with the primary care physician or other designated or consulting physician as outlined in the discharge instructions. They are agreeable to this plan of care and follow-up instructions have been explained in detail. The patient has received these instructions in written format and have expressed an understanding of the discharge instructions. The patient is aware that any significant change in condition or worsening of symptoms should prompt an immediate return to this or the closest emergency department or call to 911.       Amount and/or Complexity of Data Reviewed  Clinical lab tests: reviewed and ordered  Tests in the radiology section of CPT®: reviewed and ordered  Tests in the medicine section of CPT®: ordered and reviewed  Decide to obtain previous medical records or to obtain history from someone other than the patient: yes    Risk of Complications, Morbidity, and/or Mortality  Presenting problems: moderate  Diagnostic procedures: low  Management options: low    Patient Progress  Patient progress: stable           Patient Care  Considerations:    SEPSIS IS NOT PRESENT IN THE EMERGENCY DEPARTMENT: Patient meets SIRS criteria in the emergency department however the patient does not have a known source of bacterial infection to confirm the diagnosis of sepsis.        Consultants/Shared Management Plan:    None    Social Determinants of Health:    Patient has presented with family members who are responsible, reliable and will ensure follow up care.      Disposition and Care Coordination:    Discharged: I considered escalation of care by admitting this patient to the hospital, however no signs of systemic sepsis    I have explained the patient´s condition, diagnoses and treatment plan based on the information available to me at this time. I have answered questions and addressed any concerns. The patient has a good  understanding of the patient´s diagnosis, condition, and treatment plan as can be expected at this point. The vital signs have been stable. The patient´s condition is stable and appropriate for discharge from the emergency department.      The patient will pursue further outpatient evaluation with the primary care physician or other designated or consulting physician as outlined in the discharge instructions. They are agreeable to this plan of care and follow-up instructions have been explained in detail. The patient has received these instructions in written format and has expressed an understanding of the discharge instructions. The patient is aware that any significant change in condition or worsening of symptoms should prompt an immediate return to this or the closest emergency department or call to 911.  I have explained discharge medications and the need for follow up with the patient/caretakers. This was also printed in the discharge instructions. Patient was discharged with the following medications and follow up:      Medication List        New Prescriptions      doxycycline 100 MG capsule  Commonly known as: MONODOX  Take  1 capsule by mouth 2 (Two) Times a Day for 14 days.     ketorolac 10 MG tablet  Commonly known as: TORADOL  Take 1 tablet by mouth Every 6 (Six) Hours As Needed for Moderate Pain, Severe Pain or Mild Pain.               Where to Get Your Medications        These medications were sent to [facility]/pharmacy #[id number] - [place], [place] - [address] - [phone]  - [phone]   [address] [place] [address]      Hours: 24-hours Phone: [phone]   doxycycline 100 MG capsule  ketorolac 10 MG tablet      Maite Montoya, DO  [address] [place] [address] [phone]    In 2 days  For lab results including tickborne illnesses and blood cultures       Final diagnoses:   Febrile illness   Upper respiratory tract infection, unspecified type   Tick bite, unspecified site, initial encounter        ED Disposition       ED Disposition   Discharge    Condition   Stable    Comment   --               This medical record created using voice recognition software.             Flor Rodriguez, DEMARCUS  06/15/24 2130

## 2024-06-16 NOTE — DISCHARGE INSTRUCTIONS
Your lab work did not show any apparent source of actual infection.  This may all just be viral in nature but due to your recent tick bite exposures and your symptoms we will prophylactically treat you while we are waiting on those results.  You will be notified if they are positive.  All results may be found at [facility].  Usually takes 48 to 72 hours for those to return.    Rest.  Drink plenty fluids.  Alternate Tylenol and NSAIDs for fever control.    Return for new or worsening symptoms

## 2024-06-18 LAB
B BURGDOR IGG+IGM SER QL IA: NEGATIVE
BACTERIA SPEC AEROBE CULT: NORMAL

## 2024-06-20 LAB
A PHAGOCYTOPH DNA BLD QL NAA+PROBE: NEGATIVE
BACTERIA SPEC AEROBE CULT: NORMAL
BACTERIA SPEC AEROBE CULT: NORMAL
E CHAFFEENSIS DNA BLD QL NAA+PROBE: NEGATIVE

## 2024-06-21 LAB — RICKETTSIA RICKETTSII DNA, RT: NOT DETECTED

## 2024-06-24 ENCOUNTER — LAB (OUTPATIENT)
Dept: LAB | Facility: HOSPITAL | Age: 59
End: 2024-06-24
Payer: OTHER GOVERNMENT

## 2024-06-24 ENCOUNTER — HOSPITAL ENCOUNTER (OUTPATIENT)
Dept: CT IMAGING | Facility: HOSPITAL | Age: 59
Discharge: HOME | End: 2024-06-24
Payer: OTHER GOVERNMENT

## 2024-06-24 ENCOUNTER — OFFICE VISIT (OUTPATIENT)
Dept: CARDIOLOGY | Facility: CLINIC | Age: 59
End: 2024-06-24
Payer: OTHER GOVERNMENT

## 2024-06-24 VITALS
BODY MASS INDEX: 29.78 KG/M2 | DIASTOLIC BLOOD PRESSURE: 82 MMHG | SYSTOLIC BLOOD PRESSURE: 140 MMHG | HEART RATE: 83 BPM | HEIGHT: 70 IN | WEIGHT: 208 LBS

## 2024-06-24 DIAGNOSIS — R53.83 FATIGUE, UNSPECIFIED TYPE: ICD-10-CM

## 2024-06-24 DIAGNOSIS — R50.9 FEBRILE ILLNESS: ICD-10-CM

## 2024-06-24 DIAGNOSIS — R05.9 COUGH, UNSPECIFIED TYPE: ICD-10-CM

## 2024-06-24 DIAGNOSIS — R07.9 CHEST PAIN, UNSPECIFIED TYPE: Primary | ICD-10-CM

## 2024-06-24 LAB
ALBUMIN SERPL-MCNC: 3.8 G/DL (ref 3.5–5.2)
ALBUMIN/GLOB SERPL: 1.7 G/DL
ALP SERPL-CCNC: 111 U/L (ref 39–117)
ALT SERPL W P-5'-P-CCNC: 92 U/L (ref 1–41)
ANION GAP SERPL CALCULATED.3IONS-SCNC: 9.4 MMOL/L (ref 5–15)
AST SERPL-CCNC: 63 U/L (ref 1–40)
BILIRUB SERPL-MCNC: 0.4 MG/DL (ref 0–1.2)
BUN SERPL-MCNC: 7 MG/DL (ref 6–20)
BUN/CREAT SERPL: 6.4 (ref 7–25)
CALCIUM SPEC-SCNC: 8.6 MG/DL (ref 8.6–10.5)
CHLORIDE SERPL-SCNC: 104 MMOL/L (ref 98–107)
CO2 SERPL-SCNC: 24.6 MMOL/L (ref 22–29)
CREAT SERPL-MCNC: 1.1 MG/DL (ref 0.76–1.27)
DEPRECATED RDW RBC AUTO: 40.7 FL (ref 37–54)
EGFRCR SERPLBLD CKD-EPI 2021: 77.3 ML/MIN/1.73
ERYTHROCYTE [DISTWIDTH] IN BLOOD BY AUTOMATED COUNT: 12.8 % (ref 12.3–15.4)
GLOBULIN UR ELPH-MCNC: 2.2 GM/DL
GLUCOSE SERPL-MCNC: 94 MG/DL (ref 65–99)
HCT VFR BLD AUTO: 40.6 % (ref 37.5–51)
HGB BLD-MCNC: 13.5 G/DL (ref 13–17.7)
MCH RBC QN AUTO: 29.2 PG (ref 26.6–33)
MCHC RBC AUTO-ENTMCNC: 33.3 G/DL (ref 31.5–35.7)
MCV RBC AUTO: 87.9 FL (ref 79–97)
PLATELET # BLD AUTO: 139 10*3/MM3 (ref 140–450)
PMV BLD AUTO: 9.1 FL (ref 6–12)
POTASSIUM SERPL-SCNC: 4.4 MMOL/L (ref 3.5–5.2)
PROT SERPL-MCNC: 6 G/DL (ref 6–8.5)
RBC # BLD AUTO: 4.62 10*6/MM3 (ref 4.14–5.8)
SODIUM SERPL-SCNC: 138 MMOL/L (ref 136–145)
T4 FREE SERPL-MCNC: 1.25 NG/DL (ref 0.92–1.68)
TSH SERPL DL<=0.05 MIU/L-ACNC: 1.7 UIU/ML (ref 0.27–4.2)
WBC NRBC COR # BLD AUTO: 2.95 10*3/MM3 (ref 3.4–10.8)

## 2024-06-24 PROCEDURE — 99214 OFFICE O/P EST MOD 30 MIN: CPT | Performed by: FAMILY MEDICINE

## 2024-06-24 PROCEDURE — 36415 COLL VENOUS BLD VENIPUNCTURE: CPT

## 2024-06-24 PROCEDURE — 80053 COMPREHEN METABOLIC PANEL: CPT

## 2024-06-24 PROCEDURE — 71250 CT THORAX DX C-: CPT

## 2024-06-24 PROCEDURE — 84443 ASSAY THYROID STIM HORMONE: CPT

## 2024-06-24 PROCEDURE — 84439 ASSAY OF FREE THYROXINE: CPT

## 2024-06-24 PROCEDURE — 85027 COMPLETE CBC AUTOMATED: CPT

## 2024-06-24 NOTE — PROGRESS NOTES
Chief Complaint  Follow-up and Pulmonary embolism, bilateral    Subjective        History of Present Illness  Pavan Perales presents to [facility] CARDIOLOGY   Mr. Perales is a 59-year-old male previously seen and evaluated by cardiology following bilateral pulmonary embolism following a shoulder surgery.  At that time he had echocardiogram which was unremarkable showing normal LVEF and no evidence of RV strain.  He has completed 6 months of anticoagulation since last seen.  He reports he has been  ill for approximately 6 week with symptoms of upper respiratory infection bronchitis.   He had an ED visit at the end of May due to symptoms of cough, shortness of breath with wheezing, was treated with azithromycin and steroids at that time.  He returned to urgent care a few days later, and was given Levaquin.  He reports he has been running fever intermittently, he had also noted to have bites, and was presumptively being treated for possible [place] spotted fever with doxycycline.  It appears his tickborne panels are negative.  He reports despite being on antibiotics he continues to run low-grade fevers, having chills and bodyaches.  He is also complaining of some intermittent episodes of sweating, generally worse at night.  Denies any foreign travel, or hemoptysis.  He does have some relief with ketorolac as prescribed recently.  Feels overwhelmingly tired.    States he decided to follow-up with cardiology he was concerned that after thinking about it he had symptoms of chest tightness and pressure even prior to his recent illness that have occurred on multiple occasions.  Currently feels mildly short of breath with activities while he has been ill but does not recall if he felt short of breath when he was having chest tightness and pressure previously.  Previous cardiac workup includes echocardiogram, no previous ischemic workup.  Most recent EKG shows normal sinus rhythm with early R wave  progression.      Past Medical History:   Diagnosis Date    Back pain     Cancer     Hyperlipidemia     Hypertension     Insomnia     PTSD (post-traumatic stress disorder)     Tinnitus        No Known Allergies     Past Surgical History:   Procedure Laterality Date    BACK SURGERY      x2    COLONOSCOPY N/A 01/03/2023    Procedure: COLONOSCOPY WITH POLYPECTOMY;  Surgeon: Jose Hopson MD;  Location: [facility] ENDOSCOPY;  Service: General;  Laterality: N/A;  COLON POLYP, SUBOPTIMAL PREP    ENDOSCOPY N/A 01/03/2023    Procedure: ESOPHAGOGASTRODUODENOSCOPY WITH BIOPSIES, POLYPECTOMIES;  Surgeon: Jose Hopson MD;  Location: [facility] ENDOSCOPY;  Service: General;  Laterality: N/A;  HIATAL HERNIA, GASTRIC POLYPS    KNEE SURGERY Right     OTHER SURGICAL HISTORY      CANCER REMOVAL FACE    SHOULDER ARTHROSCOPY Right     SHOULDER ARTHROSCOPY W/ ROTATOR CUFF REPAIR Right 8/17/2023    Procedure: SHOULDER ARTHROSCOPY WITH SUBCROMIAL DECOMPRESSION AND DISTAL CLAVICULECTOMY, OPEN BICEPS TENODESIS;  Surgeon: Marek Gaviria MD;  Location: [facility] OR [facility];  Service: Orthopedics;  Laterality: Right;        Social History  He  reports that he has never smoked. His smokeless tobacco use includes snuff. He reports current alcohol use. He reports that he does not currently use drugs.    Family History  His family history is not on file.       Current Outpatient Medications on File Prior to Visit   Medication Sig    albuterol sulfate  (90 Base) MCG/ACT inhaler Inhale 2 puffs Every 6 (Six) Hours As Needed for Shortness of Air or Wheezing.    atorvastatin (LIPITOR) 20 MG tablet Take 1 tablet by mouth Daily.    doxycycline (MONODOX) 100 MG capsule Take 1 capsule by mouth 2 (Two) Times a Day for 14 days.    ketorolac (TORADOL) 10 MG tablet Take 1 tablet by mouth Every 6 (Six) Hours As Needed for Moderate Pain, Severe Pain or Mild Pain.    lidocaine (Lidoderm) 5 % Place 1 patch on the skin as directed by provider Daily. Remove &  "Discard patch within 12 hours or as directed by MD    pantoprazole (PROTONIX) 40 MG EC tablet TAKE 1 TABLET BY MOUTH DAILY    traZODone (DESYREL) 100 MG tablet Take 3 tablets by mouth Every Night.    guaiFENesin-codeine (ROMILAR-AC) 100-10 MG/5ML solution/syrup TAKE 15 ML BY MOUTH EVERY 6 HOURS AS NEEDED FOR COUGH (Patient not taking: Reported on 6/24/2024)     No current facility-administered medications on file prior to visit.         Review of Systems   Constitutional:  Positive for chills, fatigue and fever.   Respiratory:  Positive for cough and shortness of breath. Negative for chest tightness.    Cardiovascular:  Positive for chest pain. Negative for palpitations and leg swelling.   Gastrointestinal:  Negative for nausea and vomiting.   Musculoskeletal:  Positive for arthralgias and myalgias.   Neurological:  Negative for dizziness and syncope.        Objective   Vitals:    06/24/24 0840   BP: 140/82   Pulse: 83   Weight: 94.3 kg (208 lb)   Height: 177.8 cm (70\")         Physical Exam  General : Alert, awake, no acute distress, mildly ill-appearing  Neck : Supple, no carotid bruit, no jugular venous distention  CVS : Regular rate and rhythm, no murmur, no rubs or gallops  Lungs: Clear, mildly decreased bases, without crackles, rhonchi or rales   Abdomen: Soft, nontender, bowel sounds active, no splenomegaly hepatomegaly  Extremities: Warm, well-perfused, no pedal edema      Result Review     The following data was reviewed by DEMARCUS Lu  proBNP   Date Value Ref Range Status   03/12/2024 <36.0 0.0 - 900.0 pg/mL Final     CMP          3/12/2024    10:58 6/15/2024    20:56 6/24/2024    09:40   CMP   Glucose 92  100  94    BUN 10  10  7    Creatinine 0.98  1.11  1.10    EGFR 88.8  76.5  77.3    Sodium 140  134  138    Potassium 4.1  4.6  4.4    Chloride 105  100  104    Calcium 8.7  8.2  8.6    Total Protein 6.1  5.9  6.0    Albumin 3.6  3.5  3.8    Globulin 2.5  2.4  2.2    Total Bilirubin 0.5  0.5  " "0.4    Alkaline Phosphatase 103  100  111    AST (SGOT) 18  43  63    ALT (SGPT) 18  47  92    Albumin/Globulin Ratio 1.4  1.5  1.7    BUN/Creatinine Ratio 10.2  9.0  6.4    Anion Gap 9.1  10.2  9.4      CBC w/diff          9/1/2023    04:34 3/12/2024    09:38 6/15/2024    20:56   CBC w/Diff   WBC 6.04  4.44  2.29    RBC 4.13  4.62  4.37    Hemoglobin 12.3  13.7  12.9    Hematocrit 36.0  40.2  38.2    MCV 87.2  87.0  87.4    MCH 29.8  29.7  29.5    MCHC 34.2  34.1  33.8    RDW 12.6  13.1  12.8    Platelets 239  178  104    Neutrophil Rel % 70.4  69.3  52.5    Immature Granulocyte Rel % 0.5  0.2  0.4    Lymphocyte Rel % 13.6  17.8  30.1    Monocyte Rel % 7.8  9.0  13.1    Eosinophil Rel % 7.0  3.2  2.6    Basophil Rel % 0.7  0.5  1.3       Lab Results   Component Value Date    TSH 1.700 06/24/2024      Lab Results   Component Value Date    FREET4 1.25 06/24/2024      No results found for: \"DDIMERQUANT\"  No results found for: \"MG\"   No results found for: \"DIGOXIN\"   Lab Results   Component Value Date    TROPONINT <6 03/12/2024               Results for orders placed during the hospital encounter of 08/29/23    Adult Transthoracic Echo Complete W/ Cont if Necessary Per Protocol    Interpretation Summary    Left ventricular ejection fraction appears to be 56 - 60%.    Left ventricular diastolic function was normal.    There were no apparent intracardiac masses, vegetations or thrombi.             Assessment and Plan   Diagnoses and all orders for this visit:    1. Febrile illness (Primary)  -     Comprehensive Metabolic Panel; Future  -     CBC (No Diff); Future  -     CT Chest Without Contrast Diagnostic; Future    2. Fatigue, unspecified type  -     TSH; Future  -     T4, Free; Future    3. Cough, unspecified type  -     CT Chest Without Contrast Diagnostic; Future      He describes some symptoms of chest pain and pressure recently, however the timing recurring frequently enough for him to seek evaluation.  In the " interim, he has been persistently ill.  Going to schedule him for a CT of the chest to evaluate for possibly an atypical pneumonia.  At this point he needs evaluation for his current illness, then we can revisit ischemic evaluation.  Did consider possible pericarditis, if CT is unremarkable we will get a repeat echo.          Follow Up   No follow-ups on file.    Patient was given instructions and counseling regarding his condition or for health maintenance advice. Please see specific information pulled into the AVS if appropriate.     Signed,  Tati Rojas, DEMARCUS  06/24/2024     Dictated Utilizing Dragon Dictation: Please note that portions of this note were completed with a voice recognition program.  Part of this note may be an electronic transcription/translation of spoken language to printed text using the Dragon Dictation System.

## 2024-06-25 DIAGNOSIS — R07.9 CHEST PAIN, UNSPECIFIED TYPE: Primary | ICD-10-CM

## 2024-06-25 DIAGNOSIS — R91.1 PULMONARY NODULE: Primary | ICD-10-CM

## 2024-06-25 DIAGNOSIS — R50.9 FEBRILE ILLNESS: ICD-10-CM

## 2024-06-25 NOTE — PROGRESS NOTES
Can we please call this patient and let him know that we have been informed of his abnormal CT scan.  Radiology has recommended considering a PET scan. If he is willing, I start the process of working out CT findings. We can also start that process of getting him out to a lung doctor. Let me know if any questions or concerns. Thank you!

## 2024-06-28 ENCOUNTER — OFFICE VISIT (OUTPATIENT)
Dept: PULMONOLOGY | Facility: CLINIC | Age: 59
End: 2024-06-28
Payer: OTHER GOVERNMENT

## 2024-06-28 VITALS
HEART RATE: 66 BPM | RESPIRATION RATE: 16 BRPM | DIASTOLIC BLOOD PRESSURE: 78 MMHG | WEIGHT: 206.2 LBS | OXYGEN SATURATION: 92 % | TEMPERATURE: 97.6 F | SYSTOLIC BLOOD PRESSURE: 125 MMHG | HEIGHT: 70 IN | BODY MASS INDEX: 29.52 KG/M2

## 2024-06-28 DIAGNOSIS — F17.211 NICOTINE DEPENDENCE, CIGARETTES, IN REMISSION: ICD-10-CM

## 2024-06-28 DIAGNOSIS — R91.8 ABNORMAL CT SCAN OF LUNG: Primary | ICD-10-CM

## 2024-06-28 DIAGNOSIS — R91.1 LUNG NODULE: ICD-10-CM

## 2024-06-28 NOTE — PROGRESS NOTES
Primary Care Provider  Maite Montoya DO     Referring Provider  No ref. provider found     Chief Complaint  Shortness of Breath and Establish Care (Pre ION )    Subjective          Pavan Perales presents to [facility] PULMONARY & CRITICAL CARE MEDICINE  History of Present Illness  Pavan Perales is a 59 y.o. male patient here to establish care as a new patient for an abnormal CT.    Patient recently had a chest CT on 6/24/2024.  This does show a 23 x 18 mm irregular subpleural nodule anteriorly in the right lung apex this is concerning for neoplastic nodule and is new since 8/29/2023.  There are several mildly prominent paratracheal lymph nodes measuring 12 mm.  Patient's primary care has ordered a PET scan.  His PET scan is scheduled for 7/8/2024.   Dr. Vogel is wanting to proceed with a robotic bronchoscopy with Ion.  Risks and benefits of procedure have been discussed with patient today in office.    Patient is a former smoker but is unsure of when he started or stopped.  He he states that he was not smoking very heavily or for a long period of time.  He is currently on doxycycline.  He denies any current fevers or chills.  He denies having a cough.  He denies any hemoptysis or unintentional weight loss.  Patient states that he was exposed to tuberculosis in [place] in 1994.  I explained patient that the biopsy would test for different bacteria, viruses, fungus, tuberculosis or MAC and cancer cells.  Patient and wife verbalized understanding.  He is still able to perform his ADLs without difficulty.  He denies any family history of lung conditions.  He is up-to-date with his COVID and flu vaccine but does decline pneumonia vaccine.  Patient not of age for RSV vaccine.     His history of smoking is   Tobacco Use: High Risk (6/28/2024)    Patient History     Smoking Tobacco Use: Former     Smokeless Tobacco Use: Current     Passive Exposure: Never   .    Review of Systems   Constitutional:   Negative for chills, fatigue, fever, unexpected weight gain and unexpected weight loss.   HENT:  Congestion: Nasal.    Respiratory:  Negative for apnea, cough, shortness of breath and wheezing.         Negative for Hemoptysis     Cardiovascular:  Negative for chest pain, palpitations and leg swelling.   Skin:         Negative for cyanosis      Sleep: Negative for Excessive daytime sleepiness  Negative for morning headaches  Negative for Snoring    Family History   Problem Relation Age of Onset    Malig Hyperthermia Neg Hx         Social History     Socioeconomic History    Marital status:    Tobacco Use    Smoking status: Former     Types: Cigarettes     Passive exposure: Never    Smokeless tobacco: Current     Types: Snuff    Tobacco comments:     Patient states he did smoke for a few years, unsure of time frame and PPD.    Vaping Use    Vaping status: Never Used   Substance and Sexual Activity    Alcohol use: Yes     Comment: RARELY    Drug use: Not Currently    Sexual activity: Defer        Past Medical History:   Diagnosis Date    Back pain     Cancer     skin    Hyperlipidemia     Hypertension     Insomnia     PTSD (post-traumatic stress disorder)     no isssues when waking from anesthesia    Tinnitus         Immunization History   Administered Date(s) Administered    COVID-19 (MODERNA) 1st,2nd,3rd Dose Monovalent 01/07/2021, 02/04/2021    Fluzone (or Fluarix & Flulaval for VFC) >6mos 10/23/2020, 12/20/2023    Influenza Injectable Mdck Pf Quad 11/30/2022    Shingrix 11/30/2022, 07/18/2023         No Known Allergies       Current Outpatient Medications:     albuterol sulfate  (90 Base) MCG/ACT inhaler, Inhale 2 puffs Every 6 (Six) Hours As Needed for Shortness of Air or Wheezing., Disp: 18 g, Rfl: 0    atorvastatin (LIPITOR) 20 MG tablet, Take 1 tablet by mouth Daily., Disp: , Rfl:     doxycycline (MONODOX) 100 MG capsule, Take 1 capsule by mouth 2 (Two) Times a Day for 14 days., Disp: 28 capsule,  Rfl: 0    ketorolac (TORADOL) 10 MG tablet, Take 1 tablet by mouth Every 6 (Six) Hours As Needed for Moderate Pain, Severe Pain or Mild Pain., Disp: 20 tablet, Rfl: 0    lidocaine (Lidoderm) 5 %, Place 1 patch on the skin as directed by provider Daily. Remove & Discard patch within 12 hours or as directed by MD, Disp: 15 each, Rfl: 1    pantoprazole (PROTONIX) 40 MG EC tablet, TAKE 1 TABLET BY MOUTH DAILY, Disp: 30 tablet, Rfl: 10    traZODone (DESYREL) 100 MG tablet, Take 3 tablets by mouth Every Night., Disp: , Rfl:     guaiFENesin-codeine (ROMILAR-AC) 100-10 MG/5ML solution/syrup, TAKE 15 ML BY MOUTH EVERY 6 HOURS AS NEEDED FOR COUGH (Patient not taking: Reported on 6/24/2024), Disp: , Rfl:      Objective   Physical Exam  Constitutional:       General: He is not in acute distress.     Appearance: Normal appearance. He is normal weight.   HENT:      Right Ear: Hearing normal.      Left Ear: Hearing normal.      Nose: No nasal tenderness or congestion.      Mouth/Throat:      Mouth: Mucous membranes are moist. No oral lesions.   Eyes:      Extraocular Movements: Extraocular movements intact.      Pupils: Pupils are equal, round, and reactive to light.   Cardiovascular:      Rate and Rhythm: Normal rate and regular rhythm.      Pulses: Normal pulses.      Heart sounds: Normal heart sounds. No murmur heard.  Pulmonary:      Effort: Pulmonary effort is normal.      Breath sounds: Normal breath sounds. No wheezing, rhonchi or rales.   Musculoskeletal:      Right lower leg: No edema.      Left lower leg: No edema.   Skin:     General: Skin is warm and dry.      Findings: No lesion or rash.   Neurological:      General: No focal deficit present.      Mental Status: He is alert and oriented to person, place, and time.   Psychiatric:         Mood and Affect: Affect normal. Mood is not anxious or depressed.         Vital Signs:   /78 (BP Location: Right arm, Patient Position: Sitting, Cuff Size: Large Adult)   Pulse  "66   Temp 97.6 °F (36.4 °C) (Oral)   Resp 16   Ht 177.8 cm (70\")   Wt 93.5 kg (206 lb 3.2 oz)   SpO2 92% Comment: RA  BMI 29.59 kg/m²        Result Review :   The following data was reviewed by: DEMARCUS Casey on 06/28/2024:  CMP          3/12/2024    10:58 6/15/2024    20:56 6/24/2024    09:40   CMP   Glucose 92  100  94    BUN 10  10  7    Creatinine 0.98  1.11  1.10    EGFR 88.8  76.5  77.3    Sodium 140  134  138    Potassium 4.1  4.6  4.4    Chloride 105  100  104    Calcium 8.7  8.2  8.6    Total Protein 6.1  5.9  6.0    Albumin 3.6  3.5  3.8    Globulin 2.5  2.4  2.2    Total Bilirubin 0.5  0.5  0.4    Alkaline Phosphatase 103  100  111    AST (SGOT) 18  43  63    ALT (SGPT) 18  47  92    Albumin/Globulin Ratio 1.4  1.5  1.7    BUN/Creatinine Ratio 10.2  9.0  6.4    Anion Gap 9.1  10.2  9.4      CBC w/diff          3/12/2024    09:38 6/15/2024    20:56 6/24/2024    09:40   CBC w/Diff   WBC 4.44  2.29  2.95    RBC 4.62  4.37  4.62    Hemoglobin 13.7  12.9  13.5    Hematocrit 40.2  38.2  40.6    MCV 87.0  87.4  87.9    MCH 29.7  29.5  29.2    MCHC 34.1  33.8  33.3    RDW 13.1  12.8  12.8    Platelets 178  104  139    Neutrophil Rel % 69.3  52.5     Immature Granulocyte Rel % 0.2  0.4     Lymphocyte Rel % 17.8  30.1     Monocyte Rel % 9.0  13.1     Eosinophil Rel % 3.2  2.6     Basophil Rel % 0.5  1.3       Data reviewed : Radiologic studies chest CT 8/29/2023, chest CT 6/24/2024 and Cardiology studies echocardiogram 8/31/2023    Procedures        Assessment and Plan    Diagnoses and all orders for this visit:    1. Abnormal CT scan of lung (Primary)  Comments:  Robotic bronchoscopy scheduled  Orders:  -     Complete PFT - Pre & Post Bronchodilator; Future    2. Lung nodule  Comments:  Robotic bronchoscopy scheduled  Orders:  -     Complete PFT - Pre & Post Bronchodilator; Future  -     Case Request; Standing  -     Follow Anesthesia Guidlines / Standing Orders; Standing  -     Obtain Informed " Consent; Standing  -     Case Request    3. Nicotine dependence, cigarettes, in remission      I have obtained consent for robotic bronchoscopy with brushings, biopsies, and bronchioloalveolar lavage.  Risks and benefits of bronchoscopy discussed with patient today.  Risks include pneumothorax, hemothorax, bleeding, hypoxia, required mechanical ventilation and death.  The patient recognizes these findings, acknowledges these findings and is agreeable to the procedure.  Dr. Vogel is agreeable to performing this procedure on 7/3/2024    I spent 67 minutes caring for Pavan on this date of service. This time includes time spent by me in the following activities:preparing for the visit, reviewing tests, obtaining and/or reviewing a separately obtained history, performing a medically appropriate examination and/or evaluation , counseling and educating the patient/family/caregiver, ordering medications, tests, or procedures, referring and communicating with other health care professionals , documenting information in the medical record, and care coordination    Follow Up   Return for bronch follow up 1 week after bronch with arpit.  Patient was given instructions and counseling regarding his condition or for health maintenance advice. Please see specific information pulled into the AVS if appropriate.

## 2024-06-28 NOTE — H&P (VIEW-ONLY)
Primary Care Provider  Maite Montoya DO     Referring Provider  No ref. provider found     Chief Complaint  Shortness of Breath and Establish Care (Pre ION )    Subjective          Pavan Perales presents to [facility]INE  History of Present Illness  Pavan Perales is a 59 y.o. male patient here to establish care as a new patient for an abnormal CT.    Patient recently had a chest CT on 6/24/2024.  This does show a 23 x 18 mm irregular subpleural nodule anteriorly in the right lung apex this is concerning for neoplastic nodule and is new since 8/29/2023.  There are several mildly prominent paratracheal lymph nodes measuring 12 mm.  Patient's primary care has ordered a PET scan.  His PET scan is scheduled for 7/8/2024.   Dr. Vogel is wanting to proceed with a robotic bronchoscopy with Ion.  Risks and benefits of procedure have been discussed with patient today in office.    Patient is a former smoker but is unsure of when he started or stopped.  He he states that he was not smoking very heavily or for a long period of time.  He is currently on doxycycline.  He denies any current fevers or chills.  He denies having a cough.  He denies any hemoptysis or unintentional weight loss.  Patient states that he was exposed to tuberculosis in [place] in 1994.  I explained patient that the biopsy would test for different bacteria, viruses, fungus, tuberculosis or MAC and cancer cells.  Patient and wife verbalized understanding.  He is still able to perform his ADLs without difficulty.  He denies any family history of lung conditions.  He is up-to-date with his COVID and flu vaccine but does decline pneumonia vaccine.  Patient not of age for RSV vaccine.     His history of smoking is   Tobacco Use: High Risk (6/28/2024)    Patient History     Smoking Tobacco Use: Former     Smokeless Tobacco Use: Current     Passive Exposure: Never   .    Review of Systems   Constitutional:   Negative for chills, fatigue, fever, unexpected weight gain and unexpected weight loss.   HENT:  Congestion: Nasal.    Respiratory:  Negative for apnea, cough, shortness of breath and wheezing.         Negative for Hemoptysis     Cardiovascular:  Negative for chest pain, palpitations and leg swelling.   Skin:         Negative for cyanosis      Sleep: Negative for Excessive daytime sleepiness  Negative for morning headaches  Negative for Snoring    Family History   Problem Relation Age of Onset    Malig Hyperthermia Neg Hx         Social History     Socioeconomic History    Marital status:    Tobacco Use    Smoking status: Former     Types: Cigarettes     Passive exposure: Never    Smokeless tobacco: Current     Types: Snuff    Tobacco comments:     Patient states he did smoke for a few years, unsure of time frame and PPD.    Vaping Use    Vaping status: Never Used   Substance and Sexual Activity    Alcohol use: Yes     Comment: RARELY    Drug use: Not Currently    Sexual activity: Defer        Past Medical History:   Diagnosis Date    Back pain     Cancer     skin    Hyperlipidemia     Hypertension     Insomnia     PTSD (post-traumatic stress disorder)     no isssues when waking from anesthesia    Tinnitus         Immunization History   Administered Date(s) Administered    COVID-19 (MODERNA) 1st,2nd,3rd Dose Monovalent 01/07/2021, 02/04/2021    Fluzone (or Fluarix & Flulaval for VFC) >6mos 10/23/2020, 12/20/2023    Influenza Injectable Mdck Pf Quad 11/30/2022    Shingrix 11/30/2022, 07/18/2023         No Known Allergies       Current Outpatient Medications:     albuterol sulfate  (90 Base) MCG/ACT inhaler, Inhale 2 puffs Every 6 (Six) Hours As Needed for Shortness of Air or Wheezing., Disp: 18 g, Rfl: 0    atorvastatin (LIPITOR) 20 MG tablet, Take 1 tablet by mouth Daily., Disp: , Rfl:     doxycycline (MONODOX) 100 MG capsule, Take 1 capsule by mouth 2 (Two) Times a Day for 14 days., Disp: 28 capsule,  Rfl: 0    ketorolac (TORADOL) 10 MG tablet, Take 1 tablet by mouth Every 6 (Six) Hours As Needed for Moderate Pain, Severe Pain or Mild Pain., Disp: 20 tablet, Rfl: 0    lidocaine (Lidoderm) 5 %, Place 1 patch on the skin as directed by provider Daily. Remove & Discard patch within 12 hours or as directed by MD, Disp: 15 each, Rfl: 1    pantoprazole (PROTONIX) 40 MG EC tablet, TAKE 1 TABLET BY MOUTH DAILY, Disp: 30 tablet, Rfl: 10    traZODone (DESYREL) 100 MG tablet, Take 3 tablets by mouth Every Night., Disp: , Rfl:     guaiFENesin-codeine (ROMILAR-AC) 100-10 MG/5ML solution/syrup, TAKE 15 ML BY MOUTH EVERY 6 HOURS AS NEEDED FOR COUGH (Patient not taking: Reported on 6/24/2024), Disp: , Rfl:      Objective   Physical Exam  Constitutional:       General: He is not in acute distress.     Appearance: Normal appearance. He is normal weight.   HENT:      Right Ear: Hearing normal.      Left Ear: Hearing normal.      Nose: No nasal tenderness or congestion.      Mouth/Throat:      Mouth: Mucous membranes are moist. No oral lesions.   Eyes:      Extraocular Movements: Extraocular movements intact.      Pupils: Pupils are equal, round, and reactive to light.   Cardiovascular:      Rate and Rhythm: Normal rate and regular rhythm.      Pulses: Normal pulses.      Heart sounds: Normal heart sounds. No murmur heard.  Pulmonary:      Effort: Pulmonary effort is normal.      Breath sounds: Normal breath sounds. No wheezing, rhonchi or rales.   Musculoskeletal:      Right lower leg: No edema.      Left lower leg: No edema.   Skin:     General: Skin is warm and dry.      Findings: No lesion or rash.   Neurological:      General: No focal deficit present.      Mental Status: He is alert and oriented to person, place, and time.   Psychiatric:         Mood and Affect: Affect normal. Mood is not anxious or depressed.         Vital Signs:   /78 (BP Location: Right arm, Patient Position: Sitting, Cuff Size: Large Adult)   Pulse  "66   Temp 97.6 °F (36.4 °C) (Oral)   Resp 16   Ht 177.8 cm (70\")   Wt 93.5 kg (206 lb 3.2 oz)   SpO2 92% Comment: RA  BMI 29.59 kg/m²        Result Review :   The following data was reviewed by: DEMARCUS Casey on 06/28/2024:  CMP          3/12/2024    10:58 6/15/2024    20:56 6/24/2024    09:40   CMP   Glucose 92  100  94    BUN 10  10  7    Creatinine 0.98  1.11  1.10    EGFR 88.8  76.5  77.3    Sodium 140  134  138    Potassium 4.1  4.6  4.4    Chloride 105  100  104    Calcium 8.7  8.2  8.6    Total Protein 6.1  5.9  6.0    Albumin 3.6  3.5  3.8    Globulin 2.5  2.4  2.2    Total Bilirubin 0.5  0.5  0.4    Alkaline Phosphatase 103  100  111    AST (SGOT) 18  43  63    ALT (SGPT) 18  47  92    Albumin/Globulin Ratio 1.4  1.5  1.7    BUN/Creatinine Ratio 10.2  9.0  6.4    Anion Gap 9.1  10.2  9.4      CBC w/diff          3/12/2024    09:38 6/15/2024    20:56 6/24/2024    09:40   CBC w/Diff   WBC 4.44  2.29  2.95    RBC 4.62  4.37  4.62    Hemoglobin 13.7  12.9  13.5    Hematocrit 40.2  38.2  40.6    MCV 87.0  87.4  87.9    MCH 29.7  29.5  29.2    MCHC 34.1  33.8  33.3    RDW 13.1  12.8  12.8    Platelets 178  104  139    Neutrophil Rel % 69.3  52.5     Immature Granulocyte Rel % 0.2  0.4     Lymphocyte Rel % 17.8  30.1     Monocyte Rel % 9.0  13.1     Eosinophil Rel % 3.2  2.6     Basophil Rel % 0.5  1.3       Data reviewed : Radiologic studies chest CT 8/29/2023, chest CT 6/24/2024 and Cardiology studies echocardiogram 8/31/2023    Procedures        Assessment and Plan    Diagnoses and all orders for this visit:    1. Abnormal CT scan of lung (Primary)  Comments:  Robotic bronchoscopy scheduled  Orders:  -     Complete PFT - Pre & Post Bronchodilator; Future    2. Lung nodule  Comments:  Robotic bronchoscopy scheduled  Orders:  -     Complete PFT - Pre & Post Bronchodilator; Future  -     Case Request; Standing  -     Follow Anesthesia Guidlines / Standing Orders; Standing  -     Obtain Informed " Consent; Standing  -     Case Request    3. Nicotine dependence, cigarettes, in remission      I have obtained consent for robotic bronchoscopy with brushings, biopsies, and bronchioloalveolar lavage.  Risks and benefits of bronchoscopy discussed with patient today.  Risks include pneumothorax, hemothorax, bleeding, hypoxia, required mechanical ventilation and death.  The patient recognizes these findings, acknowledges these findings and is agreeable to the procedure.  Dr. Vogel is agreeable to performing this procedure on 7/3/2024    I spent 67 minutes caring for Pavan on this date of service. This time includes time spent by me in the following activities:preparing for the visit, reviewing tests, obtaining and/or reviewing a separately obtained history, performing a medically appropriate examination and/or evaluation , counseling and educating the patient/family/caregiver, ordering medications, tests, or procedures, referring and communicating with other health care professionals , documenting information in the medical record, and care coordination    Follow Up   Return for bronch follow up 1 week after bronch with arpit.  Patient was given instructions and counseling regarding his condition or for health maintenance advice. Please see specific information pulled into the AVS if appropriate.

## 2024-07-01 NOTE — PRE-PROCEDURE INSTRUCTIONS
ATIENT INSTRUCTED TO BE:    - NOTHING TO EAT AFTER MIDNIGHT OR CHEW, EXCEPT CAN HAVE CLEAR LIQUIDS 2 HOURS PRIOR TO SURGERY ARRIVAL TIME , NO MORE THAN 8 OZ. (NOTHING RED)     - TO HOLD ALL VITAMINS, SUPPLEMENTS, NSAIDS FOR ONE WEEK PRIOR TO THEIR SURGICAL PROCEDURE    - DO NOT TAKE _____________NA_________ 7 DAYS PRIOR TO PROCEDURE PER ANESTHESIA RECOMMENDATIONS/INSTRUCTIONS     - INSTRUCTED PT TO USE SURGICAL SOAP 1 TIME THE NIGHT PRIOR TO SURGERY ___________ OR THE AM OF SURGERY _____________   USE THE SOAP FROM NECK TO TOES, AVOID THEIR FACE, HAIR, AND PRIVATE PARTS. IF USE THE SOAP THE NIGHT PRIOR TO SURGERY, CHANGE BED LINENS AND NO PETS IN THE BED.     INSTRUCTED NO LOTIONS, JEWELRY, PIERCINGS,  NAIL POLISH, OR DEODORANT DAY OF SURGERY    - IF DIABETIC, CHECK BLOOD GLUCOSE IF LESS THAN 70 OR HAVING SYMPTOMS CALL THE PREOP AREA FOR INSTRUCTIONS ON AM OF SURGERY ([phone])    -INSTRUCTED TO TAKE THE FOLLOWING MEDICATIONS THE DAY OF SURGERY WITH SIPS OF WATER:   ATORVASTATIN, PANTOPRAZOLE, ALBUTEROL INHALER AS NEEDED        - DO NOT BRING ANY MEDICATIONS WITH YOU TO THE HOSPITAL THE DAY OF SURGERY, EXCEPT IF USE INHALERS. BRING INHALERS DAY OF SURGERY       - BRING CPAP OR BIPAP TO THE HOSPITAL ONLY IF YOU ARE SPENDING THE NIGHT    - DO NOT SMOKE OR VAPE 24 HOURS PRIOR TO PROCEDURE PER ANESTHESIA REQUEST     -MAKE SURE YOU HAVE A RIDE HOME OR SOMEONE TO STAY WITH YOU THE DAY OF THE PROCEDURE AFTER YOU GO HOME     - FOLLOW ANY OTHER INSTRUCTIONS GIVEN TO YOU BY YOUR SURGEON'S OFFICE.     - DAY OF SURGERY ____________, COME TO ELEVATOR A, THIRD FLOOR, CHECK IN AT THE DESK FOR REGISTRATION/SURGERY     - YOU WILL RECEIVE A PHONE CALL THE DAY PRIOR TO SURGERY BETWEEN 1PM AND 4 PM WITH ARRIVAL TIME, IF YOUR SURGERY IS ON A MONDAY YOU WILL RECEIVE A CALL THE FRIDAY PRIOR TO SURGERY DATE    - BRING CASH OR CREDIT CARD FOR COPAYMENT OF MEDICATIONS AFTER SURGERY IF YOU USE THE HOSPITAL PHARMACY (MEDS TO BED)    -  PREADMISSION TESTING NURSE AMY TANG [phone] IF HAVE ANY QUESTIONS     -PATIENT PROVIDED THE NUMBER FOR PREOP SURGICAL DEPT IF HAD QUESTIONS AFTER HOURS PRIOR TO SURGERY ([phone] ).  INFORMED PT IF NO ANSWER, LEAVE A MESSAGE AND SOMEONE WILL RETURN THEIR CALL       PATIENT VERBALIZED UNDERSTANDING

## 2024-07-02 ENCOUNTER — HOSPITAL ENCOUNTER (OUTPATIENT)
Dept: RESPIRATORY THERAPY | Facility: HOSPITAL | Age: 59
Discharge: HOME | End: 2024-07-02
Admitting: NURSE PRACTITIONER
Payer: OTHER GOVERNMENT

## 2024-07-02 DIAGNOSIS — R91.1 LUNG NODULE: ICD-10-CM

## 2024-07-02 DIAGNOSIS — R91.8 ABNORMAL CT SCAN OF LUNG: ICD-10-CM

## 2024-07-02 PROCEDURE — 94726 PLETHYSMOGRAPHY LUNG VOLUMES: CPT | Performed by: INTERNAL MEDICINE

## 2024-07-02 PROCEDURE — 94060 EVALUATION OF WHEEZING: CPT

## 2024-07-02 PROCEDURE — 94060 EVALUATION OF WHEEZING: CPT | Performed by: INTERNAL MEDICINE

## 2024-07-02 PROCEDURE — 94729 DIFFUSING CAPACITY: CPT

## 2024-07-02 PROCEDURE — 94726 PLETHYSMOGRAPHY LUNG VOLUMES: CPT

## 2024-07-02 PROCEDURE — 94729 DIFFUSING CAPACITY: CPT | Performed by: INTERNAL MEDICINE

## 2024-07-02 RX ORDER — ALBUTEROL SULFATE 2.5 MG/3ML
2.5 SOLUTION RESPIRATORY (INHALATION) ONCE
Status: COMPLETED | OUTPATIENT
Start: 2024-07-02 | End: 2024-07-02

## 2024-07-02 RX ADMIN — ALBUTEROL SULFATE 2.5 MG: 2.5 SOLUTION RESPIRATORY (INHALATION) at 11:40

## 2024-07-03 ENCOUNTER — ANESTHESIA (OUTPATIENT)
Dept: PERIOP | Facility: HOSPITAL | Age: 59
End: 2024-07-03
Payer: OTHER GOVERNMENT

## 2024-07-03 ENCOUNTER — HOSPITAL ENCOUNTER (INPATIENT)
Facility: HOSPITAL | Age: 59
LOS: 1 days | Discharge: HOME | DRG: 166 | End: 2024-07-04
Attending: INTERNAL MEDICINE | Admitting: INTERNAL MEDICINE
Payer: OTHER GOVERNMENT

## 2024-07-03 ENCOUNTER — APPOINTMENT (OUTPATIENT)
Dept: GENERAL RADIOLOGY | Facility: HOSPITAL | Age: 59
DRG: 166 | End: 2024-07-03
Payer: OTHER GOVERNMENT

## 2024-07-03 ENCOUNTER — ANESTHESIA EVENT (OUTPATIENT)
Dept: PERIOP | Facility: HOSPITAL | Age: 59
End: 2024-07-03
Payer: OTHER GOVERNMENT

## 2024-07-03 DIAGNOSIS — R91.1 LUNG NODULE: ICD-10-CM

## 2024-07-03 PROBLEM — J93.9 PNEUMOTHORAX: Status: ACTIVE | Noted: 2024-07-03

## 2024-07-03 LAB
ACB CMPLX DNA BAL NAA+NON-PRB-NCNCRNG: NOT DETECTED
BLACTX-M ISLT/SPM QL: NORMAL
BLAIMP ISLT/SPM QL: NORMAL
BLAKPC ISLT/SPM QL: NORMAL
BLAOXA-48-LIKE ISLT/SPM QL: NORMAL
BLAVIM ISLT/SPM QL: NORMAL
C PNEUM DNA NPH QL NAA+NON-PROBE: NOT DETECTED
CILIATED BAL QL: 14 %
E CLOAC COMP DNA BAL NAA+NON-PRB-NCNCRNG: NOT DETECTED
E COLI DNA BAL NAA+NON-PRB-NCNCRNG: NOT DETECTED
EOSINOPHIL NFR FLD MANUAL: 1 %
FLUAV SUBTYP SPEC NAA+PROBE: NOT DETECTED
FLUBV RNA ISLT QL NAA+PROBE: NOT DETECTED
GP B STREP DNA BAL NAA+NON-PRB-NCNCRNG: NOT DETECTED
HADV DNA SPEC NAA+PROBE: NOT DETECTED
HAEM INFLU DNA BAL NAA+NON-PRB-NCNCRNG: NOT DETECTED
HCOV RNA LOWER RESP QL NAA+NON-PROBE: NOT DETECTED
HMPV RNA NPH QL NAA+NON-PROBE: NOT DETECTED
HPIV RNA LOWER RESP QL NAA+NON-PROBE: NOT DETECTED
K AEROGENES DNA BAL NAA+NON-PRB-NCNCRNG: NOT DETECTED
K OXYTOCA DNA BAL NAA+NON-PRB-NCNCRNG: NOT DETECTED
K PNEU GRP DNA BAL NAA+NON-PRB-NCNCRNG: NOT DETECTED
L PNEUMO DNA LOWER RESP QL NAA+NON-PROBE: NOT DETECTED
LYMPHOCYTES NFR FLD MANUAL: 46 %
M CATARRHALIS DNA BAL NAA+NON-PRB-NCNCRNG: NOT DETECTED
M PNEUMO IGG SER IA-ACNC: NOT DETECTED
MACROPHAGE FLUID: 17 %
MECA+MECC ISLT/SPM QL: NORMAL
NDM GENE: NORMAL
NEUTROPHILS NFR FLD MANUAL: 22 %
P AERUGINOSA DNA BAL NAA+NON-PRB-NCNCRNG: NOT DETECTED
PROTEUS SP DNA BAL NAA+NON-PRB-NCNCRNG: NOT DETECTED
RHINOVIRUS RNA SPEC NAA+PROBE: NOT DETECTED
RSV RNA NPH QL NAA+NON-PROBE: NOT DETECTED
S AUREUS DNA BAL NAA+NON-PRB-NCNCRNG: NOT DETECTED
S MARCESCENS DNA BAL NAA+NON-PRB-NCNCRNG: NOT DETECTED
S PNEUM DNA BAL NAA+NON-PRB-NCNCRNG: NOT DETECTED
S PYO DNA BAL NAA+NON-PRB-NCNCRNG: NOT DETECTED
VISUAL PRESENCE OF BLOOD: NORMAL

## 2024-07-03 PROCEDURE — 94761 N-INVAS EAR/PLS OXIMETRY MLT: CPT

## 2024-07-03 PROCEDURE — 87205 SMEAR GRAM STAIN: CPT | Performed by: INTERNAL MEDICINE

## 2024-07-03 PROCEDURE — 25810000003 LACTATED RINGERS PER 1000 ML: Performed by: ANESTHESIOLOGY

## 2024-07-03 PROCEDURE — 31628 BRONCHOSCOPY/LUNG BX EACH: CPT | Performed by: INTERNAL MEDICINE

## 2024-07-03 PROCEDURE — 31627 NAVIGATIONAL BRONCHOSCOPY: CPT | Performed by: INTERNAL MEDICINE

## 2024-07-03 PROCEDURE — 88104 CYTOPATH FL NONGYN SMEARS: CPT | Performed by: INTERNAL MEDICINE

## 2024-07-03 PROCEDURE — 87070 CULTURE OTHR SPECIMN AEROBIC: CPT | Performed by: INTERNAL MEDICINE

## 2024-07-03 PROCEDURE — 25010000002 SUGAMMADEX 200 MG/2ML SOLUTION

## 2024-07-03 PROCEDURE — 07D78ZX EXTRACTION OF THORAX LYMPHATIC, VIA NATURAL OR ARTIFICIAL OPENING ENDOSCOPIC, DIAGNOSTIC: ICD-10-PCS | Performed by: INTERNAL MEDICINE

## 2024-07-03 PROCEDURE — 31624 DX BRONCHOSCOPE/LAVAGE: CPT | Performed by: INTERNAL MEDICINE

## 2024-07-03 PROCEDURE — 8E0W8CZ ROBOTIC ASSISTED PROCEDURE OF TRUNK REGION, VIA NATURAL OR ARTIFICIAL OPENING ENDOSCOPIC: ICD-10-PCS | Performed by: INTERNAL MEDICINE

## 2024-07-03 PROCEDURE — 0B9C8ZX DRAINAGE OF RIGHT UPPER LUNG LOBE, VIA NATURAL OR ARTIFICIAL OPENING ENDOSCOPIC, DIAGNOSTIC: ICD-10-PCS | Performed by: INTERNAL MEDICINE

## 2024-07-03 PROCEDURE — 94640 AIRWAY INHALATION TREATMENT: CPT

## 2024-07-03 PROCEDURE — 25010000002 DEXAMETHASONE PER 1 MG

## 2024-07-03 PROCEDURE — 88108 CYTOPATH CONCENTRATE TECH: CPT | Performed by: INTERNAL MEDICINE

## 2024-07-03 PROCEDURE — 87206 SMEAR FLUORESCENT/ACID STAI: CPT | Performed by: INTERNAL MEDICINE

## 2024-07-03 PROCEDURE — 25010000002 HEPARIN (PORCINE) PER 1000 UNITS: Performed by: INTERNAL MEDICINE

## 2024-07-03 PROCEDURE — 25010000002 PROPOFOL 10 MG/ML EMULSION

## 2024-07-03 PROCEDURE — 87116 MYCOBACTERIA CULTURE: CPT | Performed by: INTERNAL MEDICINE

## 2024-07-03 PROCEDURE — 25010000002 ONDANSETRON PER 1 MG

## 2024-07-03 PROCEDURE — 71045 X-RAY EXAM CHEST 1 VIEW: CPT

## 2024-07-03 PROCEDURE — 76000 FLUOROSCOPY <1 HR PHYS/QHP: CPT

## 2024-07-03 PROCEDURE — 25010000002 MIDAZOLAM PER 1MG: Performed by: ANESTHESIOLOGY

## 2024-07-03 PROCEDURE — 31629 BRONCHOSCOPY/NEEDLE BX EACH: CPT | Performed by: INTERNAL MEDICINE

## 2024-07-03 PROCEDURE — 99223 1ST HOSP IP/OBS HIGH 75: CPT | Performed by: INTERNAL MEDICINE

## 2024-07-03 PROCEDURE — 31652 BRONCH EBUS SAMPLNG 1/2 NODE: CPT | Performed by: INTERNAL MEDICINE

## 2024-07-03 PROCEDURE — 87102 FUNGUS ISOLATION CULTURE: CPT | Performed by: INTERNAL MEDICINE

## 2024-07-03 PROCEDURE — 87071 CULTURE AEROBIC QUANT OTHER: CPT | Performed by: INTERNAL MEDICINE

## 2024-07-03 PROCEDURE — 88312 SPECIAL STAINS GROUP 1: CPT | Performed by: INTERNAL MEDICINE

## 2024-07-03 PROCEDURE — 31654 BRONCH EBUS IVNTJ PERPH LES: CPT | Performed by: INTERNAL MEDICINE

## 2024-07-03 PROCEDURE — 0BBC8ZX EXCISION OF RIGHT UPPER LUNG LOBE, VIA NATURAL OR ARTIFICIAL OPENING ENDOSCOPIC, DIAGNOSTIC: ICD-10-PCS | Performed by: INTERNAL MEDICINE

## 2024-07-03 PROCEDURE — 0BDC8ZX EXTRACTION OF RIGHT UPPER LUNG LOBE, VIA NATURAL OR ARTIFICIAL OPENING ENDOSCOPIC, DIAGNOSTIC: ICD-10-PCS | Performed by: INTERNAL MEDICINE

## 2024-07-03 PROCEDURE — 0B918ZZ DRAINAGE OF TRACHEA, VIA NATURAL OR ARTIFICIAL OPENING ENDOSCOPIC: ICD-10-PCS | Performed by: INTERNAL MEDICINE

## 2024-07-03 PROCEDURE — 88305 TISSUE EXAM BY PATHOLOGIST: CPT | Performed by: INTERNAL MEDICINE

## 2024-07-03 PROCEDURE — C1726 CATH, BAL DIL, NON-VASCULAR: HCPCS | Performed by: INTERNAL MEDICINE

## 2024-07-03 PROCEDURE — 3E1F88Z IRRIGATION OF RESPIRATORY TRACT USING IRRIGATING SUBSTANCE, VIA NATURAL OR ARTIFICIAL OPENING ENDOSCOPIC: ICD-10-PCS | Performed by: INTERNAL MEDICINE

## 2024-07-03 PROCEDURE — 89051 BODY FLUID CELL COUNT: CPT | Performed by: INTERNAL MEDICINE

## 2024-07-03 PROCEDURE — 87633 RESP VIRUS 12-25 TARGETS: CPT | Performed by: INTERNAL MEDICINE

## 2024-07-03 PROCEDURE — 94799 UNLISTED PULMONARY SVC/PX: CPT

## 2024-07-03 PROCEDURE — 25010000002 FENTANYL CITRATE (PF) 50 MCG/ML SOLUTION

## 2024-07-03 PROCEDURE — 88173 CYTOPATH EVAL FNA REPORT: CPT | Performed by: INTERNAL MEDICINE

## 2024-07-03 RX ORDER — PROMETHAZINE HYDROCHLORIDE 12.5 MG/1
25 TABLET ORAL ONCE AS NEEDED
Status: DISCONTINUED | OUTPATIENT
Start: 2024-07-03 | End: 2024-07-03 | Stop reason: HOSPADM

## 2024-07-03 RX ORDER — HEPARIN SODIUM 5000 [USP'U]/ML
5000 INJECTION, SOLUTION INTRAVENOUS; SUBCUTANEOUS EVERY 12 HOURS SCHEDULED
Status: DISCONTINUED | OUTPATIENT
Start: 2024-07-03 | End: 2024-07-04 | Stop reason: HOSPADM

## 2024-07-03 RX ORDER — HYDROCODONE BITARTRATE AND ACETAMINOPHEN 5; 325 MG/1; MG/1
1 TABLET ORAL EVERY 6 HOURS PRN
Status: DISCONTINUED | OUTPATIENT
Start: 2024-07-03 | End: 2024-07-04 | Stop reason: HOSPADM

## 2024-07-03 RX ORDER — ROCURONIUM BROMIDE 10 MG/ML
INJECTION, SOLUTION INTRAVENOUS AS NEEDED
Status: DISCONTINUED | OUTPATIENT
Start: 2024-07-03 | End: 2024-07-03 | Stop reason: SURG

## 2024-07-03 RX ORDER — SODIUM CHLORIDE 0.9 % (FLUSH) 0.9 %
10 SYRINGE (ML) INJECTION EVERY 12 HOURS SCHEDULED
Status: DISCONTINUED | OUTPATIENT
Start: 2024-07-03 | End: 2024-07-04 | Stop reason: HOSPADM

## 2024-07-03 RX ORDER — OXYCODONE HYDROCHLORIDE 5 MG/1
5 TABLET ORAL
Status: DISCONTINUED | OUTPATIENT
Start: 2024-07-03 | End: 2024-07-03 | Stop reason: HOSPADM

## 2024-07-03 RX ORDER — SODIUM CHLORIDE 9 MG/ML
40 INJECTION, SOLUTION INTRAVENOUS AS NEEDED
Status: DISCONTINUED | OUTPATIENT
Start: 2024-07-03 | End: 2024-07-04 | Stop reason: HOSPADM

## 2024-07-03 RX ORDER — MIDAZOLAM HYDROCHLORIDE 2 MG/2ML
2 INJECTION, SOLUTION INTRAMUSCULAR; INTRAVENOUS ONCE
Status: COMPLETED | OUTPATIENT
Start: 2024-07-03 | End: 2024-07-03

## 2024-07-03 RX ORDER — IPRATROPIUM BROMIDE AND ALBUTEROL SULFATE 2.5; .5 MG/3ML; MG/3ML
3 SOLUTION RESPIRATORY (INHALATION)
Status: DISCONTINUED | OUTPATIENT
Start: 2024-07-03 | End: 2024-07-04 | Stop reason: HOSPADM

## 2024-07-03 RX ORDER — TRAZODONE HYDROCHLORIDE 100 MG/1
300 TABLET ORAL NIGHTLY
Status: DISCONTINUED | OUTPATIENT
Start: 2024-07-03 | End: 2024-07-04 | Stop reason: HOSPADM

## 2024-07-03 RX ORDER — LIDOCAINE HYDROCHLORIDE 20 MG/ML
INJECTION, SOLUTION EPIDURAL; INFILTRATION; INTRACAUDAL; PERINEURAL AS NEEDED
Status: DISCONTINUED | OUTPATIENT
Start: 2024-07-03 | End: 2024-07-03 | Stop reason: SURG

## 2024-07-03 RX ORDER — DEXAMETHASONE SODIUM PHOSPHATE 4 MG/ML
INJECTION, SOLUTION INTRA-ARTICULAR; INTRALESIONAL; INTRAMUSCULAR; INTRAVENOUS; SOFT TISSUE AS NEEDED
Status: DISCONTINUED | OUTPATIENT
Start: 2024-07-03 | End: 2024-07-03 | Stop reason: SURG

## 2024-07-03 RX ORDER — SODIUM CHLORIDE, SODIUM LACTATE, POTASSIUM CHLORIDE, CALCIUM CHLORIDE 600; 310; 30; 20 MG/100ML; MG/100ML; MG/100ML; MG/100ML
9 INJECTION, SOLUTION INTRAVENOUS CONTINUOUS PRN
Status: DISCONTINUED | OUTPATIENT
Start: 2024-07-03 | End: 2024-07-03 | Stop reason: HOSPADM

## 2024-07-03 RX ORDER — FENTANYL CITRATE 50 UG/ML
INJECTION, SOLUTION INTRAMUSCULAR; INTRAVENOUS AS NEEDED
Status: DISCONTINUED | OUTPATIENT
Start: 2024-07-03 | End: 2024-07-03 | Stop reason: SURG

## 2024-07-03 RX ORDER — PROMETHAZINE HYDROCHLORIDE 25 MG/1
25 SUPPOSITORY RECTAL ONCE AS NEEDED
Status: DISCONTINUED | OUTPATIENT
Start: 2024-07-03 | End: 2024-07-03 | Stop reason: HOSPADM

## 2024-07-03 RX ORDER — PROPOFOL 10 MG/ML
VIAL (ML) INTRAVENOUS AS NEEDED
Status: DISCONTINUED | OUTPATIENT
Start: 2024-07-03 | End: 2024-07-03 | Stop reason: SURG

## 2024-07-03 RX ORDER — SODIUM CHLORIDE 0.9 % (FLUSH) 0.9 %
10 SYRINGE (ML) INJECTION AS NEEDED
Status: DISCONTINUED | OUTPATIENT
Start: 2024-07-03 | End: 2024-07-04 | Stop reason: HOSPADM

## 2024-07-03 RX ORDER — PANTOPRAZOLE SODIUM 40 MG/1
40 TABLET, DELAYED RELEASE ORAL
Status: DISCONTINUED | OUTPATIENT
Start: 2024-07-04 | End: 2024-07-04 | Stop reason: HOSPADM

## 2024-07-03 RX ORDER — MEPERIDINE HYDROCHLORIDE 25 MG/ML
12.5 INJECTION INTRAMUSCULAR; INTRAVENOUS; SUBCUTANEOUS
Status: DISCONTINUED | OUTPATIENT
Start: 2024-07-03 | End: 2024-07-03 | Stop reason: HOSPADM

## 2024-07-03 RX ORDER — ONDANSETRON 2 MG/ML
INJECTION INTRAMUSCULAR; INTRAVENOUS AS NEEDED
Status: DISCONTINUED | OUTPATIENT
Start: 2024-07-03 | End: 2024-07-03 | Stop reason: SURG

## 2024-07-03 RX ORDER — ATORVASTATIN CALCIUM 20 MG/1
20 TABLET, FILM COATED ORAL NIGHTLY
Status: DISCONTINUED | OUTPATIENT
Start: 2024-07-03 | End: 2024-07-04 | Stop reason: HOSPADM

## 2024-07-03 RX ORDER — ACETAMINOPHEN 500 MG
1000 TABLET ORAL ONCE
Status: COMPLETED | OUTPATIENT
Start: 2024-07-03 | End: 2024-07-03

## 2024-07-03 RX ORDER — ONDANSETRON 2 MG/ML
4 INJECTION INTRAMUSCULAR; INTRAVENOUS ONCE AS NEEDED
Status: DISCONTINUED | OUTPATIENT
Start: 2024-07-03 | End: 2024-07-03 | Stop reason: HOSPADM

## 2024-07-03 RX ORDER — PHENYLEPHRINE HCL IN 0.9% NACL 1 MG/10 ML
SYRINGE (ML) INTRAVENOUS AS NEEDED
Status: DISCONTINUED | OUTPATIENT
Start: 2024-07-03 | End: 2024-07-03 | Stop reason: SURG

## 2024-07-03 RX ORDER — DEXMEDETOMIDINE HYDROCHLORIDE 100 UG/ML
INJECTION, SOLUTION INTRAVENOUS AS NEEDED
Status: DISCONTINUED | OUTPATIENT
Start: 2024-07-03 | End: 2024-07-03 | Stop reason: SURG

## 2024-07-03 RX ADMIN — ACETAMINOPHEN 1000 MG: 500 TABLET ORAL at 11:48

## 2024-07-03 RX ADMIN — MIDAZOLAM HYDROCHLORIDE 2 MG: 1 INJECTION, SOLUTION INTRAMUSCULAR; INTRAVENOUS at 14:26

## 2024-07-03 RX ADMIN — OXYCODONE 5 MG: 5 TABLET ORAL at 17:18

## 2024-07-03 RX ADMIN — ROCURONIUM BROMIDE 50 MG: 10 INJECTION, SOLUTION INTRAVENOUS at 15:08

## 2024-07-03 RX ADMIN — HEPARIN SODIUM 5000 UNITS: 5000 INJECTION INTRAVENOUS; SUBCUTANEOUS at 20:56

## 2024-07-03 RX ADMIN — Medication 100 MCG: at 15:14

## 2024-07-03 RX ADMIN — SODIUM CHLORIDE, POTASSIUM CHLORIDE, SODIUM LACTATE AND CALCIUM CHLORIDE: 600; 310; 30; 20 INJECTION, SOLUTION INTRAVENOUS at 15:39

## 2024-07-03 RX ADMIN — LIDOCAINE HYDROCHLORIDE 80 MG: 20 INJECTION, SOLUTION INTRAVENOUS at 14:40

## 2024-07-03 RX ADMIN — ROCURONIUM BROMIDE 50 MG: 10 INJECTION, SOLUTION INTRAVENOUS at 14:40

## 2024-07-03 RX ADMIN — PROPOFOL 200 MG: 10 INJECTION, EMULSION INTRAVENOUS at 14:40

## 2024-07-03 RX ADMIN — FENTANYL CITRATE 50 MCG: 50 INJECTION, SOLUTION INTRAMUSCULAR; INTRAVENOUS at 14:40

## 2024-07-03 RX ADMIN — DEXAMETHASONE SODIUM PHOSPHATE 8 MG: 4 INJECTION, SOLUTION INTRAMUSCULAR; INTRAVENOUS at 14:53

## 2024-07-03 RX ADMIN — FENTANYL CITRATE 50 MCG: 50 INJECTION, SOLUTION INTRAMUSCULAR; INTRAVENOUS at 17:26

## 2024-07-03 RX ADMIN — SUGAMMADEX 200 MG: 100 INJECTION, SOLUTION INTRAVENOUS at 16:10

## 2024-07-03 RX ADMIN — Medication 200 MCG: at 15:19

## 2024-07-03 RX ADMIN — DEXMEDETOMIDINE HYDROCHLORIDE 40 MCG: 100 INJECTION, SOLUTION, CONCENTRATE INTRAVENOUS at 14:45

## 2024-07-03 RX ADMIN — Medication 200 MCG: at 15:28

## 2024-07-03 RX ADMIN — Medication 10 ML: at 20:56

## 2024-07-03 RX ADMIN — PROPOFOL 70 MCG/KG/MIN: 10 INJECTION, EMULSION INTRAVENOUS at 14:27

## 2024-07-03 RX ADMIN — SODIUM CHLORIDE, POTASSIUM CHLORIDE, SODIUM LACTATE AND CALCIUM CHLORIDE 9 ML/HR: 600; 310; 30; 20 INJECTION, SOLUTION INTRAVENOUS at 11:49

## 2024-07-03 RX ADMIN — DEXMEDETOMIDINE HYDROCHLORIDE 10 MCG: 100 INJECTION, SOLUTION, CONCENTRATE INTRAVENOUS at 14:32

## 2024-07-03 RX ADMIN — Medication 200 MCG: at 15:41

## 2024-07-03 RX ADMIN — ONDANSETRON HYDROCHLORIDE 4 MG: 2 SOLUTION INTRAMUSCULAR; INTRAVENOUS at 16:00

## 2024-07-03 RX ADMIN — FENTANYL CITRATE 25 MCG: 50 INJECTION, SOLUTION INTRAMUSCULAR; INTRAVENOUS at 17:24

## 2024-07-03 RX ADMIN — Medication 200 MCG: at 15:53

## 2024-07-03 RX ADMIN — PROPOFOL 50 MCG/KG/MIN: 10 INJECTION, EMULSION INTRAVENOUS at 14:40

## 2024-07-03 RX ADMIN — FENTANYL CITRATE 25 MCG: 50 INJECTION, SOLUTION INTRAMUSCULAR; INTRAVENOUS at 14:50

## 2024-07-03 RX ADMIN — TRAZODONE HYDROCHLORIDE 300 MG: 100 TABLET ORAL at 20:56

## 2024-07-03 RX ADMIN — Medication 100 MCG: at 14:53

## 2024-07-03 RX ADMIN — HYDROCODONE BITARTRATE AND ACETAMINOPHEN 1 TABLET: 5; 325 TABLET ORAL at 19:52

## 2024-07-03 RX ADMIN — FENTANYL CITRATE 25 MCG: 50 INJECTION, SOLUTION INTRAMUSCULAR; INTRAVENOUS at 17:27

## 2024-07-03 RX ADMIN — IPRATROPIUM BROMIDE AND ALBUTEROL SULFATE 3 ML: .5; 3 SOLUTION RESPIRATORY (INHALATION) at 19:23

## 2024-07-03 NOTE — ANESTHESIA POSTPROCEDURE EVALUATION
Patient: Pavan Perales    Procedure Summary       Date: 07/03/24 Room / Location: [facility] OR 04 / [facility] MAIN OR    Anesthesia Start: 1432 Anesthesia Stop: 1628    Procedure: BRONCHOSCOPY WITH ION ROBOT, REBUS, EBUS, NEEDLE ASPIRATE, BAL, WASHINGS, BRUSHINGS, BIOPSIES (Right: Bronchus) Diagnosis:       Lung nodule      (Lung nodule [R91.1])    Surgeons: Prieto Vogel MD Provider: Pavan Salmeron MD    Anesthesia Type: general ASA Status: 3            Anesthesia Type: general    Vitals  Vitals Value Taken Time   BP 98/61 07/03/24 1709   Temp 36.1 °C (97 °F) 07/03/24 1700   Pulse 69 07/03/24 1712   Resp 18 07/03/24 1700   SpO2 94 % 07/03/24 1712   Vitals shown include unfiled device data.        Post Anesthesia Care and Evaluation    Patient location during evaluation: bedside  Patient participation: complete - patient participated  Level of consciousness: awake    Airway patency: patent  PONV Status: none  Cardiovascular status: acceptable  Respiratory status: acceptable  Hydration status: acceptable    Comments: Right chest discomfort, low SaO2, and decreased right breath sounds in PACU prompted CXR which showed pneumothorax. Dr Vogel inserted a pleura-evac.

## 2024-07-03 NOTE — OP NOTE
Bronchoscopy Procedure Note    Procedure:    1. Robotic bronchoscopy with Ion procedure  2. Robotic bronchoscopy with radial EBUS guided lung nodule aspiration  3. Robotic bronchoscopy with radial EBUS guided transbronchial biopsy right upper lobe lung nodule  4. Bronchoalveolar lavage right upper lobe with fluroscopy  5.  Robotic bronchoscopy with radial EBUS guided endobronchial brushing right upper lobe  6.  Endobronchial ultrasound-guided lymph node biopsy  7. Bronchial washings tracheobronchial tree  8. Airway inspection    Pre-Operative Diagnosis: Right upper lobe lung nodule    Post-Operative Diagnosis: Same, mildly enlarged right hilar lymph node    Indication: Right upper lobe lung nodule    Anesthesia: General anesthesia, sedated and paralyzed with anesthesia    Procedure Details: Patient was consented for the procedure with all risks and benefits of the procedure explained in detail. Patient was given the opportunity to ask questions and all concerns were answered.  Patient was intubated with size 8.5 ET tube and placed on invasive mechanical ventilator.  Patient was sedated and paralyzed on invasive mechanical ventilator with anesthesia service.    Then regular bronchoscopy was used to suction the secretions through the ET tube.  ET tube was ending in mid trachea.  Robotic bronchoscopy with Ion was started with planning and registration of the patient, navigational mapping then procedure done with radial probe ultrasound and fluoroscopy.  The right upper lobe lung nodule was mapped and navigated with the robotic bronchoscopy.  Fine-needle aspiration biopsy and forceps biopsy were done of the right upper lobe lung nodule area with the help of navigational bronchoscopy, radial probe ultrasound and fluoroscopy.  Endobronchial brushing was done with radial probe ultrasound guidance and fluoroscopy on right upper lobe lung nodule area.  Bronchoalveolar lavage was collected from right upper lobe area with  injection of 20 cc saline.  Then scope was switched to endobronchial ultrasound bronchoscope (EBUS), which was inserted into the main airway via the ET tube. An anatomical and mediastinal survey was done of the main airways and the subsegmental bronchus with EBUS scope.  Mildly enlarged right hilar lymph node was seen. Transbronchial aspiration biopsy of lymph node right hilar (10R station) with 4 passes was done. Then scope was changed to regular bronchoscope, airway inspection was done.  No active bleeding was seen.  Bronchial washing was obtained from entire tracheobronchial tree.  Post procedure no active bleeding was seen.    Estimated Blood Loss: 2 mL      Specimens:  -Fine-needle aspiration biopsy right upper lobe lung nodule  -Transbronchial forceps biopsy right upper lobe lung nodule  - Cytology specimen from right hilar lymph node station  -20 cc bronchoalveolar lavage right upper lobe  - Bronchial washing tracheobronchial tree      Complications: No complications during or after the procedure.    Disposition: To home, if stable in recovery. Follow up in clinic in a week.    Patient tolerated the procedure well.    Electronically signed by Prieto Vogel MD, 07/03/24, 4:30 PM EDT.

## 2024-07-03 NOTE — ANESTHESIA PREPROCEDURE EVALUATION
Anesthesia Evaluation     Patient summary reviewed and Nursing notes reviewed   no history of anesthetic complications:   NPO Solid Status: > 8 hours  NPO Liquid Status: > 2 hours           Airway   Mallampati: I  TM distance: >3 FB  Neck ROM: full  No difficulty expected  Dental      Pulmonary - normal exam    breath sounds clear to auscultation  (+) pulmonary embolism, a smoker,  Cardiovascular - normal exam  Exercise tolerance: good (4-7 METS)    Rhythm: regular  Rate: normal    (+) hypertension, hyperlipidemia      Neuro/Psych- negative ROS  GI/Hepatic/Renal/Endo    (+) GERD    Musculoskeletal     (+) back pain  Abdominal    Substance History      OB/GYN          Other   arthritis,     ROS/Med Hx Other: PAT Nursing Notes unavailable.            a 23 x 18 mm irregular subpleural nodule anteriorly in the right lung apex this is concerning for neoplastic nodule and is new since 8/29/2023.          Anesthesia Plan    ASA 3     general     (Patient understands anesthesia not responsible for dental damage.)  intravenous induction     Anesthetic plan, risks, benefits, and alternatives have been provided, discussed and informed consent has been obtained with: patient.    Plan discussed with CRNA.        CODE STATUS:

## 2024-07-03 NOTE — NURSING NOTE
Patient noted to have sudden onset of diminished breath sounds to right side with spo2 in the mid to high 80's. Patient placed on 4L per NC and CXR order placed as per anesthesia.

## 2024-07-03 NOTE — NURSING NOTE
Gurjit man chest tube placed by Dr. Vogel to right lateral chest and hooked to 20 cm suction. Dry chest tube to water seal. Initial large leak noted but at the time of this note, very small occasional leak noted. Tidaling with inspiration noted as well. Patient denies pain and patient once again has somewhat diminished but present breath sounds to right side.

## 2024-07-03 NOTE — PLAN OF CARE
Goal Outcome Evaluation:  Plan of Care Reviewed With: patient        Progress: improving  Outcome Evaluation: admit from PACU for a pneumo, chest tube to lright side and hooked to suction, IV saline locked, VSS, hooked to cont pulse ox and ETOC, scd on, plan of car continues

## 2024-07-03 NOTE — CONSULTS
Pulmonary / Critical Care Consult Note      Patient Name: Pavan Perales  : 1965  MRN: [id number]  Primary Care Physician:  Maite Montoya DO  Referring Physician: Prieto Vogel MD  Date of admission: 7/3/2024    Subjective   Subjective     Reason for Consult/ Chief Complaint: Iatrogenic pneumothorax status post robotic bronchoscopy    HPI:  Pavan Perales is a 59 y.o. male with history of smoking for very few years when he was young, secondhand smoking exposure from her father and had a family history of cancer in his dad, had CT scan of the chest on 2024 which showed 2.3 x 1.8 cm irregular subpleural nodule anteriorly in the right lung apex concerning for neoplastic nodule.  It was new compared to 2023.  There was mildly prominent paratracheal lymph node measuring up to 12 mm in size.  Patient underwent bronchoscopy with robotic procedure, had pneumothorax postprocedure.  Chest tube was placed at bedside and is admitted to [facility] for observation.  I had put the chest tube in with persistent hypoxic respiratory failure and chest discomfort.    Review of Systems  General:  No Fatigue, No Fever  HEENT: No dysphagia, No Visual Changes, no rhinorrhea  Respiratory:  + cough,+Dyspnea, no phlegm, No Pleuritic Pain, no wheezing, no hemoptysis  Cardiovascular: Denies chest pain, denies palpitations,+DUR[place]AM, Chest Pressure  Gastrointestinal:  No Abdominal Pain, No Nausea, No Vomiting, No Diarrhea  Genitourinary:  No Dysuria, No Frequency, No Hesitancy  Musculoskeletal: No muscle pain or swelling  Endocrine:  No Heat Intolerance, No Cold Intolerance  Hematologic:  No Bleeding, No Bruising  Psychiatric:  No Anxiety, No Depression  Neurologic:  No Confusion, no Dysarthria, No Headaches  Skin:  No Rash, No Open Wounds        Personal History     Past Medical History:   Diagnosis Date    Back pain     Cancer     skin    Hyperlipidemia     Hypertension     Insomnia     Lung nodule     PTSD  (post-traumatic stress disorder)     no isssues when waking from anesthesia    [place] spotted fever     SYMPTOMS OF AND TREATED AS IF HE HAD IT JUNE 2024    Tinnitus        Past Surgical History:   Procedure Laterality Date    BACK SURGERY      x2 LUMBAR DISC REMOVED    COLONOSCOPY N/A 01/03/2023    Procedure: COLONOSCOPY WITH POLYPECTOMY;  Surgeon: Jose Hopson MD;  Location: formerly [facility] ENDOSCOPY;  Service: General;  Laterality: N/A;  COLON POLYP, SUBOPTIMAL PREP    ENDOSCOPY N/A 01/03/2023    Procedure: ESOPHAGOGASTRODUODENOSCOPY WITH BIOPSIES, POLYPECTOMIES;  Surgeon: Jose Hopson MD;  Location: formerly [facility] ENDOSCOPY;  Service: General;  Laterality: N/A;  HIATAL HERNIA, GASTRIC POLYPS    KNEE SURGERY Right     SCOPE ACL MCL    OTHER SURGICAL HISTORY      CANCER REMOVAL FACE    SHOULDER ARTHROSCOPY W/ ROTATOR CUFF REPAIR Right 08/17/2023    Procedure: SHOULDER ARTHROSCOPY WITH SUBCROMIAL DECOMPRESSION AND DISTAL CLAVICULECTOMY, OPEN BICEPS TENODESIS;  Surgeon: Marek Gaviria MD;  Location: [facility] OR [facility];  Service: Orthopedics;  Laterality: Right;       Family History: Family history of lung cancer in his dad.    Social History:  reports that he has quit smoking. His smoking use included cigarettes. He has never been exposed to tobacco smoke. His smokeless tobacco use includes snuff. He reports current alcohol use. He reports that he does not currently use drugs.    Home Medications:  albuterol sulfate HFA, atorvastatin, lidocaine, pantoprazole, and traZODone    Allergies:  No Known Allergies    Objective    Objective     Vitals:   Temp:  [97 °F (36.1 °C)-97.9 °F (36.6 °C)] 97 °F (36.1 °C)  Heart Rate:  [66-74] 66  Resp:  [14-21] 18  BP: ()/(51-80) 101/56  Flow (L/min):  [2-6] 2    Physical Exam:  Vital Signs Reviewed   General:  WDWN, Alert, in mild distress, has conversational dyspnea, nasal oxygen.    HEENT:  PERRL, EOMI.  OP, nares clear, no sinus tenderness  Neck:  Supple, no JVD, no  thyromegaly  Lymph: no axillary, cervical, supraclavicular lymphadenopathy noted bilaterally  Chest: Poor aeration, dull to percussion right chest, mild increased work of breathing, no wheezing, rhonchi or crackles, diminished breath sounds right side   CV: RRR, no MGR, pulses 2+, equal.  Abd:  Soft, NT, ND, + BS, no HSM  EXT:  no clubbing, no cyanosis, no edema, no joint tenderness  Neuro:  A&Ox3, CN grossly intact, no focal deficits.  Skin: No rashes or lesions noted      Result Review    Result Review:  I have personally reviewed the results from the time of this admission to 7/3/2024 17:42 EDT and agree with these findings:  [x]  Laboratory  [x]  Microbiology  [x]  Radiology  [x]  EKG/Telemetry   [x]  Cardiology/Vascular   []  Pathology  []  Old records  []  Other:  Most notable findings include:         XR Chest 1 View    Result Date: 7/3/2024  XR CHEST 1 VW Date of Exam: 7/3/2024 5:15 PM EDT Indication: post op SOA Comparison: Chest CT without contrast dated 6/25/2024 Findings: There is a moderate to large right-sided pneumothorax. The known 2.3 cm nodule in the anterior right upper lobe is again noted in the partially collapsed lung. There is no mediastinal shift. No pleural fluid collections. The left lung is radiographically  clear. Cardiac mediastinal and hilar silhouettes are within range of normal. No acute bony abnormality.     Impression: Impression: 1.Moderate to large right-sided pneumothorax. No mediastinal shift. 2.2.3 cm right upper lobe nodule is again noted, much better demonstrated on the recent CT from several days ago. 3.This critical finding of the pneumothorax was called to Dina Mccabe CRNA at 5:30 p.m. on 7/30/2024. Electronically Signed: Vick Wang DO  7/3/2024 5:36 PM EDT  Workstation ID: [id number]     CT Chest Without Contrast Diagnostic    Result Date: 6/24/2024  CT CHEST WO CONTRAST DIAGNOSTIC Date of Exam: 6/24/2024 11:11 AM EDT Indication: Respiratory illness not improving  x 6  weeks. Comparison: Chest x-ray dated 6/15/2024 Technique: Axial CT images were obtained of the chest without contrast administration.  Reconstructed coronal and sagittal images were also obtained. Automated exposure control and iterative construction methods were used. FINDINGS: Thoracic inlet: Unremarkable. Great vessels: The thoracic aorta and proximal arch vessels appear unremarkable. Mediastinum/Jeanie: Several mildly prominent paratracheal lymph nodes are seen, measuring up to 11 mm short axis. The esophagus is unremarkable. Lung parenchyma: 23 x 18 mm irregular subpleural nodule anteriorly within the right lung apex (series 201, image 51). This is concerning for a neoplastic nodule but could potentially be infectious/inflammatory in etiology. This is new since 8/29/2023. Mild bibasilar atelectasis is present. Trachea and airways: The trachea and central airways appear unremarkable. Pleural space: No significant pleural effusion or pneumothorax. Heart and pericardium: Coronary artery calcifications. Otherwise, the heart and pericardium appear unremarkable. Chest wall: No acute osseous lesion is identified. Superficial soft tissues are unremarkable. Upper abdomen: No acute abnormality is identified within the visualized upper abdomen.     Impression: 1.  23 x 18 mm irregular subpleural nodule anteriorly within the right lung apex (series 201, image 51). This is concerning for a neoplastic nodule but could potentially be infectious/inflammatory in etiology. This is new since 8/29/2023. There are also several mildly prominent paratracheal lymph nodes measuring up to 12 mm short axis. Further evaluation with PET/CT may be beneficial. 2.Additional findings as detailed above. Electronically Signed: Chico Christopher MD  6/24/2024 11:30 AM EDT  Workstation ID: [id number]   '    Assessment & Plan   Assessment / Plan     Active Hospital Problems:  Active Hospital Problems    Diagnosis     **Lung nodule     Pneumothorax           Impression:  Moderate to large right-sided pneumothorax status post bronchoscopy  Robotic bronchoscopy for right upper lobe lung nodule  Family history of colon cancer in his dad    Plan:  Risk and benefits of the procedure was discussed in detail.  Alternatives and options were reviewed.  Risks including local site infection and bleeding was reviewed.  He was agreeable.  Chest tube was placed at bedside.  Will keep chest tube to suction at -20 cm of water  Incentive spirometer and flutter valve  DuoNeb 4 times daily.  Chest x-ray now and Repeat chest x-ray in the morning.  As needed morphine for pain control.  Discussed case with Dr. Olmedo.    I have reviewed labs, imaging, pertinent clinical data and provider notes.   I have discussed with bedside nurse and primary service.       Electronically signed by Prieto Vogel MD, 7/3/2024, 17:42 EDT.

## 2024-07-03 NOTE — ADDENDUM NOTE
Addendum  created 07/03/24 1735 by Dina Mccabe CRNA    Intraprocedure Event edited, Intraprocedure Meds edited

## 2024-07-03 NOTE — H&P
[facility] HISTORY AND PHYSICAL  Date: 7/3/2024   Patient Name: Pavan Perales  : 1965  MRN: [id number]  Primary Care Physician:  Maite Montoya DO  Date of admission: 7/3/2024    Subjective   Subjective     Chief Complaint: Pneumothorax    HPI:    Pavan Perales is a 59 y.o. male past medical history of skin cancer, dyslipidemia, hypertension, lung nodule, and PTSD who presents to the hospital today from bronchoscopy due to right upper lobe nodule and has been admitted for pneumothorax    Patient was admitted today for right upper lung nodule that was significant size.  I am bronchoscopy was done and he tolerated the procedure well.  He was in recovery but his oxygen saturations dropped and he had diminished lung sounds on the right side.  It was determined that he had a pneumothorax on the right side.  Pulmonology was consulted.  When I arrived there at the bedside preparing to place a chest tube.  The patient was not in respiratory distress.  However he was hypoxic.    Patient's other vital signs within normal limits.  Temperature is 97, pulse 60, respiratory is 18, blood pressure 101/56.  Patient admitted to the hospital for acute hypoxic respiratory failure due to pneumothorax.      Personal History     Past Medical History:  Skin cancer  Dyslipidemia   hypertension  Lung nodule concerning for malignancy  PTSD  [place] spotted fever    Past Surgical History:  Back surgery   colonoscopy  Endoscopy   Knee surgery  Shoulder arthroscopy  Bronchoscopy    Family History:   No known family history of cancer    Social History:   Former smoker  Rare alcohol    Home Medications:  albuterol sulfate HFA, atorvastatin, lidocaine, pantoprazole, and traZODone    Allergies:  No Known Allergies        Objective   Objective     Vitals:   Temp:  [97 °F (36.1 °C)-97.9 °F (36.6 °C)] 97 °F (36.1 °C)  Heart Rate:  [66-74] 66  Resp:  [14-21] 18  BP: ()/(51-80) 101/56  Flow (L/min):  [2-6]  2    Physical Exam    Constitutional: Awake, alert, no acute distress   Eyes: Pupils equal, sclerae anicteric, no conjunctival injection   HENT: NCAT, mucous membranes moist   Neck: Supple, no thyromegaly, no lymphadenopathy, trachea midline   Respiratory: Sitting in bed comfortably with oxygen.  No respiratory distress   Cardiovascular: RRR,    Gastrointestinal: nondistended   Musculoskeletal: No bilateral ankle edema, no clubbing or cyanosis to extremities   Psychiatric: Appropriate affect, cooperative   Neurologic: Oriented x 3, strength symmetric in all extremities, Cranial Nerves grossly intact to confrontation, speech clear   Skin: No rashes     Result Review    Result Review:  I have personally reviewed the results from the time of this admission to 7/3/2024 17:24 EDT and agree with these findings:  Imaging and labs reviewed:      Assessment & Plan   Assessment / Plan     Assessment/Plan:   Post Ion bronchoscopy pneumothorax  Acute hypoxic respiratory failure due to pneumothorax  Lung nodule concerning for malignancy    Plan:  --Admit to hospital service  -- Consult pulmonology  -- Repeat chest x-ray in the morning  -- CBC/CMP/mag in the morning  -- Ox for saturations less than 90%  -- Follow-up on pathology from lung nodule  -- Follow-up on pneumonia panel      VTE Prophylaxis:  Heparin every 12        CODE STATUS:     Full code    Admission Status:  I believe this patient meets observation status.    Electronically signed by Drake Olmedo MD, 07/03/24, 5:24 PM EDT.

## 2024-07-04 ENCOUNTER — APPOINTMENT (OUTPATIENT)
Dept: GENERAL RADIOLOGY | Facility: HOSPITAL | Age: 59
DRG: 166 | End: 2024-07-04
Payer: OTHER GOVERNMENT

## 2024-07-04 ENCOUNTER — READMISSION MANAGEMENT (OUTPATIENT)
Dept: CALL CENTER | Facility: HOSPITAL | Age: 59
End: 2024-07-04
Payer: OTHER GOVERNMENT

## 2024-07-04 VITALS
OXYGEN SATURATION: 95 % | WEIGHT: 196.21 LBS | BODY MASS INDEX: 27.47 KG/M2 | HEIGHT: 71 IN | DIASTOLIC BLOOD PRESSURE: 60 MMHG | RESPIRATION RATE: 18 BRPM | TEMPERATURE: 98.2 F | HEART RATE: 78 BPM | SYSTOLIC BLOOD PRESSURE: 111 MMHG

## 2024-07-04 LAB
ALBUMIN SERPL-MCNC: 3.5 G/DL (ref 3.5–5.2)
ALBUMIN/GLOB SERPL: 1.5 G/DL
ALP SERPL-CCNC: 124 U/L (ref 39–117)
ALT SERPL W P-5'-P-CCNC: 69 U/L (ref 1–41)
ANION GAP SERPL CALCULATED.3IONS-SCNC: 10.7 MMOL/L (ref 5–15)
AST SERPL-CCNC: 38 U/L (ref 1–40)
BASOPHILS # BLD AUTO: 0.01 10*3/MM3 (ref 0–0.2)
BASOPHILS NFR BLD AUTO: 0.2 % (ref 0–1.5)
BILIRUB SERPL-MCNC: 0.4 MG/DL (ref 0–1.2)
BUN SERPL-MCNC: 11 MG/DL (ref 6–20)
BUN/CREAT SERPL: 12 (ref 7–25)
CALCIUM SPEC-SCNC: 8.4 MG/DL (ref 8.6–10.5)
CHLORIDE SERPL-SCNC: 101 MMOL/L (ref 98–107)
CO2 SERPL-SCNC: 21.3 MMOL/L (ref 22–29)
CREAT SERPL-MCNC: 0.92 MG/DL (ref 0.76–1.27)
DEPRECATED RDW RBC AUTO: 41.1 FL (ref 37–54)
EGFRCR SERPLBLD CKD-EPI 2021: 95.8 ML/MIN/1.73
EOSINOPHIL # BLD AUTO: 0 10*3/MM3 (ref 0–0.4)
EOSINOPHIL NFR BLD AUTO: 0 % (ref 0.3–6.2)
ERYTHROCYTE [DISTWIDTH] IN BLOOD BY AUTOMATED COUNT: 12.7 % (ref 12.3–15.4)
GLOBULIN UR ELPH-MCNC: 2.4 GM/DL
GLUCOSE SERPL-MCNC: 197 MG/DL (ref 65–99)
GRAM STN SPEC: NORMAL
GRAM STN SPEC: NORMAL
HCT VFR BLD AUTO: 38.1 % (ref 37.5–51)
HGB BLD-MCNC: 12.4 G/DL (ref 13–17.7)
IMM GRANULOCYTES # BLD AUTO: 0.02 10*3/MM3 (ref 0–0.05)
IMM GRANULOCYTES NFR BLD AUTO: 0.4 % (ref 0–0.5)
LYMPHOCYTES # BLD AUTO: 0.31 10*3/MM3 (ref 0.7–3.1)
LYMPHOCYTES NFR BLD AUTO: 5.8 % (ref 19.6–45.3)
MAGNESIUM SERPL-MCNC: 2 MG/DL (ref 1.6–2.6)
MCH RBC QN AUTO: 28.7 PG (ref 26.6–33)
MCHC RBC AUTO-ENTMCNC: 32.5 G/DL (ref 31.5–35.7)
MCV RBC AUTO: 88.2 FL (ref 79–97)
MONOCYTES # BLD AUTO: 0.09 10*3/MM3 (ref 0.1–0.9)
MONOCYTES NFR BLD AUTO: 1.7 % (ref 5–12)
NEUTROPHILS NFR BLD AUTO: 4.95 10*3/MM3 (ref 1.7–7)
NEUTROPHILS NFR BLD AUTO: 91.9 % (ref 42.7–76)
NIGHT BLUE STAIN TISS: NORMAL
NRBC BLD AUTO-RTO: 0 /100 WBC (ref 0–0.2)
PLATELET # BLD AUTO: 133 10*3/MM3 (ref 140–450)
PMV BLD AUTO: 9.9 FL (ref 6–12)
POTASSIUM SERPL-SCNC: 3.9 MMOL/L (ref 3.5–5.2)
PROT SERPL-MCNC: 5.9 G/DL (ref 6–8.5)
RBC # BLD AUTO: 4.32 10*6/MM3 (ref 4.14–5.8)
SODIUM SERPL-SCNC: 133 MMOL/L (ref 136–145)
WBC NRBC COR # BLD AUTO: 5.38 10*3/MM3 (ref 3.4–10.8)

## 2024-07-04 PROCEDURE — 94618 PULMONARY STRESS TESTING: CPT

## 2024-07-04 PROCEDURE — 99238 HOSP IP/OBS DSCHRG MGMT 30/<: CPT | Performed by: FAMILY MEDICINE

## 2024-07-04 PROCEDURE — 94664 DEMO&/EVAL PT USE INHALER: CPT

## 2024-07-04 PROCEDURE — 71045 X-RAY EXAM CHEST 1 VIEW: CPT

## 2024-07-04 PROCEDURE — 94799 UNLISTED PULMONARY SVC/PX: CPT

## 2024-07-04 PROCEDURE — 25010000002 HEPARIN (PORCINE) PER 1000 UNITS: Performed by: INTERNAL MEDICINE

## 2024-07-04 PROCEDURE — 83036 HEMOGLOBIN GLYCOSYLATED A1C: CPT | Performed by: FAMILY MEDICINE

## 2024-07-04 PROCEDURE — 80053 COMPREHEN METABOLIC PANEL: CPT | Performed by: INTERNAL MEDICINE

## 2024-07-04 PROCEDURE — 0W9930Z DRAINAGE OF RIGHT PLEURAL CAVITY WITH DRAINAGE DEVICE, PERCUTANEOUS APPROACH: ICD-10-PCS | Performed by: INTERNAL MEDICINE

## 2024-07-04 PROCEDURE — 83735 ASSAY OF MAGNESIUM: CPT | Performed by: INTERNAL MEDICINE

## 2024-07-04 PROCEDURE — 32551 INSERTION OF CHEST TUBE: CPT | Performed by: INTERNAL MEDICINE

## 2024-07-04 PROCEDURE — 85025 COMPLETE CBC W/AUTO DIFF WBC: CPT | Performed by: INTERNAL MEDICINE

## 2024-07-04 PROCEDURE — 94761 N-INVAS EAR/PLS OXIMETRY MLT: CPT

## 2024-07-04 PROCEDURE — 99232 SBSQ HOSP IP/OBS MODERATE 35: CPT | Performed by: INTERNAL MEDICINE

## 2024-07-04 RX ADMIN — IPRATROPIUM BROMIDE AND ALBUTEROL SULFATE 3 ML: .5; 3 SOLUTION RESPIRATORY (INHALATION) at 11:38

## 2024-07-04 RX ADMIN — PANTOPRAZOLE SODIUM 40 MG: 40 TABLET, DELAYED RELEASE ORAL at 05:32

## 2024-07-04 RX ADMIN — HYDROCODONE BITARTRATE AND ACETAMINOPHEN 1 TABLET: 5; 325 TABLET ORAL at 03:42

## 2024-07-04 RX ADMIN — HEPARIN SODIUM 5000 UNITS: 5000 INJECTION INTRAVENOUS; SUBCUTANEOUS at 08:29

## 2024-07-04 RX ADMIN — IPRATROPIUM BROMIDE AND ALBUTEROL SULFATE 3 ML: .5; 3 SOLUTION RESPIRATORY (INHALATION) at 07:01

## 2024-07-04 RX ADMIN — Medication 10 ML: at 08:30

## 2024-07-04 NOTE — PROCEDURES
Late entry: Chest tube was placed on 7/3/2024    Chest Tube Placement Procedure Note     Indication: Pneumothorax, s/p bronch with biopsy     Consent obtained: Yes, placed in chart     Time Out: performed at bedside.     Pre-Medication: Local lidocaine     Procedure: The patient was placed in a propped up position.  The right chest was prepped with chlorprep and draped.  Local anesthesia over the insertion site was applied with 1% lidocaine.  An incision was made in the 5th rib space along the mid axillary line. Blunt dissection up and over the rib was performed until access was obtained into the pleural cavity.  A 14 Sami chest tube was placed and connected to pleurovac.  Initial output from the tube was air. The tube was sutured in place at the skin and the site was covered with an occlusive dressing.  A chest x-ray was obtained to evaluate placement which showed improvement in pneumothorax.     The patient tolerated the procedure well.     Complications: None

## 2024-07-04 NOTE — PROGRESS NOTES
Walking Oximetry Progress Note      Patient Name:  Pavan Perales  YOB: 1965  Date of Procedure: 07/04/24              ROOM AIR BASELINE   SpO2% 92   Heart Rate 78     EXERCISE ON ROOM AIR SpO2% EXERCISE ON O2 LPM SpO2%   1 MINUTE 92 1 MINUTE     2 MINUTES 94 2 MINUTES     3 MINUTES 92 3 MINUTES     4 MINUTES 93 4 MINUTES     5 MINUTES 94 5 MINUTES     6 MINUTES 93 6 MINUTES                Time to Recover 1 minute   SpO2% Post Exercise  92 on RA.    HR Post Exercise  80     Comments: Patient tolerated exertion well. Patient was found on RA at a SpO2 92%. During the walk the patient maintained an SpO2 of 92%-94%. After the walk the patient recovered quickly and the patients SpO2 was 93%. Patient required no supplemental oxygen and was left on room air.           Electronically signed by Guillermina Guillen RT Student, 07/04/24, 1:46 PM EDT.

## 2024-07-04 NOTE — PROGRESS NOTES
Pulmonary / Critical Care Progress Note      Patient Name: Pavan Perales  : 1965  MRN: [id number]  Primary Care Physician:  Maite Montoya DO  Date of admission: 7/3/2024    Subjective   Subjective   Follow-up for iatrogenic pneumothorax s/p robotic bronchoscopy    Over past 24 hours: Iatrogenic pneumothorax s/p robotic bronchoscopy, chest tube placed to -20 cm LWS, repeat CXR with no residual pneumothorax.    No acute events overnight.     This morning,   Resting in bed  Currently on 2 L nasal cannula  Right-sided chest tube remains in place to -20 cm LWS  A.m. CXR without pneumothorax  Chest tube clamped  Denies dyspnea or chest pain        Objective   Objective     Vitals:   Temp:  [97 °F (36.1 °C)-98.4 °F (36.9 °C)] 97.3 °F (36.3 °C)  Heart Rate:  [59-74] 66  Resp:  [14-21] 18  BP: ()/(51-80) 109/62  Flow (L/min):  [2-6] 2    Physical Exam   Vital Signs Reviewed   General:  WDWN, Alert, NAD, resting in bed.    Chest:  good aeration, clear to auscultation bilaterally, no work of breathing noted on 2 L NC  CV: RRR, no MGR, pulses 2+, equal.  Abd:  Soft, NT, ND, + BS, no HSM  EXT:  no clubbing, no cyanosis, no edema  Neuro:  A&Ox3, CN grossly intact, no focal deficits.  Skin: No rashes or lesions noted      Result Review    Result Review:  I have personally reviewed the results from the time of this admission to 2024 06:39 EDT and agree with these findings:  [x]  Laboratory  [x]  Microbiology  [x]  Radiology  []  EKG/Telemetry   []  Cardiology/Vascular   []  Pathology  []  Old records  []  Other:  Most notable findings include:         Lab 24  0424   WBC 5.38   HEMOGLOBIN 12.4*   HEMATOCRIT 38.1   PLATELETS 133*   SODIUM 133*   POTASSIUM 3.9   CHLORIDE 101   CO2 21.3*   BUN 11   CREATININE 0.92   GLUCOSE 197*   CALCIUM 8.4*   TOTAL PROTEIN 5.9*   ALBUMIN 3.5   GLOBULIN 2.4     XR Chest 1 View    Result Date: 2024  XR CHEST 1 VW-  Date of Exam: 2024 5:30 AM  Indications: imaging  f/u of right pneumothorax s/p chest tube placement; r91.1-h/o solitary pulmonary nodule; r91.1-solitary pulmonary nodule; recent lung bx  Comparisons: 7/3/2024; 6/24/2024.  FINDINGS: A single AP upright portable chest radiograph reveals a small-bore right chest tube in place, similar to the prior study. Its very distal pigtail portion has become somewhat unformed relative to the prior exam from 7/3/2024, however. Minimal, if any, right-sided pneumothorax is seen. No tension pneumothorax. No left-sided pneumothorax. There is a suspected small left pleural effusion. Atelectasis and/or infiltrate(s) is (are) seen in the lung bases with atelectasis favored, greater on the left than the right. There are low lung volumes. No cardiac enlargement. External artifacts obscure detail. Minimal, if any, right pleural effusion is suggested.      Impression: Minimal, if any, right-sided pneumothorax is seen. No significant interval change in the right chest tube position is noted. Please see above comments for further detail.   Please note that portions of this note were completed with a voice recognition program.      Electronically Signed By-Herman Mcnair MD On:7/4/2024 6:13 AM      XR Chest 1 View    Result Date: 7/3/2024  XR CHEST 1 VW Date of Exam: 7/3/2024 5:57 PM EDT Indication: chest tube placement Comparison: AP chest x-ray 7/3/2024, CT chest 6/24/2024 Findings: No residual pneumothorax is seen following placement of right pleural catheter. Lungs appear grossly clear. Cardiomediastinal contours are stable.     Impression: Impression: No residual pneumothorax visualized following placement of right pleural catheter. Electronically Signed: Dina Holt  7/3/2024 6:06 PM EDT  Workstation ID: [id number]    XR Chest 1 View    Result Date: 7/3/2024  XR CHEST 1 VW Date of Exam: 7/3/2024 5:15 PM EDT Indication: post op SOA Comparison: Chest CT without contrast dated 6/25/2024 Findings: There is a moderate to large right-sided  pneumothorax. The known 2.3 cm nodule in the anterior right upper lobe is again noted in the partially collapsed lung. There is no mediastinal shift. No pleural fluid collections. The left lung is radiographically  clear. Cardiac mediastinal and hilar silhouettes are within range of normal. No acute bony abnormality.     Impression: Impression: 1.Moderate to large right-sided pneumothorax. No mediastinal shift. 2.2.3 cm right upper lobe nodule is again noted, much better demonstrated on the recent CT from several days ago. 3.This critical finding of the pneumothorax was called to Dina Mccabe CRNA at 5:30 p.m. on 7/30/2024. Electronically Signed: Vick Wang DO  7/3/2024 5:36 PM EDT  Workstation ID: [id number]     Assessment & Plan   Assessment / Plan     Active Hospital Problems:  Active Hospital Problems    Diagnosis     **Lung nodule     Pneumothorax      Impression:   Moderate to large right-sided pneumothorax status post bronchoscopy  Robotic bronchoscopy for right upper lobe lung nodule  Family history of colon cancer in his dad    Plan:   -Right-sided chest tube in place to -20 cm LWS  -AM CXR with no residual pneumothorax  -Chest tube clamped.  Repeat CXR at 1100 --> without pneumothorax  -Chest tube discontinued  -Encourage use of incentive spirometer and flutter valve  -Continue DuoNeb 4 times daily.  -Continue as needed morphine for pain control.  -Okay to discharge home from pulmonary standpoint  -Follow-up with pulmonology as scheduled on 7/12/2024       VTE Prophylaxis:  Pharmacologic VTE prophylaxis orders are present.        CODE STATUS:   Level Of Support Discussed With: Patient  Code Status (Patient has no pulse and is not breathing): CPR (Attempt to Resuscitate)  Medical Interventions (Patient has pulse or is breathing): Full Support      I personally reviewed pertinent labs, imaging and provider notes. Discussed with bedside nurse and will discuss with primary service.       Electronically  signed by Prieto Vogel MD, 7/4/2024, 06:39 EDT.    Electronically signed by DEMARCUS Butler, 07/04/24, 10:58 AM EDT.    This visit was performed by BOTH a physician and an APC. I personally evaluated and examined the patient. I performed all aspects of MDM as documented. , I have reviewed and confirmed the accuracy of the patient's history as documented in this note., and I have reexamined the patient and the results are consistent with the previously documented exam. I have updated the documentation as necessary.     Electronically signed by Prieto Vogel MD, 07/04/24, 12:35 PM EDT.

## 2024-07-04 NOTE — OUTREACH NOTE
Prep Survey      Flowsheet Row Responses   [facility] facility patient discharged from? [place]   Is LACE score < 7 ? No   Eligibility [facility]   Date of Admission 07/03/24   Date of Discharge 07/04/24   Discharge Disposition Home or Self Care   Discharge diagnosis Lung nodule-Bronchoscopy this visit   Does the patient have one of the following disease processes/diagnoses(primary or secondary)? Other   Does the patient have Home health ordered? No   Is there a DME ordered? No   Prep survey completed? Yes            MERA PAGE - Registered Nurse

## 2024-07-04 NOTE — DISCHARGE SUMMARY
[facility]         HOSPITALIST  DISCHARGE SUMMARY    Patient Name: Pavan Perales  : 1965  MRN: [id number]    Date of Admission: 7/3/2024  Date of Discharge: 2024  Primary Care Physician: Maite Montoya DO    Consults       Date and Time Order Name Status Description    7/3/2024  5:33 PM Inpatient Pulmonology Consult Completed             Active and Resolved Hospital Problems:  Post Ion bronchoscopy pneumothorax  Acute hypoxic respiratory failure due to pneumothorax  Lung nodule concerning for malignancy  Dyslipidemia  PTSD      Active Hospital Problems    Diagnosis POA    **Lung nodule [R91.1] Unknown    Pneumothorax [J93.9] Yes      Resolved Hospital Problems   No resolved problems to display.       Hospital Course     Hospital Course:  Pavan Perales is a 59 y.o. male past medical history of skin cancer, dyslipidemia, hypertension, lung nodule, and PTSD who presents to the hospital following bronchoscopy due to right upper lobe nodule and admitted for pneumothorax with hypoxic respiratory failure postop. Bronchoscopy was done and he tolerated the procedure well initially though when he was in recovery his oxygen saturations dropped and he had diminished lung sounds on the right side.  Patient placed on supplemental oxygen titrate to keep sats greater than 90%.  It was determined that he had a pneumothorax on the right side.  Pulmonology was consulted and chest tube placed with alleviation of pneumothorax on imaging.  Chest tube clamped and repeat chest x-ray again negative for recurrent pneumothorax.  Chest tube pulled by pulmonology.  Patient weaned off of home O2 and passed 6-minute walk test on room air.  Patient seen and evaluated on date of discharge and thought stable for discharge home to follow-up with pulmonology on 2024 as previously scheduled.        DISCHARGE Follow Up Recommendations for labs and diagnostics: As above      Day of Discharge     Vital  Signs:  Temp:  [97 °F (36.1 °C)-98.4 °F (36.9 °C)] 98.2 °F (36.8 °C)  Heart Rate:  [58-74] 74  Resp:  [14-21] 18  BP: ()/(40-75) 111/60  Flow (L/min):  [1-6] 1  Physical Exam:   Gen. well-developed appearing stated age in no acute distress  HEENT: Normocephalic atraumatic moist membranes pupils equal round reactive light, no scleral icterus no conjunctival injection  Cardiovascular: regular rate and rhythm no murmurs rubs or gallops S1-S2, no lower extremity edema appreciated  Pulmonary: Clear to auscultation bilaterally no wheezes rales or rhonchi symmetric chest expansion, unlabored, no conversational dyspnea appreciated, pressure dressing over right lateral chest wall clean dry intact  Gastrointestinal: Soft nontender nondistended positive bowel sounds all 4 quadrants no rebound or guarding  Musculoskeletal: No clubbing cyanosis, warm and well-perfused, calves soft symmetric nontender bilaterally  Skin: Clean dry without rashs  Neuro: Cranial nerves II through XII intact grossly no sensorimotor deficits appreciated bilateral upper and lower extremities  Psych: Patient is calm cooperative and appropriate with exam not responding to internal stimuli  : No Stone catheter no bladder distention no suprapubic tenderness      Discharge Details        Discharge Medications        Continue These Medications        Instructions Start Date   albuterol sulfate  (90 Base) MCG/ACT inhaler  Commonly known as: PROVENTIL HFA;VENTOLIN HFA;PROAIR HFA   2 puffs, Inhalation, Every 6 Hours PRN      atorvastatin 20 MG tablet  Commonly known as: LIPITOR   20 mg, Oral, Daily      lidocaine 5 %  Commonly known as: Lidoderm   1 patch, Transdermal, Every 24 Hours, Remove & Discard patch within 12 hours or as directed by MD      pantoprazole 40 MG EC tablet  Commonly known as: PROTONIX   Oral, Daily      traZODone 100 MG tablet  Commonly known as: DESYREL   300 mg, Oral, Nightly               No Known Allergies    Discharge  Disposition:  Home or Self Care    Diet:  Hospital:  Diet Order   Procedures    Diet: Cardiac; Healthy Heart (2-3 Na+); Fluid Consistency: Thin (IDDSI 0)       Discharge Activity:   Activity Instructions       Gradually Increase Activity Until at Pre-Hospitalization Level              CODE STATUS:  Code Status and Medical Interventions:   Ordered at: 07/03/24 1733     Level Of Support Discussed With:    Patient     Code Status (Patient has no pulse and is not breathing):    CPR (Attempt to Resuscitate)     Medical Interventions (Patient has pulse or is breathing):    Full Support         Future Appointments   Date Time Provider Department Center   7/8/2024  8:00 AM [facility] PET ADMIN RM 1  [facility] PET [facility]   7/8/2024  8:15 AM [facility] PET 1 BH [facility] PET [facility]   7/12/2024 10:15 AM Sidra Higuera APRN [facility] P[facility] ETW [facility]   8/8/2024  8:00 AM SPENCER NM CARD ADMIN RM 1  [facility] NM [facility]       Additional Instructions for the Follow-ups that You Need to Schedule       Discharge Follow-up with Specialty: As scheduled with Pulmonology on 7/12/24   As directed      Specialty: As scheduled with Pulmonology on 7/12/24                Pertinent  and/or Most Recent Results     PROCEDURES:         Prieto Vogel MD   Physician  Pulmonology     Op Note     Signed     Date of Service: 07/03/24 1451  Creation Time: 07/03/24 1625  Case Time: Procedures: Surgeons:   07/03/24 1451 BRONCHOSCOPY WITH ION ROBOT, REBUS, EBUS, NEEDLE ASPIRATE, BAL, WASHINGS, BRUSHINGS, BIOPSIES    Prieto Vogel MD               Signed         Bronchoscopy Procedure Note     Procedure:     1. Robotic bronchoscopy with Ion procedure  2. Robotic bronchoscopy with radial EBUS guided lung nodule aspiration  3. Robotic bronchoscopy with radial EBUS guided transbronchial biopsy right upper lobe lung nodule  4. Bronchoalveolar lavage right upper lobe with fluroscopy  5.  Robotic bronchoscopy with radial EBUS guided endobronchial brushing right upper lobe  6.  Endobronchial  ultrasound-guided lymph node biopsy  7. Bronchial washings tracheobronchial tree  8. Airway inspection     Pre-Operative Diagnosis: Right upper lobe lung nodule     Post-Operative Diagnosis: Same, mildly enlarged right hilar lymph node     Indication: Right upper lobe lung nodule     Anesthesia: General anesthesia, sedated and paralyzed with anesthesia     Procedure Details: Patient was consented for the procedure with all risks and benefits of the procedure explained in detail. Patient was given the opportunity to ask questions and all concerns were answered.  Patient was intubated with size 8.5 ET tube and placed on invasive mechanical ventilator.  Patient was sedated and paralyzed on invasive mechanical ventilator with anesthesia service.    Then regular bronchoscopy was used to suction the secretions through the ET tube.  ET tube was ending in mid trachea.  Robotic bronchoscopy with Ion was started with planning and registration of the patient, navigational mapping then procedure done with radial probe ultrasound and fluoroscopy.  The right upper lobe lung nodule was mapped and navigated with the robotic bronchoscopy.  Fine-needle aspiration biopsy and forceps biopsy were done of the right upper lobe lung nodule area with the help of navigational bronchoscopy, radial probe ultrasound and fluoroscopy.  Endobronchial brushing was done with radial probe ultrasound guidance and fluoroscopy on right upper lobe lung nodule area.  Bronchoalveolar lavage was collected from right upper lobe area with injection of 20 cc saline.  Then scope was switched to endobronchial ultrasound bronchoscope (EBUS), which was inserted into the main airway via the ET tube. An anatomical and mediastinal survey was done of the main airways and the subsegmental bronchus with EBUS scope.  Mildly enlarged right hilar lymph node was seen. Transbronchial aspiration biopsy of lymph node right hilar (10R station) with 4 passes was done. Then scope  was changed to regular bronchoscope, airway inspection was done.  No active bleeding was seen.  Bronchial washing was obtained from entire tracheobronchial tree.  Post procedure no active bleeding was seen.     Estimated Blood Loss: 2 mL        Specimens:  -Fine-needle aspiration biopsy right upper lobe lung nodule  -Transbronchial forceps biopsy right upper lobe lung nodule  - Cytology specimen from right hilar lymph node station  -20 cc bronchoalveolar lavage right upper lobe  - Bronchial washing tracheobronchial tree        Complications: No complications during or after the procedure.     Disposition: To home, if stable in recovery. Follow up in clinic in a week.     Patient tolerated the procedure well.     Electronically signed by Prieto Vogel MD, 07/03/24, 4:30 PM EDT.                    Right sided chest tube placement 7/3/2024 Dr. Vogel performing      LAB RESULTS:      Lab 07/04/24 0424   WBC 5.38   HEMOGLOBIN 12.4*   HEMATOCRIT 38.1   PLATELETS 133*   NEUTROS ABS 4.95   IMMATURE GRANS (ABS) 0.02   LYMPHS ABS 0.31*   MONOS ABS 0.09*   EOS ABS 0.00   MCV 88.2         Lab 07/04/24  0424   SODIUM 133*   POTASSIUM 3.9   CHLORIDE 101   CO2 21.3*   ANION GAP 10.7   BUN 11   CREATININE 0.92   EGFR 95.8   GLUCOSE 197*   CALCIUM 8.4*   MAGNESIUM 2.0         Lab 07/04/24  0424   TOTAL PROTEIN 5.9*   ALBUMIN 3.5   GLOBULIN 2.4   ALT (SGPT) 69*   AST (SGOT) 38   BILIRUBIN 0.4   ALK PHOS 124*                     Brief Urine Lab Results  (Last result in the past 365 days)        Color   Clarity   Blood   Leuk Est   Nitrite   Protein   CREAT   Urine HCG        06/15/24 2131 Yellow   Clear   Negative   Negative   Negative   Negative                 Microbiology Results (last 10 days)       Procedure Component Value - Date/Time    AFB Culture - Wash, Bronchus [[id number]] Collected: 07/03/24 1609    Lab Status: Preliminary result Specimen: Wash from Bronchus Updated: 07/04/24 1050     AFB Stain No acid fast bacilli seen  on direct smear    Respiratory Culture - Wash, Bronchus [[id number]] Collected: 07/03/24 1609    Lab Status: Preliminary result Specimen: Wash from Bronchus Updated: 07/04/24 1144     Respiratory Culture Rare growth The culture consists of normal respiratory francine. This is a preliminary report; final report to follow.     Gram Stain Rare (1+) WBCs seen      No organisms seen    AFB Culture - Lavage, Lung, Right Upper Lobe [[id number]] Collected: 07/03/24 1553    Lab Status: Preliminary result Specimen: Lavage from Lung, Right Upper Lobe Updated: 07/04/24 1047     AFB Stain No acid fast bacilli seen on direct smear    BAL Culture, Quantitative - Lavage, Lung, Right Upper Lobe [[id number]] Collected: 07/03/24 1553    Lab Status: Preliminary result Specimen: Lavage from Lung, Right Upper Lobe Updated: 07/04/24 1144     BAL Culture No growth     Gram Stain Rare (1+) WBCs seen      No organisms seen    Pneumonia Panel - Lavage, Lung, Right Upper Lobe [[id number]]  (Normal) Collected: 07/03/24 1553    Lab Status: Final result Specimen: Lavage from Lung, Right Upper Lobe Updated: 07/03/24 1747     Escherichia coli PCR Not Detected     Acinetobacter calcoaceticus-baumannii complex PCR Not Detected     Enterobacter cloacae PCR Not Detected     Klebsiella oxytoca PCR Not Detected     Klebsiella pneumoniae group PCR Not Detected     Klebsiella aerogenes PCR Not Detected     Moraxella catarrhalis PCR Not Detected     Proteus species PCR Not Detected     Pseudomonas aeroginosa PCR Not Detected     Serratia marcescens PCR Not Detected     Staphylococcus aureus PCR Not Detected     Streptococcus pyogenes PCR Not Detected     Haemophilus influenzae PCR Not Detected     Streptococcus agalactiae PCR Not Detected     Streptococcus pneumoniae PCR Not Detected     Chlamydophila pneumoniae PCR Not Detected     Legionella pneumophilia PCR Not Detected     Mycoplasma pneumo by PCR Not Detected     ADENOVIRUS, PCR Not Detected     CTX-M Gene  N/A     IMP Gene N/A     KPC Gene N/A     mecA/C and MREJ Gene N/A     NDM Gene N/A     OXA-48-like Gene N/A     VIM Gene N/A     Coronavirus Not Detected     Human Metapneumovirus Not Detected     Human Rhinovirus/Enterovirus Not Detected     Influenza A PCR Not Detected     Influenza B PCR Not Detected     RSV, PCR Not Detected     Parainfluenza virus PCR Not Detected    Gram Stain - No Culture [[id number]] Collected: 07/03/24 1529    Lab Status: Final result Specimen: Brushing from Lung, Right Upper Lobe Updated: 07/04/24 0708     Gram Stain Rare (1+) WBCs seen      No organisms seen    AFB Stain - No Culture - Brushing, Lung, Right Upper Lobe [[id number]] Collected: 07/03/24 1529    Lab Status: Final result Specimen: Brushing from Lung, Right Upper Lobe Updated: 07/04/24 1048     AFB Stain No acid fast bacilli seen on direct smear            XR Chest 1 View    Result Date: 7/4/2024  Impression: Impression: 1.Right chest tube appears in stable position. No definite pneumothorax is seen on this exam. 2.Persistent left basilar opacities and possible small left effusion. Electronically Signed: Humberto Taylor  7/4/2024 11:36 AM EDT  Workstation ID: [id number]    XR Chest 1 View    Result Date: 7/4/2024  Impression: Minimal, if any, right-sided pneumothorax is seen. No significant interval change in the right chest tube position is noted. Please see above comments for further detail.   Please note that portions of this note were completed with a voice recognition program.      Electronically Signed By-Herman Mcnair MD On:7/4/2024 6:13 AM      XR Chest 1 View    Result Date: 7/3/2024  Impression: Impression: No residual pneumothorax visualized following placement of right pleural catheter. Electronically Signed: Dina Holt  7/3/2024 6:06 PM EDT  Workstation ID: [id number]    XR Chest 1 View    Result Date: 7/3/2024  Impression: Impression: 1.Moderate to large right-sided pneumothorax. No mediastinal shift. 2.2.3 cm right  upper lobe nodule is again noted, much better demonstrated on the recent CT from several days ago. 3.This critical finding of the pneumothorax was called to Dina Mccabe CRNA at 5:30 p.m. on 7/30/2024. Electronically Signed: Vick Wang DO  7/3/2024 5:36 PM EDT  Workstation ID: [id number]    CT Chest Without Contrast Diagnostic    Result Date: 6/24/2024  Impression: 1.  23 x 18 mm irregular subpleural nodule anteriorly within the right lung apex (series 201, image 51). This is concerning for a neoplastic nodule but could potentially be infectious/inflammatory in etiology. This is new since 8/29/2023. There are also several mildly prominent paratracheal lymph nodes measuring up to 12 mm short axis. Further evaluation with PET/CT may be beneficial. 2.Additional findings as detailed above. Electronically Signed: Chico Christopher MD  6/24/2024 11:30 AM EDT  Workstation ID: [id number]    XR Chest 1 View    Result Date: 6/15/2024  Impression: Impression: Bibasilar atelectasis due to incomplete inspiration. No suspicious infiltrate Electronically Signed: Ken Wood  6/15/2024 6:58 PM EDT  Workstation ID: [id number]     Results for orders placed during the hospital encounter of 08/29/23    Duplex Venous Upper Extremity - Right CV-READ    Interpretation Summary    Suboptimal axillary vein compression images.  No obvious thrombus identified.    All other right sided vessels appear normal.      Results for orders placed during the hospital encounter of 08/29/23    Duplex Venous Upper Extremity - Right CV-READ    Interpretation Summary    Suboptimal axillary vein compression images.  No obvious thrombus identified.    All other right sided vessels appear normal.      Results for orders placed during the hospital encounter of 08/29/23    Adult Transthoracic Echo Complete W/ Cont if Necessary Per Protocol    Interpretation Summary    Left ventricular ejection fraction appears to be 56 - 60%.    Left ventricular diastolic function  was normal.    There were no apparent intracardiac masses, vegetations or thrombi.      Labs Pending at Discharge:  Pending Labs       Order Current Status    Non-gynecologic Cytology Collected (07/03/24 1519)    Tissue Pathology Exam Collected (07/03/24 1526)    Fungus Culture - Lavage, Lung, Right Upper Lobe In process    Fungus Culture - Wash, Bronchus In process    Hemoglobin A1c In process    AFB Culture - Lavage, Lung, Right Upper Lobe Preliminary result    AFB Culture - Wash, Bronchus Preliminary result    BAL Culture, Quantitative - Lavage, Lung, Right Upper Lobe Preliminary result    Respiratory Culture - Wash, Bronchus Preliminary result              Time spent on Discharge including face to face service: 25 minutes    Electronically signed by Jairo Stein MD, 07/04/24, 1:33 PM EDT.

## 2024-07-04 NOTE — PLAN OF CARE
Goal Outcome Evaluation:                               PRN pain medication given 2 times for chest tube pain. Total chest tube output overnight 3ML. Remains on 2L NC.

## 2024-07-05 ENCOUNTER — TRANSITIONAL CARE MANAGEMENT TELEPHONE ENCOUNTER (OUTPATIENT)
Dept: CALL CENTER | Facility: HOSPITAL | Age: 59
End: 2024-07-05
Payer: OTHER GOVERNMENT

## 2024-07-05 LAB
BACTERIA SPEC AEROBE CULT: NO GROWTH
BACTERIA SPEC RESP CULT: NORMAL
GRAM STN SPEC: NORMAL
HBA1C MFR BLD: 5.9 % (ref 4.8–5.6)

## 2024-07-05 NOTE — OUTREACH NOTE
Call Center TCM Note      Flowsheet Row Responses   [facility] patient discharged from? [place]   Does the patient have one of the following disease processes/diagnoses(primary or secondary)? Other   TCM attempt successful? Yes   Call start time 1643   Call end time 1647   Is patient permission given to speak with other caregiver? Yes   Person spoke with today (if not patient) and relationship Wife-Janelle.   Meds reviewed with patient/caregiver? Yes   Is the patient having any side effects they believe may be caused by any medication additions or changes? No   Does the patient have all medications ordered at discharge? N/A   Is the patient taking all medications as directed (includes completed medication regime)? Yes   Comments Spouse states wishes to keep pulmonology appt for 07/12/24, then will arrange f/u appt with PCP. PET scan on 07/08/24.   Does the patient have an appointment with their PCP within 7-14 days of discharge? No   Nursing Interventions Patient desires to follow up with specialty only, Routed TCM call to PCP office   Has home health visited the patient within 72 hours of discharge? N/A   Psychosocial issues? No   Did the patient receive a copy of their discharge instructions? Yes   Nursing interventions Reviewed instructions with patient   What is the patient's perception of their health status since discharge? Improving   Is the patient/caregiver able to teach back signs and symptoms related to disease process for when to call PCP? Yes   Is the patient/caregiver able to teach back signs and symptoms related to disease process for when to call 911? Yes   Is the patient/caregiver able to teach back the hierarchy of who to call/visit for symptoms/problems? PCP, Specialist, Home health nurse, Urgent Care, ED, 911 Yes   If the patient is a current smoker, are they able to teach back resources for cessation? Not a smoker   TCM call completed? Yes   Wrap up additional comments Patient's spouse states  patient is doing well. Denies any s/s of infection at chest tube site. Denies any fever, SOA, or chest pain. Discussed pulse oximeter, and where can be purchased. Denies any needs or concerns today. States will have PET scan on Monday. TCM complete.   Call end time 1647   Would this patient benefit from a Referral to [facility] Social Work? No   Is the patient interested in additional calls from an ambulatory ? No            Savannah Ramon RN    7/5/2024, 16:49 EDT

## 2024-07-05 NOTE — OUTREACH NOTE
Call Center TCM Note      Flowsheet Row Responses   [facility] patient discharged from? [place]   Does the patient have one of the following disease processes/diagnoses(primary or secondary)? Other   TCM attempt successful? No   Unsuccessful attempts Attempt 1   Call Status Left message            Savannah Ramon RN    7/5/2024, 15:29 EDT

## 2024-07-08 ENCOUNTER — HOSPITAL ENCOUNTER (OUTPATIENT)
Dept: PET IMAGING | Facility: HOSPITAL | Age: 59
Discharge: HOME | End: 2024-07-08
Payer: OTHER GOVERNMENT

## 2024-07-08 DIAGNOSIS — R91.1 PULMONARY NODULE: ICD-10-CM

## 2024-07-08 PROCEDURE — 0 FLUDEOXYGLUCOSE F18 SOLUTION: Performed by: FAMILY MEDICINE

## 2024-07-08 PROCEDURE — A9552 F18 FDG: HCPCS | Performed by: FAMILY MEDICINE

## 2024-07-08 PROCEDURE — 78815 PET IMAGE W/CT SKULL-THIGH: CPT

## 2024-07-08 RX ADMIN — FLUDEOXYGLUCOSE F 18 1 DOSE: 200 INJECTION, SOLUTION INTRAVENOUS at 07:57

## 2024-07-09 ENCOUNTER — HOSPITAL ENCOUNTER (EMERGENCY)
Facility: HOSPITAL | Age: 59
Discharge: HOME | End: 2024-07-09
Attending: EMERGENCY MEDICINE
Payer: OTHER GOVERNMENT

## 2024-07-09 ENCOUNTER — APPOINTMENT (OUTPATIENT)
Dept: GENERAL RADIOLOGY | Facility: HOSPITAL | Age: 59
End: 2024-07-09
Payer: OTHER GOVERNMENT

## 2024-07-09 ENCOUNTER — TELEPHONE (OUTPATIENT)
Dept: PULMONOLOGY | Facility: CLINIC | Age: 59
End: 2024-07-09

## 2024-07-09 VITALS
WEIGHT: 205 LBS | HEIGHT: 70 IN | BODY MASS INDEX: 29.35 KG/M2 | TEMPERATURE: 98.6 F | SYSTOLIC BLOOD PRESSURE: 125 MMHG | DIASTOLIC BLOOD PRESSURE: 85 MMHG | RESPIRATION RATE: 21 BRPM | OXYGEN SATURATION: 94 % | HEART RATE: 86 BPM

## 2024-07-09 DIAGNOSIS — R07.89 NON-CARDIAC CHEST PAIN: Primary | ICD-10-CM

## 2024-07-09 LAB
ALBUMIN SERPL-MCNC: 3.6 G/DL (ref 3.5–5.2)
ALBUMIN/GLOB SERPL: 1.4 G/DL
ALP SERPL-CCNC: 130 U/L (ref 39–117)
ALT SERPL W P-5'-P-CCNC: 57 U/L (ref 1–41)
ANION GAP SERPL CALCULATED.3IONS-SCNC: 10.2 MMOL/L (ref 5–15)
AST SERPL-CCNC: 35 U/L (ref 1–40)
BASOPHILS # BLD AUTO: 0.03 10*3/MM3 (ref 0–0.2)
BASOPHILS NFR BLD AUTO: 0.8 % (ref 0–1.5)
BILIRUB SERPL-MCNC: 0.5 MG/DL (ref 0–1.2)
BUN SERPL-MCNC: 11 MG/DL (ref 6–20)
BUN/CREAT SERPL: 10.5 (ref 7–25)
CALCIUM SPEC-SCNC: 8.4 MG/DL (ref 8.6–10.5)
CHLORIDE SERPL-SCNC: 101 MMOL/L (ref 98–107)
CO2 SERPL-SCNC: 23.8 MMOL/L (ref 22–29)
CREAT SERPL-MCNC: 1.05 MG/DL (ref 0.76–1.27)
CYTO UR: NORMAL
CYTO UR: NORMAL
DEPRECATED RDW RBC AUTO: 40.2 FL (ref 37–54)
EGFRCR SERPLBLD CKD-EPI 2021: 81.8 ML/MIN/1.73
EOSINOPHIL # BLD AUTO: 0.14 10*3/MM3 (ref 0–0.4)
EOSINOPHIL NFR BLD AUTO: 3.8 % (ref 0.3–6.2)
ERYTHROCYTE [DISTWIDTH] IN BLOOD BY AUTOMATED COUNT: 13.1 % (ref 12.3–15.4)
GEN 5 2HR TROPONIN T REFLEX: <6 NG/L
GLOBULIN UR ELPH-MCNC: 2.6 GM/DL
GLUCOSE SERPL-MCNC: 159 MG/DL (ref 65–99)
HCT VFR BLD AUTO: 40.1 % (ref 37.5–51)
HGB BLD-MCNC: 13.6 G/DL (ref 13–17.7)
HOLD SPECIMEN: NORMAL
HOLD SPECIMEN: NORMAL
IMM GRANULOCYTES # BLD AUTO: 0.04 10*3/MM3 (ref 0–0.05)
IMM GRANULOCYTES NFR BLD AUTO: 1.1 % (ref 0–0.5)
LAB AP CASE REPORT: NORMAL
LAB AP CASE REPORT: NORMAL
LAB AP CLINICAL INFORMATION: NORMAL
LAB AP CLINICAL INFORMATION: NORMAL
LAB AP SPECIAL STAINS: NORMAL
LAB AP SPECIAL STAINS: NORMAL
LIPASE SERPL-CCNC: 36 U/L (ref 13–60)
LYMPHOCYTES # BLD AUTO: 0.52 10*3/MM3 (ref 0.7–3.1)
LYMPHOCYTES NFR BLD AUTO: 14.3 % (ref 19.6–45.3)
MAGNESIUM SERPL-MCNC: 1.9 MG/DL (ref 1.6–2.6)
MCH RBC QN AUTO: 29.2 PG (ref 26.6–33)
MCHC RBC AUTO-ENTMCNC: 33.9 G/DL (ref 31.5–35.7)
MCV RBC AUTO: 86.1 FL (ref 79–97)
MONOCYTES # BLD AUTO: 0.35 10*3/MM3 (ref 0.1–0.9)
MONOCYTES NFR BLD AUTO: 9.6 % (ref 5–12)
NEUTROPHILS NFR BLD AUTO: 2.56 10*3/MM3 (ref 1.7–7)
NEUTROPHILS NFR BLD AUTO: 70.4 % (ref 42.7–76)
NRBC BLD AUTO-RTO: 0 /100 WBC (ref 0–0.2)
NT-PROBNP SERPL-MCNC: <36 PG/ML (ref 0–900)
PATH REPORT.FINAL DX SPEC: NORMAL
PATH REPORT.FINAL DX SPEC: NORMAL
PATH REPORT.GROSS SPEC: NORMAL
PATH REPORT.GROSS SPEC: NORMAL
PLATELET # BLD AUTO: 152 10*3/MM3 (ref 140–450)
PMV BLD AUTO: 8.8 FL (ref 6–12)
POTASSIUM SERPL-SCNC: 3.8 MMOL/L (ref 3.5–5.2)
PROT SERPL-MCNC: 6.2 G/DL (ref 6–8.5)
QT INTERVAL: 378 MS
QT INTERVAL: 385 MS
QTC INTERVAL: 438 MS
QTC INTERVAL: 459 MS
RBC # BLD AUTO: 4.66 10*6/MM3 (ref 4.14–5.8)
SODIUM SERPL-SCNC: 135 MMOL/L (ref 136–145)
TROPONIN T DELTA: NORMAL
TROPONIN T SERPL HS-MCNC: <6 NG/L
WBC NRBC COR # BLD AUTO: 3.64 10*3/MM3 (ref 3.4–10.8)
WHOLE BLOOD HOLD COAG: NORMAL
WHOLE BLOOD HOLD SPECIMEN: NORMAL

## 2024-07-09 PROCEDURE — 25010000002 KETOROLAC TROMETHAMINE PER 15 MG: Performed by: EMERGENCY MEDICINE

## 2024-07-09 PROCEDURE — 93005 ELECTROCARDIOGRAM TRACING: CPT | Performed by: EMERGENCY MEDICINE

## 2024-07-09 PROCEDURE — 93005 ELECTROCARDIOGRAM TRACING: CPT

## 2024-07-09 PROCEDURE — 84484 ASSAY OF TROPONIN QUANT: CPT | Performed by: EMERGENCY MEDICINE

## 2024-07-09 PROCEDURE — 83690 ASSAY OF LIPASE: CPT | Performed by: EMERGENCY MEDICINE

## 2024-07-09 PROCEDURE — 96374 THER/PROPH/DIAG INJ IV PUSH: CPT

## 2024-07-09 PROCEDURE — 80053 COMPREHEN METABOLIC PANEL: CPT | Performed by: EMERGENCY MEDICINE

## 2024-07-09 PROCEDURE — 99284 EMERGENCY DEPT VISIT MOD MDM: CPT

## 2024-07-09 PROCEDURE — 93010 ELECTROCARDIOGRAM REPORT: CPT | Performed by: INTERNAL MEDICINE

## 2024-07-09 PROCEDURE — 83880 ASSAY OF NATRIURETIC PEPTIDE: CPT

## 2024-07-09 PROCEDURE — 71045 X-RAY EXAM CHEST 1 VIEW: CPT

## 2024-07-09 PROCEDURE — 85025 COMPLETE CBC W/AUTO DIFF WBC: CPT

## 2024-07-09 PROCEDURE — 36415 COLL VENOUS BLD VENIPUNCTURE: CPT

## 2024-07-09 PROCEDURE — 83735 ASSAY OF MAGNESIUM: CPT | Performed by: EMERGENCY MEDICINE

## 2024-07-09 RX ORDER — KETOROLAC TROMETHAMINE 10 MG/1
10 TABLET, FILM COATED ORAL EVERY 6 HOURS PRN
Qty: 20 TABLET | Refills: 0 | Status: SHIPPED | OUTPATIENT
Start: 2024-07-09

## 2024-07-09 RX ORDER — KETOROLAC TROMETHAMINE 30 MG/ML
30 INJECTION, SOLUTION INTRAMUSCULAR; INTRAVENOUS ONCE
Status: COMPLETED | OUTPATIENT
Start: 2024-07-09 | End: 2024-07-09

## 2024-07-09 RX ORDER — ASPIRIN 81 MG/1
324 TABLET, CHEWABLE ORAL ONCE
Status: COMPLETED | OUTPATIENT
Start: 2024-07-09 | End: 2024-07-09

## 2024-07-09 RX ORDER — SODIUM CHLORIDE 0.9 % (FLUSH) 0.9 %
10 SYRINGE (ML) INJECTION AS NEEDED
Status: DISCONTINUED | OUTPATIENT
Start: 2024-07-09 | End: 2024-07-09 | Stop reason: HOSPADM

## 2024-07-09 RX ADMIN — KETOROLAC TROMETHAMINE 30 MG: 30 INJECTION, SOLUTION INTRAMUSCULAR; INTRAVENOUS at 13:14

## 2024-07-09 RX ADMIN — ASPIRIN 324 MG: 81 TABLET, CHEWABLE ORAL at 11:59

## 2024-07-09 NOTE — DISCHARGE INSTRUCTIONS
Take the Toradol prescription as prescribed for pain control.  Continue the other home medications as previously instructed.  Follow-up with your pulmonary appointment on Friday as scheduled.  Follow your primary care provider as needed.  Return to the ER for fever greater than 101, increasing shortness of breath, uncontrollable pain, or any other concerns issues that may arise.

## 2024-07-09 NOTE — TELEPHONE ENCOUNTER
Caller: Pavan Perales    Relationship to patient: Self    Best call back number: [phone]     Patient is needing: PT WOULD LIKE SOMEONE TO CALL HIM BACK AS HE IS HAVING SOME PAIN IN HIS CHEST AND SOME TROUBLE BREATHING. HE SAID IT IS NOT SOMETHING BAD ENOUGH FOR A ER VISIT BUT WOULD LIKE TO TALK TO SOMEONE

## 2024-07-09 NOTE — TELEPHONE ENCOUNTER
I called pt and informed him that Sidra Higuera advised anytime you have chest pain you should go to the ER. [facility] as well.   Pt stated he would go to the ER.

## 2024-07-09 NOTE — ED PROVIDER NOTES
Time: 3:04 PM EDT  Date of encounter:  7/9/2024  Independent Historian/Clinical History and Information was obtained by:   Patient and Family  Chief Complaint: Shortness of breath and chest discomfort    History is limited by: N/A    History of Present Illness:  Patient is a 59 y.o. year old male who presents to the emergency department for evaluation of shortness of breath and chest discomfort.  Patient reports it feels like he is not getting a full breath.  Patient denies cough or wheezing.  Patient also reports having a throbbing sensation in the middle of his chest.  This has been ongoing for the last few days but was worse today.  Patient had a robotic biopsy bronchoscopy done on July 3 and sustained a pneumothorax from that.  Subsequently patient presents to the ER for evaluation.  Patient denies any fevers.    HPI    Patient Care Team  Primary Care Provider: Maite Montoya DO    Past Medical History:     No Known Allergies  Past Medical History:   Diagnosis Date    Back pain     Cancer     skin    Hyperlipidemia     Hypertension     Insomnia     Lung nodule     PTSD (post-traumatic stress disorder)     no isssues when waking from anesthesia    [place] spotted fever     SYMPTOMS OF AND TREATED AS IF HE HAD IT JUNE 2024    Tinnitus      Past Surgical History:   Procedure Laterality Date    BACK SURGERY      x2 LUMBAR DISC REMOVED    BRONCHOSCOPY WITH ION ROBOTIC ASSIST Right 7/3/2024    Procedure: BRONCHOSCOPY WITH ION ROBOT, REBUS, EBUS, NEEDLE ASPIRATE, BAL, WASHINGS, BRUSHINGS, BIOPSIES;  Surgeon: Prieto Vogel MD;  Location: [facility] MAIN OR;  Service: Robotics - Pulmonary;  Laterality: Right;    COLONOSCOPY N/A 01/03/2023    Procedure: COLONOSCOPY WITH POLYPECTOMY;  Surgeon: Jose Hopson MD;  Location: [facility] ENDOSCOPY;  Service: General;  Laterality: N/A;  COLON POLYP, SUBOPTIMAL PREP    ENDOSCOPY N/A 01/03/2023    Procedure: ESOPHAGOGASTRODUODENOSCOPY WITH BIOPSIES, POLYPECTOMIES;  Surgeon:  Jose Hopson MD;  Location: [facility] ENDOSCOPY;  Service: General;  Laterality: N/A;  HIATAL HERNIA, GASTRIC POLYPS    KNEE SURGERY Right     SCOPE ACL MCL    OTHER SURGICAL HISTORY      CANCER REMOVAL FACE    SHOULDER ARTHROSCOPY W/ ROTATOR CUFF REPAIR Right 08/17/2023    Procedure: SHOULDER ARTHROSCOPY WITH SUBCROMIAL DECOMPRESSION AND DISTAL CLAVICULECTOMY, OPEN BICEPS TENODESIS;  Surgeon: Marek Gaviria MD;  Location: [facility] OR [facility] [place];  Service: Orthopedics;  Laterality: Right;     Family History   Problem Relation Age of Onset    Malig Hyperthermia Neg Hx        Home Medications:  Prior to Admission medications    Medication Sig Start Date End Date Taking? Authorizing Provider   albuterol sulfate  (90 Base) MCG/ACT inhaler Inhale 2 puffs Every 6 (Six) Hours As Needed for Shortness of Air or Wheezing. 5/26/24   Flor Rodriguez APRN   atorvastatin (LIPITOR) 20 MG tablet Take 1 tablet by mouth Daily.    Provider, MD Anila   lidocaine (Lidoderm) 5 % Place 1 patch on the skin as directed by provider Daily. Remove & Discard patch within 12 hours or as directed by MD 11/28/23   Maite Montoya,    pantoprazole (PROTONIX) 40 MG EC tablet TAKE 1 TABLET BY MOUTH DAILY 3/14/24   Jose Hopson MD   traZODone (DESYREL) 100 MG tablet Take 3 tablets by mouth Every Night.    Emergency, Nurse Jennifer, RN        Social History:   Social History     Tobacco Use    Smoking status: Former     Types: Cigarettes     Passive exposure: Never    Smokeless tobacco: Current     Types: Snuff    Tobacco comments:     Patient states he did smoke for a few years, unsure of time frame and PPD.      INST PER ANESTHESIA PROTOCOL, LAST CHEWED TOBACCO LAST NIGHT   Vaping Use    Vaping status: Never Used   Substance Use Topics    Alcohol use: Yes     Comment: RARELY    Drug use: Not Currently         Review of Systems:  Review of Systems   Constitutional:  Negative for chills and fever.   HENT:  Negative for congestion, ear pain and  "sore throat.    Eyes:  Negative for pain.   Respiratory:  Positive for chest tightness and shortness of breath. Negative for cough.    Cardiovascular:  Negative for chest pain.   Gastrointestinal:  Negative for abdominal pain, diarrhea, nausea and vomiting.   Genitourinary:  Negative for flank pain and hematuria.   Musculoskeletal:  Negative for joint swelling.   Skin:  Negative for pallor.   Neurological:  Negative for seizures and headaches.   All other systems reviewed and are negative.       Physical Exam:  /92   Pulse 77   Temp 98.3 °F (36.8 °C) (Oral)   Resp 18   Ht 177.8 cm (70\")   Wt 93 kg (205 lb)   SpO2 95%   BMI 29.41 kg/m²     Physical Exam    Vital signs were reviewed under triage note.  General appearance - Patient appears well-developed and well-nourished.  Patient is in no acute distress.  Head - Normocephalic, atraumatic.  Pupils - Equal, round, reactive to light.  Extraocular muscles are intact.  Conjunctiva is clear.  Nasal - Normal inspection.  No evidence of trauma or epistaxis.  Tympanic membranes - Gray, intact without erythema or retractions.  Oral mucosa - Pink and moist without lesions or erythema.  Uvula is midline.  Chest wall - Atraumatic.  Chest wall is nontender.  There are no vesicular rashes noted.  Neck - Supple.  Trachea was midline.  There is no palpable lymphadenopathy or thyromegaly.  There are no meningeal signs  Lungs - Clear to auscultation and percussion bilaterally.  Heart - Regular rate and rhythm without any murmurs, clicks, or gallops.  Abdomen - Soft.  Bowel sounds are present.  There is no palpable tenderness.  There is no rebound, guarding, or rigidity.  There are no palpable masses.  There are no pulsatile masses.  Back - Spine is straight and midline.  There is no CVA tenderness.  Extremities - Intact x4 with full range of motion.  There is no palpable edema.  Pulses are intact x4 and equal.  Neurologic - Patient is awake, alert, and oriented x3.  " Cranial nerves II through XII are grossly intact.  Motor and sensory functions grossly intact.  Cerebellar function was normal.  Integument - There are no rashes.  There are no petechia or purpura lesions noted.  There are no vesicular lesions noted.           Procedures:  Procedures      Medical Decision Making:      Comorbidities that affect care:    Hypertension, hyperlipidemia, PTSD, tinnitus, history of pulmonary embolism after shoulder surgery, recent pneumothorax after lung biopsy    External Notes reviewed:    Hospital Discharge Summary: Hospital discharge summary on 7/4/2024 was reviewed by me.      The following orders were placed and all results were independently analyzed by me:  Orders Placed This Encounter   Procedures    XR Chest 1 View    [place] Draw    High Sensitivity Troponin T    Comprehensive Metabolic Panel    Lipase    BNP    Magnesium    CBC Auto Differential    High Sensitivity Troponin T 2Hr    NPO Diet NPO Type: Strict NPO    Undress & Gown    Continuous Pulse Oximetry    Oxygen Therapy- Nasal Cannula; Titrate 1-6 LPM Per SpO2; 90 - 95%    ECG 12 Lead ED Triage Standing Order; Chest Pain    ECG 12 Lead ED Triage Standing Order; Chest Pain    Insert Peripheral IV    CBC & Differential    Green Top (Gel)    Lavender Top    Gold Top - SST    Light Blue Top       Medications Given in the Emergency Department:  Medications   sodium chloride 0.9 % flush 10 mL (has no administration in time range)   aspirin chewable tablet 324 mg (324 mg Oral Given 7/9/24 1159)   ketorolac (TORADOL) injection 30 mg (30 mg Intravenous Given 7/9/24 1314)        ED Course:    ED Course as of 07/09/24 1508   Tue Jul 09, 2024   1503 EKG performed at 1135 was interpreted by me to show a normal sinus rhythm with a ventricular of 85 bpm.  The ND interval is 147 ms.  P waves are normal.  QRS interval is normal.  Axis was leftward at -6 degrees.  There is no acute ischemic ST or T wave change identified.  QT corrected was  459 ms. [TB]   1504 EKG performed at 1412 was interpreted by me to show a normal sinus rhythm with a ventricular of 81 bpm.  The AL interval is 150 ms.  P waves are normal.  QRS interval is normal.  Axis was leftward -9 degrees.  There is no acute ischemic ST or T wave change identified.  QT corrected was 430 ms. [TB]      ED Course User Index  [TB] Yony Urena DO       The patient was seen and evaluated the ED by me.  The above history and physical examination was performed as documented.  Diagnostic data was obtained.  Results reviewed.  Findings were discussed with the patient.  There is no evidence of pneumothorax at this time.  There is no pneumonic process as well.  Cardiac workup was negative.  I did discuss the patient's PET scan findings as well as his biopsy results with the patient.  I explained that to me that is inconclusive as 1 is suggestive of possible metastatic disease and the biopsy did not show any malignant cells.  Patient has a follow-up appoint with pulmonary on Friday.  I stressed the need for him to follow-up so they can answer all appropriate questions as this is more of their specialty and they will have better understanding and knowledge of the result test.  Patient reports his symptoms have resolved after the Toradol dose.  Patient be discharged home with p.o. Toradol.  Patient is agreeable to this treatment plan.    Labs:    Lab Results (last 24 hours)       Procedure Component Value Units Date/Time    High Sensitivity Troponin T [[id number]]  (Normal) Collected: 07/09/24 1146    Specimen: Blood Updated: 07/09/24 1210     HS Troponin T <6 ng/L     Narrative:      High Sensitive Troponin T Reference Range:  <14.0 ng/L- Negative Female for AMI  <22.0 ng/L- Negative Male for AMI  >=14 - Abnormal Female indicating possible myocardial injury.  >=22 - Abnormal Male indicating possible myocardial injury.   Clinicians would have to utilize clinical acumen, EKG, Troponin, and serial changes to  determine if it is an Acute Myocardial Infarction or myocardial injury due to an underlying chronic condition.         CBC & Differential [[id number]]  (Abnormal) Collected: 07/09/24 1146    Specimen: Blood Updated: 07/09/24 1152    Narrative:      The following orders were created for panel order CBC & Differential.  Procedure                               Abnormality         Status                     ---------                               -----------         ------                     CBC Auto Differential[[id number]]        Abnormal            Final result                 Please view results for these tests on the individual orders.    Comprehensive Metabolic Panel [[id number]]  (Abnormal) Collected: 07/09/24 1146    Specimen: Blood Updated: 07/09/24 1210     Glucose 159 mg/dL      BUN 11 mg/dL      Creatinine 1.05 mg/dL      Sodium 135 mmol/L      Potassium 3.8 mmol/L      Chloride 101 mmol/L      CO2 23.8 mmol/L      Calcium 8.4 mg/dL      Total Protein 6.2 g/dL      Albumin 3.6 g/dL      ALT (SGPT) 57 U/L      AST (SGOT) 35 U/L      Alkaline Phosphatase 130 U/L      Total Bilirubin 0.5 mg/dL      Globulin 2.6 gm/dL      A/G Ratio 1.4 g/dL      BUN/Creatinine Ratio 10.5     Anion Gap 10.2 mmol/L      eGFR 81.8 mL/min/1.73     Narrative:      GFR Normal >60  Chronic Kidney Disease <60  Kidney Failure <15      Lipase [[id number]]  (Normal) Collected: 07/09/24 1146    Specimen: Blood Updated: 07/09/24 1210     Lipase 36 U/L     BNP [[id number]]  (Normal) Collected: 07/09/24 1146    Specimen: Blood Updated: 07/09/24 1208     proBNP <36.0 pg/mL     Narrative:      This assay is used as an aid in the diagnosis of individuals suspected of having heart failure. It can be used as an aid in the diagnosis of acute decompensated heart failure (ADHF) in patients presenting with signs and symptoms of ADHF to the emergency department (ED). In addition, NT-proBNP of <300 pg/mL indicates ADHF is not likely.    Age Range Result  Interpretation  NT-proBNP Concentration (pg/mL:      <50             Positive            >450                   Gray                 300-450                    Negative             <300    50-75           Positive            >900                  Gray                300-900                  Negative            <300      >75             Positive            >1800                  Gray                300-1800                  Negative            <300    Magnesium [[id number]]  (Normal) Collected: 07/09/24 1146    Specimen: Blood Updated: 07/09/24 1210     Magnesium 1.9 mg/dL     CBC Auto Differential [[id number]]  (Abnormal) Collected: 07/09/24 1146    Specimen: Blood Updated: 07/09/24 1152     WBC 3.64 10*3/mm3      RBC 4.66 10*6/mm3      Hemoglobin 13.6 g/dL      Hematocrit 40.1 %      MCV 86.1 fL      MCH 29.2 pg      MCHC 33.9 g/dL      RDW 13.1 %      RDW-SD 40.2 fl      MPV 8.8 fL      Platelets 152 10*3/mm3      Neutrophil % 70.4 %      Lymphocyte % 14.3 %      Monocyte % 9.6 %      Eosinophil % 3.8 %      Basophil % 0.8 %      Immature Grans % 1.1 %      Neutrophils, Absolute 2.56 10*3/mm3      Lymphocytes, Absolute 0.52 10*3/mm3      Monocytes, Absolute 0.35 10*3/mm3      Eosinophils, Absolute 0.14 10*3/mm3      Basophils, Absolute 0.03 10*3/mm3      Immature Grans, Absolute 0.04 10*3/mm3      nRBC 0.0 /100 WBC     High Sensitivity Troponin T 2Hr [[id number]] Collected: 07/09/24 1357    Specimen: Blood Updated: 07/09/24 1419     HS Troponin T <6 ng/L      Troponin T Delta --     Comment: Unable to calculate.       Narrative:      High Sensitive Troponin T Reference Range:  <14.0 ng/L- Negative Female for AMI  <22.0 ng/L- Negative Male for AMI  >=14 - Abnormal Female indicating possible myocardial injury.  >=22 - Abnormal Male indicating possible myocardial injury.   Clinicians would have to utilize clinical acumen, EKG, Troponin, and serial changes to determine if it is an Acute Myocardial Infarction or  myocardial injury due to an underlying chronic condition.                  Imaging:    XR Chest 1 View    Result Date: 7/9/2024  XR CHEST 1 VW Date of Exam: 7/9/2024 11:43 AM EDT Indication: Chest Pain Triage Protocol Comparison 7/4/2024 Findings: Right pigtail catheter has been removed. There is no pneumothorax. The right lung is clear. The heart size is normal. There is unchanged mild scar or atelectasis of the left lung base. There are no effusions.     Impression: No pneumothorax identified after right chest tube removal. Stable mild scar or atelectasis left lung base. Electronically Signed: Merle Magdaleno MD  7/9/2024 12:05 PM EDT  Workstation ID: [id number]       Differential Diagnosis and Discussion:    Dyspnea: Differential diagnosis includes but is not limited to metabolic acidosis, neurological disorders, psychogenic, asthma, pneumothorax, upper airway obstruction, COPD, pneumonia, noncardiogenic pulmonary edema, interstitial lung disease, anemia, congestive heart failure, and pulmonary embolism    All labs were reviewed and interpreted by me.  All X-rays impressions were independently interpreted by me.  EKG was interpreted by me.    MDM     Amount and/or Complexity of Data Reviewed  Clinical lab tests: reviewed  Tests in the radiology section of CPT®: reviewed  Tests in the medicine section of CPT®: reviewed             Patient Care Considerations:    CT CHEST: I considered ordering a CT scan of the chest, however the patient's vital signs were stable.  The patient was not tachycardic or tachypneic.  Patient was also not hypoxic.      Consultants/Shared Management Plan:    None    Social Determinants of Health:    Patient is independent, reliable, and has access to care.       Disposition and Care Coordination:    Discharged: The patient is suitable and stable for discharge with no need for consideration of admission.    I have explained the patient´s condition, diagnoses and treatment plan based on the  information available to me at this time. I have answered questions and addressed any concerns. The patient has a good  understanding of the patient´s diagnosis, condition, and treatment plan as can be expected at this point. The vital signs have been stable. The patient´s condition is stable and appropriate for discharge from the emergency department.      The patient will pursue further outpatient evaluation with the primary care physician or other designated or consulting physician as outlined in the discharge instructions. They are agreeable to this plan of care and follow-up instructions have been explained in detail. The patient has received these instructions in written format and has expressed an understanding of the discharge instructions. The patient is aware that any significant change in condition or worsening of symptoms should prompt an immediate return to this or the closest emergency department or call to 911.    Final diagnoses:   Non-cardiac chest pain        ED Disposition       ED Disposition   Discharge    Condition   Stable    Comment   --               This medical record created using voice recognition software.             Yony Urena DO  07/11/24 2100

## 2024-07-10 LAB
FUNGUS WND CULT: NORMAL
FUNGUS WND CULT: NORMAL
MYCOBACTERIUM SPEC CULT: NORMAL
MYCOBACTERIUM SPEC CULT: NORMAL
NIGHT BLUE STAIN TISS: NORMAL

## 2024-07-11 NOTE — PROGRESS NOTES
Primary Care Provider  Maite Montoya DO     Referring Provider  No ref. provider found     Chief Complaint  Follow-up (1 week f/up - post ION ), Lung Nodule, and abnormal CT    Subjective          Pavan Perales presents to [facility] PULMONARY & CRITICAL CARE MEDICINE  History of Present Illness  Pavan Perales is a 59 y.o. male patient here for management of a lung nodule, abnormal PET and tobacco abuse of cigarettes in remission.     Recently biopsied by Dr. Vogel on 7/3/2024.  All cultures are negative to date.  Cytology is negative for any malignant cells.  Pathology does show nonnecrotizing granulomatous inflammation.  I spoke with Dr. Tejeda and it appears that this could potentially be histoplasmosis.  We will order fungal serologies along with TB QuantiFERON.  We will also repeat patient's chest CT in 3 months.  I discussed this plan with patient and wife today in office.  Patient states that he does have chickens and is in and out of the keep feeding them.  Of note, patient does also have a history of latent TB.  He does complain of overall not feeling well.  I have informed them that once the blood work results, if it does show a fungus we will get him started on antifungal medication.  He will need blood work 1 month after starting treatment.  Overall, they have no additional concerns at this time.  He is able to perform his ADLs without difficulty.  He is up-to-date with his COVID and flu vaccine.  He does decline a pneumonia vaccine.     His history of smoking is   Tobacco Use: High Risk (7/12/2024)    Patient History     Smoking Tobacco Use: Former     Smokeless Tobacco Use: Current     Passive Exposure: Never   .    Review of Systems   Constitutional:  Negative for chills, fatigue, fever, unexpected weight gain and unexpected weight loss.   HENT:  Congestion: Nasal.    Respiratory:  Positive for shortness of breath. Negative for apnea, cough and wheezing.         Negative  for Hemoptysis     Cardiovascular:  Negative for chest pain, palpitations and leg swelling.   Skin:         Negative for cyanosis      Sleep: Negative for Excessive daytime sleepiness  Negative for morning headaches  Negative for Snoring    Family History   Problem Relation Age of Onset    Malig Hyperthermia Neg Hx         Social History     Socioeconomic History    Marital status:    Tobacco Use    Smoking status: Former     Types: Cigarettes     Passive exposure: Never    Smokeless tobacco: Current     Types: Snuff    Tobacco comments:     Patient states he did smoke for a few years, unsure of time frame and PPD.      INST PER ANESTHESIA PROTOCOL, LAST CHEWED TOBACCO LAST NIGHT   Vaping Use    Vaping status: Never Used   Substance and Sexual Activity    Alcohol use: Yes     Comment: RARELY    Drug use: Not Currently    Sexual activity: Defer        Past Medical History:   Diagnosis Date    Back pain     Cancer     skin    Hyperlipidemia     Hypertension     Insomnia     Lung nodule     PTSD (post-traumatic stress disorder)     no isssues when waking from anesthesia    Mumtaz Mountain spotted fever     SYMPTOMS OF AND TREATED AS IF HE HAD IT JUNE 2024    Tinnitus         Immunization History   Administered Date(s) Administered    COVID-19 (MODERNA) 1st,2nd,3rd Dose Monovalent 01/07/2021, 02/04/2021    Fluzone (or Fluarix & Flulaval for VFC) >6mos 10/23/2020, 12/20/2023    Influenza Injectable Mdck Pf Quad 11/30/2022    Shingrix 11/30/2022, 07/18/2023         No Known Allergies       Current Outpatient Medications:     albuterol sulfate  (90 Base) MCG/ACT inhaler, Inhale 2 puffs Every 6 (Six) Hours As Needed for Shortness of Air or Wheezing., Disp: 18 g, Rfl: 0    atorvastatin (LIPITOR) 20 MG tablet, Take 1 tablet by mouth Daily., Disp: , Rfl:     ketorolac (TORADOL) 10 MG tablet, Take 1 tablet by mouth Every 6 (Six) Hours As Needed for Mild Pain., Disp: 20 tablet, Rfl: 0    lidocaine (Lidoderm) 5 %,  "Place 1 patch on the skin as directed by provider Daily. Remove & Discard patch within 12 hours or as directed by MD, Disp: 15 each, Rfl: 1    pantoprazole (PROTONIX) 40 MG EC tablet, TAKE 1 TABLET BY MOUTH DAILY, Disp: 30 tablet, Rfl: 10    traZODone (DESYREL) 100 MG tablet, Take 3 tablets by mouth Every Night., Disp: , Rfl:      Objective   Physical Exam  Constitutional:       General: He is not in acute distress.     Appearance: Normal appearance. He is normal weight.   HENT:      Right Ear: Hearing normal.      Left Ear: Hearing normal.      Nose: No nasal tenderness or congestion.      Mouth/Throat:      Mouth: Mucous membranes are moist. No oral lesions.   Eyes:      Extraocular Movements: Extraocular movements intact.      Pupils: Pupils are equal, round, and reactive to light.   Cardiovascular:      Rate and Rhythm: Normal rate and regular rhythm.      Pulses: Normal pulses.      Heart sounds: Normal heart sounds. No murmur heard.  Pulmonary:      Effort: Pulmonary effort is normal.      Breath sounds: Normal breath sounds. No wheezing, rhonchi or rales.   Musculoskeletal:      Right lower leg: No edema.      Left lower leg: No edema.   Skin:     General: Skin is warm and dry.      Findings: No lesion or rash.   Neurological:      General: No focal deficit present.      Mental Status: He is alert and oriented to person, place, and time.   Psychiatric:         Mood and Affect: Affect normal. Mood is not anxious or depressed.         Vital Signs:   /80 (BP Location: Left arm, Patient Position: Sitting, Cuff Size: Large Adult)   Pulse 73   Temp 97.6 °F (36.4 °C) (Oral)   Resp 16   Ht 177.8 cm (70\")   Wt 93 kg (205 lb)   SpO2 95% Comment: RA  BMI 29.41 kg/m²        Result Review :   The following data was reviewed by: DEMARCUS Casey on 07/12/2024:  CMP          6/24/2024    09:40 7/4/2024    04:24 7/9/2024    11:46   CMP   Glucose 94  197  159    BUN 7  11  11    Creatinine 1.10  0.92  1.05  "   EGFR 77.3  95.8  81.8    Sodium 138  133  135    Potassium 4.4  3.9  3.8    Chloride 104  101  101    Calcium 8.6  8.4  8.4    Total Protein 6.0  5.9  6.2    Albumin 3.8  3.5  3.6    Globulin 2.2  2.4  2.6    Total Bilirubin 0.4  0.4  0.5    Alkaline Phosphatase 111  124  130    AST (SGOT) 63  38  35    ALT (SGPT) 92  69  57    Albumin/Globulin Ratio 1.7  1.5  1.4    BUN/Creatinine Ratio 6.4  12.0  10.5    Anion Gap 9.4  10.7  10.2      CBC w/diff          6/24/2024    09:40 7/4/2024    04:24 7/9/2024    11:46   CBC w/Diff   WBC 2.95  5.38  3.64    RBC 4.62  4.32  4.66    Hemoglobin 13.5  12.4  13.6    Hematocrit 40.6  38.1  40.1    MCV 87.9  88.2  86.1    MCH 29.2  28.7  29.2    MCHC 33.3  32.5  33.9    RDW 12.8  12.7  13.1    Platelets 139  133  152    Neutrophil Rel %  91.9  70.4    Immature Granulocyte Rel %  0.4  1.1    Lymphocyte Rel %  5.8  14.3    Monocyte Rel %  1.7  9.6    Eosinophil Rel %  0.0  3.8    Basophil Rel %  0.2  0.8    Personally reviewed AFB culture, respiratory culture, fungus culture, bronchoscopy wash, pneumonia panel, AFB stain, Gram stain, tissue pathology and cytology from 7/3/2024    Data reviewed : Radiologic studies chest CT 6/24/2024, PET scan 7/8/2024, chest x-ray 7/9/2024, Recent hospitalization notes Dr. Vogel bronchoscopy operative note 7/3/2024, emergency room physician note 7/9/2024, and my last office note    Procedures        Assessment and Plan    Diagnoses and all orders for this visit:    1. Abnormal CT scan of lung (Primary)  -     Fungal Antibodies, Quantitative Double Immunodiffusion; Future  -     Fungitell B-D Glucan; Future  -     Histoplasma Ag Ur - Urine, Urine, Clean Catch; Future  -     Histoplasma Antibodies; Future  -     QuantiFERON TB Gold; Future  -     CT Chest Without Contrast; Future    2. Abnormal PET of right lung  -     Fungal Antibodies, Quantitative Double Immunodiffusion; Future  -     Fungitell B-D Glucan; Future  -     Histoplasma Ag Ur - Urine,  Urine, Clean Catch; Future  -     Histoplasma Antibodies; Future  -     QuantiFERON TB Gold; Future    3. Lung nodule  -     CT Chest Without Contrast; Future        Follow Up   Return in about 3 months (around 10/12/2024) for Recheck CT towards end of October.  Patient was given instructions and counseling regarding his condition or for health maintenance advice. Please see specific information pulled into the AVS if appropriate.

## 2024-07-12 ENCOUNTER — LAB (OUTPATIENT)
Dept: LAB | Facility: HOSPITAL | Age: 59
End: 2024-07-12
Payer: OTHER GOVERNMENT

## 2024-07-12 ENCOUNTER — OFFICE VISIT (OUTPATIENT)
Dept: PULMONOLOGY | Facility: CLINIC | Age: 59
End: 2024-07-12
Payer: OTHER GOVERNMENT

## 2024-07-12 VITALS
SYSTOLIC BLOOD PRESSURE: 130 MMHG | OXYGEN SATURATION: 95 % | BODY MASS INDEX: 29.35 KG/M2 | DIASTOLIC BLOOD PRESSURE: 80 MMHG | TEMPERATURE: 97.6 F | HEIGHT: 70 IN | RESPIRATION RATE: 16 BRPM | HEART RATE: 73 BPM | WEIGHT: 205 LBS

## 2024-07-12 DIAGNOSIS — R91.1 LUNG NODULE: ICD-10-CM

## 2024-07-12 DIAGNOSIS — R50.9 FEBRILE ILLNESS: ICD-10-CM

## 2024-07-12 DIAGNOSIS — R91.8 ABNORMAL CT SCAN OF LUNG: Primary | ICD-10-CM

## 2024-07-12 DIAGNOSIS — R91.8 ABNORMAL CT SCAN OF LUNG: ICD-10-CM

## 2024-07-12 DIAGNOSIS — R94.2 ABNORMAL PET OF RIGHT LUNG: ICD-10-CM

## 2024-07-12 LAB
ALBUMIN SERPL-MCNC: 4 G/DL (ref 3.5–5.2)
ALBUMIN/GLOB SERPL: 1.5 G/DL
ALP SERPL-CCNC: 135 U/L (ref 39–117)
ALT SERPL W P-5'-P-CCNC: 67 U/L (ref 1–41)
ANION GAP SERPL CALCULATED.3IONS-SCNC: 10.6 MMOL/L (ref 5–15)
AST SERPL-CCNC: 44 U/L (ref 1–40)
BILIRUB SERPL-MCNC: 0.4 MG/DL (ref 0–1.2)
BUN SERPL-MCNC: 10 MG/DL (ref 6–20)
BUN/CREAT SERPL: 8.5 (ref 7–25)
CALCIUM SPEC-SCNC: 9.2 MG/DL (ref 8.6–10.5)
CHLORIDE SERPL-SCNC: 104 MMOL/L (ref 98–107)
CO2 SERPL-SCNC: 23.4 MMOL/L (ref 22–29)
CREAT SERPL-MCNC: 1.18 MG/DL (ref 0.76–1.27)
DEPRECATED RDW RBC AUTO: 38.4 FL (ref 37–54)
EGFRCR SERPLBLD CKD-EPI 2021: 71.1 ML/MIN/1.73
ERYTHROCYTE [DISTWIDTH] IN BLOOD BY AUTOMATED COUNT: 12.4 % (ref 12.3–15.4)
GLOBULIN UR ELPH-MCNC: 2.6 GM/DL
GLUCOSE SERPL-MCNC: 97 MG/DL (ref 65–99)
HCT VFR BLD AUTO: 41.7 % (ref 37.5–51)
HGB BLD-MCNC: 13.9 G/DL (ref 13–17.7)
MCH RBC QN AUTO: 29 PG (ref 26.6–33)
MCHC RBC AUTO-ENTMCNC: 33.3 G/DL (ref 31.5–35.7)
MCV RBC AUTO: 86.9 FL (ref 79–97)
PLATELET # BLD AUTO: 108 10*3/MM3 (ref 140–450)
PMV BLD AUTO: 9.7 FL (ref 6–12)
POTASSIUM SERPL-SCNC: 4.1 MMOL/L (ref 3.5–5.2)
PROT SERPL-MCNC: 6.6 G/DL (ref 6–8.5)
RBC # BLD AUTO: 4.8 10*6/MM3 (ref 4.14–5.8)
SODIUM SERPL-SCNC: 138 MMOL/L (ref 136–145)
WBC NRBC COR # BLD AUTO: 3.75 10*3/MM3 (ref 3.4–10.8)

## 2024-07-12 PROCEDURE — 80053 COMPREHEN METABOLIC PANEL: CPT

## 2024-07-12 PROCEDURE — 86606 ASPERGILLUS ANTIBODY: CPT

## 2024-07-12 PROCEDURE — 86612 BLASTOMYCES ANTIBODY: CPT

## 2024-07-12 PROCEDURE — 86698 HISTOPLASMA ANTIBODY: CPT

## 2024-07-12 PROCEDURE — 85027 COMPLETE CBC AUTOMATED: CPT

## 2024-07-12 PROCEDURE — 87385 HISTOPLASMA CAPSUL AG IA: CPT

## 2024-07-12 PROCEDURE — 36415 COLL VENOUS BLD VENIPUNCTURE: CPT

## 2024-07-12 PROCEDURE — 99214 OFFICE O/P EST MOD 30 MIN: CPT | Performed by: NURSE PRACTITIONER

## 2024-07-12 PROCEDURE — 86480 TB TEST CELL IMMUN MEASURE: CPT

## 2024-07-12 PROCEDURE — 87449 NOS EACH ORGANISM AG IA: CPT

## 2024-07-14 LAB — H CAPSUL AG UR QL IA: NEGATIVE

## 2024-07-16 LAB
A FLAVUS AB SER QL ID: NEGATIVE
A FUMIGATUS AB SER QL ID: NEGATIVE
A NIGER AB SER QL ID: NEGATIVE
B DERMAT AB TITR SER: NEGATIVE {TITER}
GAMMA INTERFERON BACKGROUND BLD IA-ACNC: 0.62 IU/ML
M TB IFN-G BLD-IMP: NEGATIVE
M TB IFN-G CD4+ BCKGRND COR BLD-ACNC: 0.65 IU/ML
M TB IFN-G CD4+CD8+ BCKGRND COR BLD-ACNC: 0.61 IU/ML
MITOGEN IGNF BCKGRD COR BLD-ACNC: >10 IU/ML
QUANTIFERON INCUBATION: NORMAL
SERVICE CMNT-IMP: NORMAL

## 2024-07-17 ENCOUNTER — TELEPHONE (OUTPATIENT)
Dept: PULMONOLOGY | Facility: CLINIC | Age: 59
End: 2024-07-17

## 2024-07-17 ENCOUNTER — READMISSION MANAGEMENT (OUTPATIENT)
Dept: CALL CENTER | Facility: HOSPITAL | Age: 59
End: 2024-07-17
Payer: OTHER GOVERNMENT

## 2024-07-17 DIAGNOSIS — B39.2 PULMONARY HISTOPLASMOSIS: Primary | ICD-10-CM

## 2024-07-17 DIAGNOSIS — B39.0 ACUTE PULMONARY HISTOPLASMOSIS: ICD-10-CM

## 2024-07-17 DIAGNOSIS — Z79.899 MEDICATION MANAGEMENT: ICD-10-CM

## 2024-07-17 LAB
DPYD GENE MUT ANL BLD/T: POSITIVE
FUNGITELL VALUE: 92.84 PG/ML
FUNGUS WND CULT: NORMAL
FUNGUS WND CULT: NORMAL
IMP & REVIEW OF LAB RESULTS: ABNORMAL
Lab: ABNORMAL
Lab: ABNORMAL
MYCOBACTERIUM SPEC CULT: NORMAL
MYCOBACTERIUM SPEC CULT: NORMAL
NIGHT BLUE STAIN TISS: NORMAL
PATHOLOGIST NAME: ABNORMAL
REFERENCE VALUE: ABNORMAL

## 2024-07-17 RX ORDER — ITRACONAZOLE 100 MG/1
CAPSULE ORAL
Qty: 126 CAPSULE | Refills: 0 | Status: SHIPPED | OUTPATIENT
Start: 2024-07-17 | End: 2024-08-16

## 2024-07-17 NOTE — TELEPHONE ENCOUNTER
Patient called wanting to know the results of their labs done on 07/12/24. They claimed that they were given a diagnosis that they saw on their Mychart that their labs say negative for that. Therefore they would like to be called on their results. Please Advise. Thank you.

## 2024-07-17 NOTE — OUTREACH NOTE
Medical Week 2 Survey      Flowsheet Row Responses   [facility] patient discharged from? [facility]   Does the patient have one of the following disease processes/diagnoses(primary or secondary)? Other   Week 2 attempt successful? Yes   Call start time 1352   Discharge diagnosis Lung nodule-Bronchoscopy this visit   Is patient permission given to speak with other caregiver? Yes   Person spoke with today (if not patient) and relationship Wife-Janelle.   Meds reviewed with patient/caregiver? Yes   Is the patient having any side effects they believe may be caused by any medication additions or changes? No   Does the patient have all medications ordered at discharge? Yes   Is the patient taking all medications as directed (includes completed medication regime)? Yes   Does the patient have a primary care provider?  Yes   Does the patient have an appointment with their PCP within 7 days of discharge? Yes   Comments regarding PCP PCP follow up 7/19/24   Has the patient kept scheduled appointments due by today? Yes   Has home health visited the patient within 72 hours of discharge? N/A   Psychosocial issues? No   Did the patient receive a copy of their discharge instructions? Yes   Nursing interventions Reviewed instructions with patient   What is the patient's perception of their health status since discharge? Improving   Week 2 Call Completed? Yes   Is the patient interested in additional calls from an ambulatory ? No   Would this patient benefit from a Referral to Amb Social Work? No   Wrap up additional comments Spouse reports patient doing well.            Caitlyn SILVA - Registered Nurse

## 2024-07-18 LAB — H CAPSUL AB TITR SER ID: POSITIVE {TITER}

## 2024-08-07 LAB
MYCOBACTERIUM SPEC CULT: NORMAL
MYCOBACTERIUM SPEC CULT: NORMAL
NIGHT BLUE STAIN TISS: NORMAL

## 2024-08-08 ENCOUNTER — HOSPITAL ENCOUNTER (OUTPATIENT)
Dept: NUCLEAR MEDICINE | Facility: HOSPITAL | Age: 59
Discharge: HOME | End: 2024-08-08
Payer: OTHER GOVERNMENT

## 2024-08-08 DIAGNOSIS — R07.9 CHEST PAIN, UNSPECIFIED TYPE: ICD-10-CM

## 2024-08-08 LAB
BH CV IMMEDIATE POST RECOVERY TECH DATA SYMPTOMS: NORMAL
BH CV IMMEDIATE POST TECH DATA BLOOD PRESSURE: NORMAL MMHG
BH CV IMMEDIATE POST TECH DATA HEART RATE: 103 BPM
BH CV IMMEDIATE POST TECH DATA OXYGEN SATS: 96 %
BH CV REST NUCLEAR ISOTOPE DOSE: 10.1 MCI
BH CV SIX MINUTE RECOVERY TECH DATA BLOOD PRESSURE: NORMAL
BH CV SIX MINUTE RECOVERY TECH DATA HEART RATE: 77 BPM
BH CV SIX MINUTE RECOVERY TECH DATA OXYGEN SATURATION: 95 %
BH CV STRESS BP STAGE 1: NORMAL
BH CV STRESS BP STAGE 2: NORMAL
BH CV STRESS BP STAGE 3: NORMAL
BH CV STRESS DURATION MIN STAGE 1: 3
BH CV STRESS DURATION MIN STAGE 2: 3
BH CV STRESS DURATION MIN STAGE 3: 3
BH CV STRESS DURATION SEC STAGE 1: 0
BH CV STRESS DURATION SEC STAGE 2: 0
BH CV STRESS DURATION SEC STAGE 3: 0
BH CV STRESS GRADE STAGE 1: 10
BH CV STRESS GRADE STAGE 2: 12
BH CV STRESS GRADE STAGE 3: 14
BH CV STRESS HR STAGE 1: 99
BH CV STRESS HR STAGE 2: 120
BH CV STRESS HR STAGE 3: 141
BH CV STRESS METS STAGE 1: 5
BH CV STRESS METS STAGE 2: 7.5
BH CV STRESS METS STAGE 3: 10
BH CV STRESS NUCLEAR ISOTOPE DOSE: 37 MCI
BH CV STRESS O2 STAGE 1: 95
BH CV STRESS O2 STAGE 2: 94
BH CV STRESS O2 STAGE 3: 97
BH CV STRESS PROTOCOL 1: NORMAL
BH CV STRESS RECOVERY BP: NORMAL MMHG
BH CV STRESS RECOVERY HR: 77 BPM
BH CV STRESS RECOVERY O2: 95 %
BH CV STRESS SPEED STAGE 1: 1.7
BH CV STRESS SPEED STAGE 2: 2.5
BH CV STRESS SPEED STAGE 3: 3.4
BH CV STRESS STAGE 1: 1
BH CV STRESS STAGE 2: 2
BH CV STRESS STAGE 3: 3
BH CV THREE MINUTE POST TECH DATA BLOOD PRESSURE: NORMAL MMHG
BH CV THREE MINUTE POST TECH DATA HEART RATE: 82 BPM
BH CV THREE MINUTE POST TECH DATA OXYGEN SATURATION: 96 %
LV EF NUC BP: 67 %
MAXIMAL PREDICTED HEART RATE: 161 BPM
PERCENT MAX PREDICTED HR: 87.58 %
STRESS BASELINE BP: NORMAL MMHG
STRESS BASELINE HR: 55 BPM
STRESS O2 SAT REST: 94 %
STRESS PERCENT HR: 103 %
STRESS POST ESTIMATED WORKLOAD: 10.2 METS
STRESS POST EXERCISE DUR MIN: 8 MIN
STRESS POST EXERCISE DUR SEC: 30 SEC
STRESS POST O2 SAT PEAK: 97 %
STRESS POST PEAK BP: NORMAL MMHG
STRESS POST PEAK HR: 141 BPM
STRESS TARGET HR: 137 BPM

## 2024-08-08 PROCEDURE — A9502 TC99M TETROFOSMIN: HCPCS | Performed by: FAMILY MEDICINE

## 2024-08-08 PROCEDURE — 93017 CV STRESS TEST TRACING ONLY: CPT

## 2024-08-08 PROCEDURE — 0 TECHNETIUM TETROFOSMIN KIT: Performed by: FAMILY MEDICINE

## 2024-08-08 PROCEDURE — 78452 HT MUSCLE IMAGE SPECT MULT: CPT

## 2024-08-08 RX ADMIN — TETROFOSMIN 1 DOSE: 1.38 INJECTION, POWDER, LYOPHILIZED, FOR SOLUTION INTRAVENOUS at 07:51

## 2024-08-08 RX ADMIN — TETROFOSMIN 1 DOSE: 1.38 INJECTION, POWDER, LYOPHILIZED, FOR SOLUTION INTRAVENOUS at 09:18

## 2024-08-12 ENCOUNTER — TELEPHONE (OUTPATIENT)
Dept: CARDIOLOGY | Facility: CLINIC | Age: 59
End: 2024-08-12
Payer: OTHER GOVERNMENT

## 2024-08-12 NOTE — TELEPHONE ENCOUNTER
----- Message from Tati Rojas sent at 8/11/2024 10:59 PM EDT -----  Stress test results did not show any clear evidence of blocked flow to the heart wall.  If he is still having concerning symptoms, we can schedule follow-up in office to reevaluate.

## 2024-08-14 ENCOUNTER — LAB (OUTPATIENT)
Dept: LAB | Facility: HOSPITAL | Age: 59
End: 2024-08-14
Payer: OTHER GOVERNMENT

## 2024-08-14 ENCOUNTER — OFFICE VISIT (OUTPATIENT)
Dept: PULMONOLOGY | Facility: CLINIC | Age: 59
End: 2024-08-14
Payer: OTHER GOVERNMENT

## 2024-08-14 VITALS
TEMPERATURE: 97.6 F | OXYGEN SATURATION: 95 % | SYSTOLIC BLOOD PRESSURE: 130 MMHG | RESPIRATION RATE: 16 BRPM | HEIGHT: 70 IN | HEART RATE: 68 BPM | BODY MASS INDEX: 29.35 KG/M2 | DIASTOLIC BLOOD PRESSURE: 68 MMHG | WEIGHT: 205 LBS

## 2024-08-14 DIAGNOSIS — B39.0 ACUTE PULMONARY HISTOPLASMOSIS: ICD-10-CM

## 2024-08-14 DIAGNOSIS — Z79.899 MEDICATION MANAGEMENT: ICD-10-CM

## 2024-08-14 DIAGNOSIS — B39.2 PULMONARY HISTOPLASMOSIS: Primary | ICD-10-CM

## 2024-08-14 DIAGNOSIS — R91.1 LUNG NODULE: ICD-10-CM

## 2024-08-14 DIAGNOSIS — B39.2 PULMONARY HISTOPLASMOSIS: ICD-10-CM

## 2024-08-14 LAB
ALBUMIN SERPL-MCNC: 4.1 G/DL (ref 3.5–5.2)
ALBUMIN/GLOB SERPL: 1.9 G/DL
ALP SERPL-CCNC: 144 U/L (ref 39–117)
ALT SERPL W P-5'-P-CCNC: 41 U/L (ref 1–41)
ANION GAP SERPL CALCULATED.3IONS-SCNC: 7 MMOL/L (ref 5–15)
AST SERPL-CCNC: 31 U/L (ref 1–40)
BASOPHILS # BLD AUTO: 0.03 10*3/MM3 (ref 0–0.2)
BASOPHILS NFR BLD AUTO: 0.7 % (ref 0–1.5)
BILIRUB SERPL-MCNC: 0.4 MG/DL (ref 0–1.2)
BUN SERPL-MCNC: 11 MG/DL (ref 6–20)
BUN/CREAT SERPL: 10.2 (ref 7–25)
CALCIUM SPEC-SCNC: 8.9 MG/DL (ref 8.6–10.5)
CHLORIDE SERPL-SCNC: 105 MMOL/L (ref 98–107)
CO2 SERPL-SCNC: 27 MMOL/L (ref 22–29)
CREAT SERPL-MCNC: 1.08 MG/DL (ref 0.76–1.27)
DEPRECATED RDW RBC AUTO: 42.6 FL (ref 37–54)
EGFRCR SERPLBLD CKD-EPI 2021: 79.1 ML/MIN/1.73
EOSINOPHIL # BLD AUTO: 0.14 10*3/MM3 (ref 0–0.4)
EOSINOPHIL NFR BLD AUTO: 3.4 % (ref 0.3–6.2)
ERYTHROCYTE [DISTWIDTH] IN BLOOD BY AUTOMATED COUNT: 13.3 % (ref 12.3–15.4)
GLOBULIN UR ELPH-MCNC: 2.2 GM/DL
GLUCOSE SERPL-MCNC: 135 MG/DL (ref 65–99)
HCT VFR BLD AUTO: 39.8 % (ref 37.5–51)
HGB BLD-MCNC: 13.2 G/DL (ref 13–17.7)
IMM GRANULOCYTES # BLD AUTO: 0.02 10*3/MM3 (ref 0–0.05)
IMM GRANULOCYTES NFR BLD AUTO: 0.5 % (ref 0–0.5)
LYMPHOCYTES # BLD AUTO: 0.63 10*3/MM3 (ref 0.7–3.1)
LYMPHOCYTES NFR BLD AUTO: 15.1 % (ref 19.6–45.3)
MCH RBC QN AUTO: 29.1 PG (ref 26.6–33)
MCHC RBC AUTO-ENTMCNC: 33.2 G/DL (ref 31.5–35.7)
MCV RBC AUTO: 87.9 FL (ref 79–97)
MONOCYTES # BLD AUTO: 0.31 10*3/MM3 (ref 0.1–0.9)
MONOCYTES NFR BLD AUTO: 7.5 % (ref 5–12)
MYCOBACTERIUM SPEC CULT: NORMAL
MYCOBACTERIUM SPEC CULT: NORMAL
NEUTROPHILS NFR BLD AUTO: 3.03 10*3/MM3 (ref 1.7–7)
NEUTROPHILS NFR BLD AUTO: 72.8 % (ref 42.7–76)
NIGHT BLUE STAIN TISS: NORMAL
NRBC BLD AUTO-RTO: 0 /100 WBC (ref 0–0.2)
PLATELET # BLD AUTO: 122 10*3/MM3 (ref 140–450)
PMV BLD AUTO: 10.1 FL (ref 6–12)
POTASSIUM SERPL-SCNC: 3.8 MMOL/L (ref 3.5–5.2)
PROT SERPL-MCNC: 6.3 G/DL (ref 6–8.5)
RBC # BLD AUTO: 4.53 10*6/MM3 (ref 4.14–5.8)
SODIUM SERPL-SCNC: 139 MMOL/L (ref 136–145)
WBC NRBC COR # BLD AUTO: 4.16 10*3/MM3 (ref 3.4–10.8)

## 2024-08-14 PROCEDURE — 85025 COMPLETE CBC W/AUTO DIFF WBC: CPT

## 2024-08-14 PROCEDURE — 36415 COLL VENOUS BLD VENIPUNCTURE: CPT

## 2024-08-14 PROCEDURE — 80053 COMPREHEN METABOLIC PANEL: CPT

## 2024-08-14 PROCEDURE — 80189 DRUG ASSAY ITRACONAZOLE: CPT

## 2024-08-14 PROCEDURE — 99214 OFFICE O/P EST MOD 30 MIN: CPT | Performed by: NURSE PRACTITIONER

## 2024-08-14 RX ORDER — ITRACONAZOLE 100 MG/1
200 CAPSULE ORAL 2 TIMES DAILY
Qty: 120 CAPSULE | Refills: 1 | Status: SHIPPED | OUTPATIENT
Start: 2024-08-14

## 2024-08-14 NOTE — PROGRESS NOTES
Primary Care Provider  Maite Montoya DO     Referring Provider  No ref. provider found     Chief Complaint  Follow-up (1 month f/up - post antifungals ) and histoplasmosis    Subjective          Pavan Perales presents to [facility] PULMONARY & CRITICAL CARE MEDICINE  History of Present Illness  Pavan Perales is a 59 y.o. male patient here for management of a histoplasmosis, lung nodule, abnormal PET and tobacco abuse of cigarettes in remission.     Since patient's last office visit, his histoplasma antibodies and Fungitell B-D glucan resulted as positive.  Patient has been on itraconazole for approximately 1 month.  He states that he is feeling better since starting these medications.  I informed patient that typically patients are on these medications for 3 to 6 months.  I will send over 2 months of refills and patient is due to have a repeat CT scan on 10/14/2024.  He has a follow-up appointment on 10/18/2024 and we will determine if treatment will be completed at that time.  Patient did have blood work today prior to his office visit.  I will check his kidney and liver function once they have resulted.  He states that he believes he contracted the fungal infection when he was sick and worked in his barn with hay around bird droppings.  Overall, he is doing well and has no additional concerns at this time.  He is able to perform his ADLs without difficulty.  He is up-to-date with his COVID and flu vaccine.     His history of smoking is   Tobacco Use: High Risk (8/14/2024)    Patient History     Smoking Tobacco Use: Former     Smokeless Tobacco Use: Current     Passive Exposure: Never   .    Review of Systems   Constitutional:  Negative for chills, fatigue, fever, unexpected weight gain and unexpected weight loss.   HENT:  Congestion: Nasal.    Respiratory:  Negative for apnea, cough, shortness of breath and wheezing.         Negative for Hemoptysis     Cardiovascular:  Negative for  chest pain, palpitations and leg swelling.   Skin:         Negative for cyanosis      Sleep: Negative for Excessive daytime sleepiness  Negative for morning headaches  Negative for Snoring    Family History   Problem Relation Age of Onset    Malig Hyperthermia Neg Hx         Social History     Socioeconomic History    Marital status:    Tobacco Use    Smoking status: Former     Types: Cigarettes     Passive exposure: Never    Smokeless tobacco: Current     Types: Snuff    Tobacco comments:     Patient states he did smoke for a few years, unsure of time frame and PPD.      INST PER ANESTHESIA PROTOCOL, LAST CHEWED TOBACCO LAST NIGHT   Vaping Use    Vaping status: Never Used   Substance and Sexual Activity    Alcohol use: Yes     Comment: RARELY    Drug use: Not Currently    Sexual activity: Defer        Past Medical History:   Diagnosis Date    Back pain     Cancer     skin    Hyperlipidemia     Hypertension     Insomnia     Lung nodule     PTSD (post-traumatic stress disorder)     no isssues when waking from anesthesia    Mumtaz Mountain spotted fever     SYMPTOMS OF AND TREATED AS IF HE HAD IT JUNE 2024    Tinnitus         Immunization History   Administered Date(s) Administered    COVID-19 (MODERNA) 1st,2nd,3rd Dose Monovalent 01/07/2021, 02/04/2021    Fluzone (or Fluarix & Flulaval for VFC) >6mos 10/23/2020, 12/20/2023    Influenza Injectable Mdck Pf Quad 11/30/2022    Shingrix 11/30/2022, 07/18/2023         No Known Allergies       Current Outpatient Medications:     albuterol sulfate  (90 Base) MCG/ACT inhaler, Inhale 2 puffs Every 6 (Six) Hours As Needed for Shortness of Air or Wheezing., Disp: 18 g, Rfl: 0    atorvastatin (LIPITOR) 20 MG tablet, Take 1 tablet by mouth Daily., Disp: , Rfl:     itraconazole (SPORANOX) 100 MG capsule, Take 2 capsules by mouth 3 (Three) Times a Day for 3 days, THEN 2 capsules 2 (Two) Times a Day for 27 days., Disp: 126 capsule, Rfl: 0    ketorolac (TORADOL) 10 MG  "tablet, Take 1 tablet by mouth Every 6 (Six) Hours As Needed for Mild Pain., Disp: 20 tablet, Rfl: 0    lidocaine (Lidoderm) 5 %, Place 1 patch on the skin as directed by provider Daily. Remove & Discard patch within 12 hours or as directed by MD, Disp: 15 each, Rfl: 1    pantoprazole (PROTONIX) 40 MG EC tablet, TAKE 1 TABLET BY MOUTH DAILY, Disp: 30 tablet, Rfl: 10    traZODone (DESYREL) 100 MG tablet, Take 3 tablets by mouth Every Night., Disp: , Rfl:     itraconazole (Sporanox) 100 MG capsule, Take 2 capsules by mouth 2 (Two) Times a Day., Disp: 120 capsule, Rfl: 1     Objective   Physical Exam  Constitutional:       General: He is not in acute distress.     Appearance: Normal appearance. He is normal weight.   HENT:      Right Ear: Hearing normal.      Left Ear: Hearing normal.      Nose: No nasal tenderness or congestion.      Mouth/Throat:      Mouth: Mucous membranes are moist. No oral lesions.   Eyes:      Extraocular Movements: Extraocular movements intact.      Pupils: Pupils are equal, round, and reactive to light.   Cardiovascular:      Rate and Rhythm: Normal rate and regular rhythm.      Pulses: Normal pulses.      Heart sounds: Normal heart sounds. No murmur heard.  Pulmonary:      Effort: Pulmonary effort is normal.      Breath sounds: Normal breath sounds. No wheezing, rhonchi or rales.   Musculoskeletal:      Right lower leg: No edema.      Left lower leg: No edema.   Skin:     General: Skin is warm and dry.      Findings: No lesion or rash.   Neurological:      General: No focal deficit present.      Mental Status: He is alert and oriented to person, place, and time.   Psychiatric:         Mood and Affect: Affect normal. Mood is not anxious or depressed.         Vital Signs:   /68 (BP Location: Left arm, Patient Position: Sitting, Cuff Size: Large Adult)   Pulse 68   Temp 97.6 °F (36.4 °C) (Oral)   Resp 16   Ht 177.8 cm (70\")   Wt 93 kg (205 lb)   SpO2 95% Comment: RA  BMI 29.41 kg/m² "        Result Review :   The following data was reviewed by: DEMARCUS Casey on 08/14/2024:  CMP          7/4/2024    04:24 7/9/2024    11:46 7/12/2024    11:04   CMP   Glucose 197  159  97    BUN 11  11  10    Creatinine 0.92  1.05  1.18    EGFR 95.8  81.8  71.1    Sodium 133  135  138    Potassium 3.9  3.8  4.1    Chloride 101  101  104    Calcium 8.4  8.4  9.2    Total Protein 5.9  6.2  6.6    Albumin 3.5  3.6  4.0    Globulin 2.4  2.6  2.6    Total Bilirubin 0.4  0.5  0.4    Alkaline Phosphatase 124  130  135    AST (SGOT) 38  35  44    ALT (SGPT) 69  57  67    Albumin/Globulin Ratio 1.5  1.4  1.5    BUN/Creatinine Ratio 12.0  10.5  8.5    Anion Gap 10.7  10.2  10.6      CBC w/diff          7/4/2024    04:24 7/9/2024    11:46 7/12/2024    11:04   CBC w/Diff   WBC 5.38  3.64  3.75    RBC 4.32  4.66  4.80    Hemoglobin 12.4  13.6  13.9    Hematocrit 38.1  40.1  41.7    MCV 88.2  86.1  86.9    MCH 28.7  29.2  29.0    MCHC 32.5  33.9  33.3    RDW 12.7  13.1  12.4    Platelets 133  152  108    Neutrophil Rel % 91.9  70.4     Immature Granulocyte Rel % 0.4  1.1     Lymphocyte Rel % 5.8  14.3     Monocyte Rel % 1.7  9.6     Eosinophil Rel % 0.0  3.8     Basophil Rel % 0.2  0.8       Data reviewed : Radiologic studies chest CT, PET scan, pulmonary function test 7/2/2024 and my last office note    Procedures        Assessment and Plan    Diagnoses and all orders for this visit:    1. Pulmonary histoplasmosis (Primary)  Comments:  continue itraconazole  Orders:  -     itraconazole (Sporanox) 100 MG capsule; Take 2 capsules by mouth 2 (Two) Times a Day.  Dispense: 120 capsule; Refill: 1    2. Lung nodule  Comments:  repeat CT in October          Follow Up   Return for Next scheduled follow up.  Patient was given instructions and counseling regarding his condition or for health maintenance advice. Please see specific information pulled into the AVS if appropriate.

## 2024-08-20 LAB
ITRACONAZ SERPL-MCNC: 0.9 UG/ML
OH-ITRACONAZ SERPL-MCNC: 1.9 UG/ML

## 2024-09-12 ENCOUNTER — TELEPHONE (OUTPATIENT)
Dept: PULMONOLOGY | Facility: CLINIC | Age: 59
End: 2024-09-12
Payer: OTHER GOVERNMENT

## 2024-09-12 NOTE — TELEPHONE ENCOUNTER
"@Relay     \"LVM for patient to return call. Patient to be on medication for 3 months.\"              LVM for patient to return call.  "

## 2024-09-12 NOTE — TELEPHONE ENCOUNTER
Hub staff attempted to follow warm transfer process and was unsuccessful     Caller: Pavan Perales    Relationship to patient: Self    Best call back number: [phone]    Patient is needing: PATIENT IS CALLING BACK BECAUSE HE IS TAKING DOUBLE OF THIS MEDICATION, SO HE IS WANTING TO KNOW IF HE NEEDS A REFILL OR NEEDS TO TALK TO SOMEONE ABOUT THIS? PLEASE CALL PATIENT

## 2024-09-12 NOTE — TELEPHONE ENCOUNTER
Caller: Pavan Perales    Relationship to patient: Self    Best call back number:     [phone] (Mobile)       Patient is needing: PT WANTS TO SPEAK TO MA ABOUT itraconazole (Sporanox) 100 MG capsule , DOESN'T KNOW IF HE'S SUPPOSED TO KEEP TAKING OR NOT

## 2024-09-12 NOTE — TELEPHONE ENCOUNTER
Spoke to patient, informed him the medication is for 3 months, and to get his refill from [facility].

## 2024-09-12 NOTE — TELEPHONE ENCOUNTER
Caller: Pavan Perales    Relationship to patient: Self    Best call back number: [phone]    Patient is needing: PT CALLED TO SEE STATUS ON THIS

## 2024-09-13 ENCOUNTER — TELEPHONE (OUTPATIENT)
Dept: PULMONOLOGY | Facility: CLINIC | Age: 59
End: 2024-09-13
Payer: OTHER GOVERNMENT

## 2024-09-13 DIAGNOSIS — B39.2 PULMONARY HISTOPLASMOSIS: ICD-10-CM

## 2024-09-13 RX ORDER — ITRACONAZOLE 100 MG/1
200 CAPSULE ORAL 2 TIMES DAILY
Qty: 120 CAPSULE | Refills: 0 | Status: SHIPPED | OUTPATIENT
Start: 2024-09-13

## 2024-09-13 NOTE — TELEPHONE ENCOUNTER
Patient needs his last months dose of itraconazole sent to [facility] on [address]. The original script was sent to [facility], I called and pharmacy didn't have it on file. Patient would just like it sent to a new pharmacy. Please advise, thank you.

## 2024-10-14 ENCOUNTER — HOSPITAL ENCOUNTER (OUTPATIENT)
Dept: CT IMAGING | Facility: HOSPITAL | Age: 59
Discharge: HOME | End: 2024-10-14
Admitting: NURSE PRACTITIONER
Payer: OTHER GOVERNMENT

## 2024-10-14 DIAGNOSIS — R91.1 LUNG NODULE: ICD-10-CM

## 2024-10-14 DIAGNOSIS — R91.8 ABNORMAL CT SCAN OF LUNG: ICD-10-CM

## 2024-10-14 PROCEDURE — 71250 CT THORAX DX C-: CPT

## 2024-10-25 ENCOUNTER — OFFICE VISIT (OUTPATIENT)
Dept: PULMONOLOGY | Facility: CLINIC | Age: 59
End: 2024-10-25
Payer: OTHER GOVERNMENT

## 2024-10-25 VITALS
BODY MASS INDEX: 30.78 KG/M2 | RESPIRATION RATE: 16 BRPM | DIASTOLIC BLOOD PRESSURE: 85 MMHG | TEMPERATURE: 97.6 F | WEIGHT: 215 LBS | OXYGEN SATURATION: 98 % | SYSTOLIC BLOOD PRESSURE: 144 MMHG | HEART RATE: 58 BPM | HEIGHT: 70 IN

## 2024-10-25 DIAGNOSIS — F17.211 NICOTINE DEPENDENCE, CIGARETTES, IN REMISSION: ICD-10-CM

## 2024-10-25 DIAGNOSIS — B39.2 PULMONARY HISTOPLASMOSIS: ICD-10-CM

## 2024-10-25 DIAGNOSIS — R91.1 LUNG NODULE: Primary | ICD-10-CM

## 2024-10-25 NOTE — PROGRESS NOTES
Primary Care Provider  Maite Montoya DO     Referring Provider  No ref. provider found     Chief Complaint  Follow-up (CT F/up - 10/14), Histoplasmosis, and Abnormal Chest X-ray    Subjective          Pavan Perales presents to [facility]  History of Present Illness  Pavan Perales is a 59 y.o. male patient here for management of a histoplasmosis, lung nodule, abnormal PET and tobacco abuse of cigarettes in remission.     Patient states he is doing well since last office visit.  He had a chest CT on 10/14/2024.  This showed decrease in size of the medial right upper lobe nodule now measuring 11 mm x 7 mm.  It was previously 20 mm x 17 mm.  There was a new area of groundglass nodules within the left upper and left lower lobe could represent a mild infectious or inflammatory process.  After speaking with Dr. Tejeda, he does not need further treatment with antifungals at this time.  He is doing well and has no additional concerns at this time.  Will stop itraconazole and repeat patient's chest CT in 6 months.     His history of smoking is   Tobacco Use: High Risk (10/25/2024)    Patient History     Smoking Tobacco Use: Former     Smokeless Tobacco Use: Current     Passive Exposure: Past   .    Review of Systems   Constitutional:  Negative for chills, fatigue, fever, unexpected weight gain and unexpected weight loss.   HENT:  Congestion: Nasal.    Respiratory:  Negative for apnea, cough, shortness of breath and wheezing.         Negative for Hemoptysis     Cardiovascular:  Negative for chest pain, palpitations and leg swelling.   Skin:         Negative for cyanosis      Sleep: Negative for Excessive daytime sleepiness  Negative for morning headaches  Negative for Snoring    Family History   Problem Relation Age of Onset    Malig Hyperthermia Neg Hx         Social History     Socioeconomic History    Marital status:    Tobacco Use    Smoking status:  Former     Types: Cigarettes     Passive exposure: Past    Smokeless tobacco: Current     Types: Snuff    Tobacco comments:     Patient states he did smoke for a few years, unsure of time frame and PPD.      INST PER ANESTHESIA PROTOCOL, LAST CHEWED TOBACCO LAST NIGHT   Vaping Use    Vaping status: Never Used   Substance and Sexual Activity    Alcohol use: Yes     Comment: RARELY    Drug use: Not Currently    Sexual activity: Defer        Past Medical History:   Diagnosis Date    Back pain     Cancer     skin    Hyperlipidemia     Hypertension     Insomnia     Lung nodule     PTSD (post-traumatic stress disorder)     no isssues when waking from anesthesia    Mumtaz Mountain spotted fever     SYMPTOMS OF AND TREATED AS IF HE HAD IT JUNE 2024    Tinnitus         Immunization History   Administered Date(s) Administered    COVID-19 (MODERNA) 1st,2nd,3rd Dose Monovalent 01/07/2021, 02/04/2021    Fluzone (or Fluarix & Flulaval for VFC) >6mos 10/23/2020, 12/20/2023    Influenza Injectable Mdck Pf Quad 11/30/2022    Shingrix 11/30/2022, 07/18/2023         No Known Allergies       Current Outpatient Medications:     albuterol sulfate  (90 Base) MCG/ACT inhaler, Inhale 2 puffs Every 6 (Six) Hours As Needed for Shortness of Air or Wheezing., Disp: 18 g, Rfl: 0    atorvastatin (LIPITOR) 20 MG tablet, Take 1 tablet by mouth Daily., Disp: , Rfl:     itraconazole (Sporanox) 100 MG capsule, Take 2 capsules by mouth 2 (Two) Times a Day., Disp: 120 capsule, Rfl: 0    ketorolac (TORADOL) 10 MG tablet, Take 1 tablet by mouth Every 6 (Six) Hours As Needed for Mild Pain., Disp: 20 tablet, Rfl: 0    lidocaine (Lidoderm) 5 %, Place 1 patch on the skin as directed by provider Daily. Remove & Discard patch within 12 hours or as directed by MD, Disp: 15 each, Rfl: 1    pantoprazole (PROTONIX) 40 MG EC tablet, TAKE 1 TABLET BY MOUTH DAILY, Disp: 30 tablet, Rfl: 10    traZODone (DESYREL) 100 MG tablet, Take 3 tablets by mouth Every  "Night., Disp: , Rfl:      Objective   Physical Exam  Constitutional:       General: He is not in acute distress.     Appearance: Normal appearance. He is normal weight.   HENT:      Right Ear: Hearing normal.      Left Ear: Hearing normal.      Nose: No nasal tenderness or congestion.      Mouth/Throat:      Mouth: Mucous membranes are moist. No oral lesions.   Eyes:      Extraocular Movements: Extraocular movements intact.      Pupils: Pupils are equal, round, and reactive to light.   Cardiovascular:      Rate and Rhythm: Normal rate and regular rhythm.      Pulses: Normal pulses.      Heart sounds: Normal heart sounds. No murmur heard.  Pulmonary:      Effort: Pulmonary effort is normal.      Breath sounds: Normal breath sounds. No wheezing, rhonchi or rales.   Musculoskeletal:      Right lower leg: No edema.      Left lower leg: No edema.   Skin:     General: Skin is warm and dry.      Findings: No lesion or rash.   Neurological:      General: No focal deficit present.      Mental Status: He is alert and oriented to person, place, and time.   Psychiatric:         Mood and Affect: Affect normal. Mood is not anxious or depressed.         Vital Signs:   /85 (BP Location: Right arm, Patient Position: Sitting, Cuff Size: Large Adult)   Pulse 58   Temp 97.6 °F (36.4 °C) (Oral)   Resp 16   Ht 177.8 cm (70\")   Wt 97.5 kg (215 lb)   SpO2 98% Comment: RA  BMI 30.85 kg/m²        Result Review :   The following data was reviewed by: DEMARCUS Casey on 10/25/2024:  CMP          7/9/2024    11:46 7/12/2024    11:04 8/14/2024    14:03   CMP   Glucose 159  97  135    BUN 11  10  11    Creatinine 1.05  1.18  1.08    EGFR 81.8  71.1  79.1    Sodium 135  138  139    Potassium 3.8  4.1  3.8    Chloride 101  104  105    Calcium 8.4  9.2  8.9    Total Protein 6.2  6.6  6.3    Albumin 3.6  4.0  4.1    Globulin 2.6  2.6  2.2    Total Bilirubin 0.5  0.4  0.4    Alkaline Phosphatase 130  135  144    AST (SGOT) 35  44  31 "    ALT (SGPT) 57  67  41    Albumin/Globulin Ratio 1.4  1.5  1.9    BUN/Creatinine Ratio 10.5  8.5  10.2    Anion Gap 10.2  10.6  7.0      CBC w/diff          7/9/2024    11:46 7/12/2024    11:04 8/14/2024    14:03   CBC w/Diff   WBC 3.64  3.75  4.16    RBC 4.66  4.80  4.53    Hemoglobin 13.6  13.9  13.2    Hematocrit 40.1  41.7  39.8    MCV 86.1  86.9  87.9    MCH 29.2  29.0  29.1    MCHC 33.9  33.3  33.2    RDW 13.1  12.4  13.3    Platelets 152  108  122    Neutrophil Rel % 70.4   72.8    Immature Granulocyte Rel % 1.1   0.5    Lymphocyte Rel % 14.3   15.1    Monocyte Rel % 9.6   7.5    Eosinophil Rel % 3.8   3.4    Basophil Rel % 0.8   0.7      Data reviewed : Radiologic studies chest CT 6/24/2024, PET scan 7/8/2024, chest CT 10/14/2024, PFT 7/2/2024 and my last office note    Procedures        Assessment and Plan    Diagnoses and all orders for this visit:    1. Lung nodule (Primary)  Comments:  decreased in size  Orders:  -     CT Chest Without Contrast; Future    2. Pulmonary histoplasmosis  Comments:  completed itraconazole    3. Nicotine dependence, cigarettes, in remission          Follow Up   Return in about 6 months (around 4/25/2025) for Recheck with Manuela beginning of May after CT.  Patient was given instructions and counseling regarding his condition or for health maintenance advice. Please see specific information pulled into the AVS if appropriate.

## 2024-11-11 ENCOUNTER — OFFICE VISIT (OUTPATIENT)
Dept: FAMILY MEDICINE CLINIC | Facility: CLINIC | Age: 59
End: 2024-11-11
Payer: OTHER GOVERNMENT

## 2024-11-11 VITALS
WEIGHT: 224 LBS | SYSTOLIC BLOOD PRESSURE: 162 MMHG | DIASTOLIC BLOOD PRESSURE: 94 MMHG | HEIGHT: 70 IN | OXYGEN SATURATION: 98 % | HEART RATE: 108 BPM | BODY MASS INDEX: 32.07 KG/M2 | TEMPERATURE: 98 F

## 2024-11-11 DIAGNOSIS — M79.602 PAIN AND NUMBNESS OF LEFT UPPER EXTREMITY: ICD-10-CM

## 2024-11-11 DIAGNOSIS — R20.0 PAIN AND NUMBNESS OF LEFT UPPER EXTREMITY: ICD-10-CM

## 2024-11-11 DIAGNOSIS — M54.6 THORACIC SPINE PAIN: ICD-10-CM

## 2024-11-11 DIAGNOSIS — M54.2 NECK PAIN: Primary | ICD-10-CM

## 2024-11-11 PROCEDURE — 99214 OFFICE O/P EST MOD 30 MIN: CPT | Performed by: FAMILY MEDICINE

## 2024-11-11 RX ORDER — ACETAMINOPHEN AND CODEINE PHOSPHATE 300; 30 MG/1; MG/1
1-2 TABLET ORAL 2 TIMES DAILY PRN
Qty: 20 TABLET | Refills: 1 | Status: SHIPPED | OUTPATIENT
Start: 2024-11-11

## 2024-11-11 RX ORDER — METHOCARBAMOL 500 MG/1
500 TABLET, FILM COATED ORAL 2 TIMES DAILY PRN
Qty: 20 TABLET | Refills: 1 | Status: SHIPPED | OUTPATIENT
Start: 2024-11-11

## 2024-11-11 RX ORDER — METHOCARBAMOL 500 MG/1
TABLET, FILM COATED ORAL
COMMUNITY
Start: 2024-10-31 | End: 2024-11-11

## 2024-11-11 NOTE — PROGRESS NOTES
"Chief Complaint    Neck Pain (1 month.  Seen at [facility] 10/31 and had xrays.  Also having pain in arm and left leg)    Subjective      Pavan Perales presents to [facility]    Neck Pain         1.) CERVICAL/LEFT UPPER EXTREMITY PAIN/BACK PAIN : Onset - 1 month ago.  No recent injury.  [facility]  He is awaiting an [facility] per [facility].  He presents reporting significant pain of his neck with radiation to his left upper extremity.  He reports numbness and tingling of his left upper extremity.  He is also reporting thoracic pain, as well as, left lower extremity symptoms.  He notes pain so significant that is affecting his ability to perform his ADLs.  He has been prescribed diclofenac, methocarbamol per urgent care, which provides mild relief.    Objective     Vital Signs:     /94 (BP Location: Right arm)   Pulse 108   Temp 98 °F (36.7 °C) (Temporal)   Ht 177.8 cm (70\")   Wt 102 kg (224 lb)   SpO2 98%   BMI 32.14 kg/m²       Physical Exam  Vitals reviewed.   Constitutional:       General: He is not in acute distress.     Appearance: Normal appearance. He is well-developed.   HENT:      Head: Normocephalic and atraumatic.      Right Ear: Hearing and external ear normal.      Left Ear: Hearing and external ear normal.      Nose: Nose normal.   Eyes:      General: Lids are normal.         Right eye: No discharge.         Left eye: No discharge.      Conjunctiva/sclera: Conjunctivae normal.   Neck:      Comments: Examination of neck significant for tenderness to palpation of lower aspect of trapezius muscle.  No significant decrease in strength noted with testing of bilateral upper extremities.  Pulmonary:      Effort: Pulmonary effort is normal.   Abdominal:      General: There is no distension.   Musculoskeletal:         General: No swelling.      Cervical back: Neck supple.   Skin:     Coloration: Skin " is not jaundiced.      Findings: No erythema.   Neurological:      Mental Status: He is alert. Mental status is at baseline.   Psychiatric:         Mood and Affect: Mood and affect normal.         Thought Content: Thought content normal.     Assessment and Plan     Diagnoses and all orders for this visit:    1. Neck pain (Primary)    2. Pain and numbness of left upper extremity    3. Thoracic spine pain    Other orders  -     diclofenac (VOLTAREN) 50 MG EC tablet; Take 1 tablet by mouth 2 (Two) Times a Day As Needed (NECK PAIN).  Dispense: 30 tablet; Refill: 1  -     acetaminophen-codeine (TYLENOL with CODEINE #3) 300-30 MG per tablet; Take 1-2 tablets by mouth 2 (Two) Times a Day As Needed for Moderate Pain or Severe Pain.  Dispense: 20 tablet; Refill: 1  -     methocarbamol (ROBAXIN) 500 MG tablet; Take 1 tablet by mouth 2 (Two) Times a Day As Needed for Muscle Spasms.  Dispense: 20 tablet; Refill: 1    Trial of medications above.  Patient is requesting that this office orders his MRI, as he believes that we will be able to complete faster.  Advised that clinician will have to check with our referral department regarding insurance.  Trial of acetaminophen with codeine as needed.  Patient has been advised that we will call him tomorrow with updates regarding ordering MRI.    Follow Up : Per discussion with referral dept.    Patient was given instructions and counseling regarding his condition or for health maintenance advice. Please see specific information pulled into the AVS if appropriate.

## 2024-11-12 ENCOUNTER — TELEPHONE (OUTPATIENT)
Dept: FAMILY MEDICINE CLINIC | Facility: CLINIC | Age: 59
End: 2024-11-12
Payer: OTHER GOVERNMENT

## 2024-11-12 DIAGNOSIS — R20.2 PARESTHESIA OF LEFT UPPER EXTREMITY: ICD-10-CM

## 2024-11-12 DIAGNOSIS — G89.29 CHRONIC NECK PAIN: Primary | ICD-10-CM

## 2024-11-12 DIAGNOSIS — M54.2 CHRONIC NECK PAIN: Primary | ICD-10-CM

## 2024-11-12 NOTE — TELEPHONE ENCOUNTER
Yes I spoke with patient and he is willing to go to [facility] for mri c-spine

## 2024-11-12 NOTE — TELEPHONE ENCOUNTER
So [facility] have already ordered this for the patient. It is scheduled for 12/2024. He is requesting that we place an order hoping that it will be completed sooner than December. Is that possible? And will insurance be ok with that? Thanks!

## 2024-11-21 ENCOUNTER — HOSPITAL ENCOUNTER (OUTPATIENT)
Dept: OTHER | Facility: HOSPITAL | Age: 59
Discharge: HOME | End: 2024-11-21

## 2024-11-22 DIAGNOSIS — M50.30 DDD (DEGENERATIVE DISC DISEASE), CERVICAL: ICD-10-CM

## 2024-11-22 DIAGNOSIS — R20.2 PARESTHESIA OF LEFT UPPER EXTREMITY: ICD-10-CM

## 2024-11-22 DIAGNOSIS — G89.29 CHRONIC NECK PAIN: Primary | ICD-10-CM

## 2024-11-22 DIAGNOSIS — M54.2 CHRONIC NECK PAIN: Primary | ICD-10-CM

## 2024-12-18 ENCOUNTER — PATIENT ROUNDING (BHMG ONLY) (OUTPATIENT)
Dept: NEUROSURGERY | Facility: CLINIC | Age: 59
End: 2024-12-18
Payer: OTHER GOVERNMENT

## 2024-12-18 ENCOUNTER — OFFICE VISIT (OUTPATIENT)
Dept: NEUROSURGERY | Facility: CLINIC | Age: 59
End: 2024-12-18
Payer: OTHER GOVERNMENT

## 2024-12-18 VITALS
HEIGHT: 70 IN | HEART RATE: 79 BPM | SYSTOLIC BLOOD PRESSURE: 133 MMHG | BODY MASS INDEX: 32.18 KG/M2 | DIASTOLIC BLOOD PRESSURE: 79 MMHG | WEIGHT: 224.8 LBS

## 2024-12-18 DIAGNOSIS — M50.222 HERNIATED NUCLEUS PULPOSUS, C5-6 RIGHT: ICD-10-CM

## 2024-12-18 DIAGNOSIS — M79.602 LEFT ARM PAIN: ICD-10-CM

## 2024-12-18 DIAGNOSIS — M50.21 HERNIATED NUCLEUS PULPOSUS, C3-4 RIGHT: Primary | ICD-10-CM

## 2024-12-18 DIAGNOSIS — M54.2 CERVICALGIA: ICD-10-CM

## 2024-12-18 DIAGNOSIS — M54.42 CHRONIC MIDLINE LOW BACK PAIN WITH BILATERAL SCIATICA: ICD-10-CM

## 2024-12-18 DIAGNOSIS — Z98.890 HISTORY OF LUMBAR SURGERY: ICD-10-CM

## 2024-12-18 DIAGNOSIS — G89.29 CHRONIC MIDLINE LOW BACK PAIN WITH BILATERAL SCIATICA: ICD-10-CM

## 2024-12-18 DIAGNOSIS — M50.223 HERNIATED NUCLEUS PULPOSUS, C6-7: ICD-10-CM

## 2024-12-18 DIAGNOSIS — M54.41 CHRONIC MIDLINE LOW BACK PAIN WITH BILATERAL SCIATICA: ICD-10-CM

## 2024-12-18 NOTE — PROGRESS NOTES
"Chief Complaint  Establish Care (NEW PATIENT_CHRONIC NECK PAIN, PARESTHESIA OF LEFT UPPER EXTREMITY, DDD CERVICAL_MRI CERVICAL 11-21-24@[facility]//Patient stated he has done physical therapy at [facility] in [place] but denies pain management. ) and Neck Pain (Patient stated the pain starts on both sides and the back of his neck and radiates down into his left arm causing a dull/ aching pain. Patient stated it all started a few months ago and has gotten worse.)    Subjective          Pavan Perales who is a 59 y.o. year old male who presents to [facility] for Evaluation of the Spine.     The patient complains of pain located in the Cervical Spine.  Patients states the pain has been present for 2 months.  The pain came on gradually.  The pain scaled level is 6.  The pain does radiate. Dermatomes are located on left Cervical at: a non-specific dermatome.  The pain is constant and waxing/waning and described as sharp and aching.  The pain is worse in the evening. Patient states Pain Medication makes the pain better.  Patient states nothing worsens the pain.    Associated Symptoms Include:  Headaches.  Denies numbness, tingling or weakness.  Conservative Interventions Include: Pain Medications that were somewhat effective., NSAIDs that were somewhat effective., and Muscle Relaxants that were somewhat effective.    Was this the result of an injury or accident? : No    History of Previous Spinal Surgery?: Yes.  Lumbar, Date 1997 and 2007.     reports that he has quit smoking. His smoking use included cigarettes. He has been exposed to tobacco smoke. His smokeless tobacco use includes snuff.    Review of Systems   Musculoskeletal:  Positive for neck pain.   Neurological:  Positive for numbness and headache.        Objective   Vital Signs:   /79 (BP Location: Right arm, Patient Position: Sitting, Cuff Size: Adult)   Pulse 79   Ht 177.8 cm (70\")   Wt 102 kg (224 lb 12.8 oz)  "  BMI 32.26 kg/m²       Physical Exam  Constitutional:       Appearance: Normal appearance. He is obese.   Neck:      Comments: Pain with ROM  Pulmonary:      Effort: Pulmonary effort is normal.   Musculoskeletal:         General: Tenderness (right occipital area) present.      Comments: Corcoran's negative bilaterally, Tinel's negative bilaterally   Neurological:      General: No focal deficit present.      Mental Status: He is alert and oriented to person, place, and time.      Sensory: No sensory deficit.      Motor: No weakness.      Deep Tendon Reflexes: Reflexes normal.   Psychiatric:         Mood and Affect: Mood normal.         Behavior: Behavior normal.        Neurological Exam  Mental Status  Alert. Oriented to person, place, and time.      Result Review     I have personally interpreted the MRI of cervical spine without contrast from 11/21/2024 which shows multilevel degenerative disc disease.  There is moderate right foraminal narrowing at C3-C4.  There is mild right foraminal narrowing at C5-C6.  There is minimal bilateral foraminal narrowing at C6-C7.     Assessment and Plan    Diagnoses and all orders for this visit:    1. Herniated nucleus pulposus, C3-4 right (Primary)    2. Herniated nucleus pulposus, C5-6 right  -     Ambulatory Referral to Pain Management    3. Herniated nucleus pulposus, C6-7  -     Ambulatory Referral to Pain Management    4. Cervicalgia  -     Ambulatory Referral to Pain Management    5. Left arm pain  -     Ambulatory Referral to Pain Management    6. Chronic midline low back pain with bilateral sciatica  -     Ambulatory Referral to Pain Management    7. History of lumbar surgery  -     Ambulatory Referral to Pain Management    He has neck and left arm pain.    The worse narrowing I on the right at C3-C4, which is at least moderate. There is not any high-grade stenosis on the left side at any level that I could point to as an explanation of the left arm pain.    I do not  expect a surgical approach in the cervical spine to help the left arm or neck pain complaints.    He may benefit from conservative treatment with PT or spinal injections.    I will refer him to pain management to discuss possible injection treatments in the cervical spine.    He mentions pain in the lower back and requests that I include this in the referral as well.    The patient was counseled on basic recommendations for the reduction and prevention of back, neck, or spine pain in association with spinal disorders, including: cessation/avoidance of nicotine use, maintenance of a healthy BMI and weight, focusing on building/maintaining core strength through core exercise, and avoidance of activities which worsen the pain. The patient will monitor for changes in symptoms and notify our clinic of these changes as needed.    Follow Up   Return if symptoms worsen or fail to improve.  Patient was given instructions and counseling regarding his condition or for health maintenance advice. Please see specific information pulled into the AVS if appropriate.

## 2025-01-08 ENCOUNTER — TELEPHONE (OUTPATIENT)
Dept: FAMILY MEDICINE CLINIC | Facility: CLINIC | Age: 60
End: 2025-01-08
Payer: OTHER GOVERNMENT

## 2025-01-08 NOTE — TELEPHONE ENCOUNTER
Caller: Pavan Perales    Relationship: Self    Best call back number: [phone]     What orders are you requesting (i.e. lab or imaging): MRI OF SPINE- BACK PAIN- PER [facility]    In what timeframe would the patient need to come in: ASAP    Where will you receive your lab/imaging services:  FACILITY    Additional notes: PATIENT WENT TO PAIN APPOINTMENT THIS AM AND THEY ADVISED PATIENT TO REACH OUT TO PROVIDER TO SCHEDULE MRI OF ENTIRE SPINE.        CONTACT PATIENT TO ADVISE.

## 2025-01-08 NOTE — TELEPHONE ENCOUNTER
My apologies, but I need clarity. That office will have to provide clarification, for example an office note regarding what they need and why. Insurance will require that. I cannot just placed ordered based on the request of another office. Can we please call them to inquire as to why they are not able to order that imaging. Also the patient had an MRI cervical spine completed in 11/2024. Are they wanting that? Thanks!

## 2025-01-09 NOTE — TELEPHONE ENCOUNTER
Called [facility] pain [facility] spine and left message for a call back regarding patient.

## 2025-01-13 NOTE — TELEPHONE ENCOUNTER
Thank you. Yeah I don't know if it's standard to just tell another office to image an entire spine, which would be 4 separate studies. So it seems like the professional thing to do for them to provide some clarity.    Thank you!

## 2025-01-14 DIAGNOSIS — G89.29 CHRONIC LOW BACK PAIN, UNSPECIFIED BACK PAIN LATERALITY, UNSPECIFIED WHETHER SCIATICA PRESENT: Primary | ICD-10-CM

## 2025-01-14 DIAGNOSIS — M54.50 CHRONIC LOW BACK PAIN, UNSPECIFIED BACK PAIN LATERALITY, UNSPECIFIED WHETHER SCIATICA PRESENT: Primary | ICD-10-CM

## 2025-01-14 DIAGNOSIS — M54.50 LOW BACK PAIN POTENTIALLY ASSOCIATED WITH RADICULOPATHY: ICD-10-CM

## 2025-02-18 ENCOUNTER — HOSPITAL ENCOUNTER (OUTPATIENT)
Dept: MRI IMAGING | Facility: HOSPITAL | Age: 60
Discharge: HOME | End: 2025-02-18
Admitting: FAMILY MEDICINE
Payer: OTHER GOVERNMENT

## 2025-02-18 DIAGNOSIS — M54.50 CHRONIC LOW BACK PAIN, UNSPECIFIED BACK PAIN LATERALITY, UNSPECIFIED WHETHER SCIATICA PRESENT: ICD-10-CM

## 2025-02-18 DIAGNOSIS — G89.29 CHRONIC LOW BACK PAIN, UNSPECIFIED BACK PAIN LATERALITY, UNSPECIFIED WHETHER SCIATICA PRESENT: ICD-10-CM

## 2025-02-18 DIAGNOSIS — M54.50 LOW BACK PAIN POTENTIALLY ASSOCIATED WITH RADICULOPATHY: ICD-10-CM

## 2025-02-18 PROCEDURE — 72148 MRI LUMBAR SPINE W/O DYE: CPT

## 2025-02-19 ENCOUNTER — TELEPHONE (OUTPATIENT)
Dept: PULMONOLOGY | Facility: CLINIC | Age: 60
End: 2025-02-19
Payer: OTHER GOVERNMENT

## 2025-04-08 ENCOUNTER — TELEPHONE (OUTPATIENT)
Dept: FAMILY MEDICINE CLINIC | Facility: CLINIC | Age: 60
End: 2025-04-08

## 2025-04-08 NOTE — TELEPHONE ENCOUNTER
Caller: Pavan Perales    Relationship to patient: Self    Best call back number: [phone]    Patient is needing: PATIENT CALLED IN AND IS REQUESTING A CALL BACK FROM CLINICAL TEAM REGARDING IF HE NEEDS A REFERRAL TO PAIN MANAGEMENT OR TO NEUROLOGY.

## 2025-04-08 NOTE — TELEPHONE ENCOUNTER
Can we please route to the referral dept? We have a referral to pain mgmt entered 11/2024. Thank you!

## 2025-04-23 ENCOUNTER — HOSPITAL ENCOUNTER (OUTPATIENT)
Dept: CT IMAGING | Facility: HOSPITAL | Age: 60
Discharge: HOME | End: 2025-04-23
Admitting: NURSE PRACTITIONER
Payer: OTHER GOVERNMENT

## 2025-04-23 DIAGNOSIS — R91.1 LUNG NODULE: ICD-10-CM

## 2025-04-23 PROCEDURE — 71250 CT THORAX DX C-: CPT

## 2025-04-25 ENCOUNTER — OFFICE VISIT (OUTPATIENT)
Dept: FAMILY MEDICINE CLINIC | Facility: CLINIC | Age: 60
End: 2025-04-25
Payer: OTHER GOVERNMENT

## 2025-04-25 VITALS
DIASTOLIC BLOOD PRESSURE: 82 MMHG | SYSTOLIC BLOOD PRESSURE: 120 MMHG | HEART RATE: 98 BPM | WEIGHT: 203.4 LBS | OXYGEN SATURATION: 99 % | HEIGHT: 70 IN | BODY MASS INDEX: 29.12 KG/M2 | TEMPERATURE: 97.5 F

## 2025-04-25 DIAGNOSIS — R19.7 DIARRHEA OF PRESUMED INFECTIOUS ORIGIN: Primary | ICD-10-CM

## 2025-04-25 LAB
027 TOXIN: ABNORMAL
C DIFF GDH + TOXINS A+B STL QL IA.RAPID: NEGATIVE
C DIFF TOX GENS STL QL NAA+PROBE: POSITIVE

## 2025-04-25 PROCEDURE — 87045 FECES CULTURE AEROBIC BACT: CPT | Performed by: FAMILY MEDICINE

## 2025-04-25 PROCEDURE — 99213 OFFICE O/P EST LOW 20 MIN: CPT | Performed by: FAMILY MEDICINE

## 2025-04-25 PROCEDURE — 87046 STOOL CULTR AEROBIC BACT EA: CPT | Performed by: FAMILY MEDICINE

## 2025-04-25 PROCEDURE — 87449 NOS EACH ORGANISM AG IA: CPT | Performed by: FAMILY MEDICINE

## 2025-04-25 PROCEDURE — 87427 SHIGA-LIKE TOXIN AG IA: CPT | Performed by: FAMILY MEDICINE

## 2025-04-25 PROCEDURE — 87493 C DIFF AMPLIFIED PROBE: CPT | Performed by: FAMILY MEDICINE

## 2025-04-25 RX ORDER — DICYCLOMINE HCL 20 MG
20 TABLET ORAL 4 TIMES DAILY PRN
Qty: 20 TABLET | Refills: 0 | Status: SHIPPED | OUTPATIENT
Start: 2025-04-25

## 2025-04-25 NOTE — PROGRESS NOTES
"Chief Complaint    Abdominal Pain and Diarrhea (Started 3 weeks ago)    Subjective      Pavan Perales presents to [facility]    1.) ABDOMINAL PAIN AND DIARRHEA : Onset - 3 weeks ago. No reported inciting event. Patient reports diffuse, intermittent abdominal cramping. Also reporting diarrhea with ingestions of all type of food. No reports of bloody diarrhea or melena. No reported fevers or chills. No reported constipation. No significant relief of sxs with OTC meds.     Objective     Vital Signs:     /82 (BP Location: Left arm, Patient Position: Sitting)   Pulse 98   Temp 97.5 °F (36.4 °C)   Ht 177.8 cm (70\")   Wt 92.3 kg (203 lb 6.4 oz)   SpO2 99%   BMI 29.18 kg/m²       Physical Exam  Vitals reviewed.   Constitutional:       General: He is not in acute distress.     Appearance: Normal appearance. He is well-developed.   HENT:      Head: Normocephalic and atraumatic.      Right Ear: Hearing and external ear normal.      Left Ear: Hearing and external ear normal.      Nose: Nose normal.   Eyes:      General: Lids are normal.         Right eye: No discharge.         Left eye: No discharge.      Conjunctiva/sclera: Conjunctivae normal.   Pulmonary:      Effort: Pulmonary effort is normal.   Abdominal:      General: There is no distension.   Musculoskeletal:         General: No swelling.      Cervical back: Neck supple.   Skin:     Coloration: Skin is not jaundiced.      Findings: No erythema.   Neurological:      Mental Status: He is alert. Mental status is at baseline.   Psychiatric:         Mood and Affect: Mood and affect normal.         Thought Content: Thought content normal.     Assessment and Plan     Diagnoses and all orders for this visit:    1. Diarrhea of presumed infectious origin (Primary)  -     Clostridioides difficile Toxin, PCR - Stool, Per Rectum; Future  -     Stool Culture (Reference Lab) - Stool, Per Rectum; Future    Other orders  -     " dicyclomine (BENTYL) 20 MG tablet; Take 1 tablet by mouth 4 (Four) Times a Day As Needed for Abdominal Cramping.  Dispense: 20 tablet; Refill: 0    ? Etiology. Vitals stable. Abdominal exam unremarkable for alarming findings. Recommend trial of dicyclomine PRN abdominal cramping. Will complete stool studies as noted. Advised if escalation of sxs during weekend, report to ER. Additional recs per review of stool culture.     Follow Up     Return TBD per cx.    Patient was given instructions and counseling regarding his condition or for health maintenance advice. Please see specific information pulled into the AVS if appropriate.

## 2025-04-27 LAB
BACTERIA SPEC CULT: NORMAL
E COLI SXT STL QL IA: NEGATIVE
SALM + SHIG STL CULT: NORMAL

## 2025-04-28 ENCOUNTER — TELEPHONE (OUTPATIENT)
Dept: FAMILY MEDICINE CLINIC | Facility: CLINIC | Age: 60
End: 2025-04-28
Payer: OTHER GOVERNMENT

## 2025-04-28 RX ORDER — VANCOMYCIN HYDROCHLORIDE 125 MG/1
125 CAPSULE ORAL 4 TIMES DAILY
Qty: 40 CAPSULE | Refills: 0 | Status: SHIPPED | OUTPATIENT
Start: 2025-04-28 | End: 2025-05-08

## 2025-04-28 RX ORDER — VANCOMYCIN HYDROCHLORIDE 125 MG/1
125 CAPSULE ORAL 4 TIMES DAILY
Qty: 40 CAPSULE | Refills: 0 | Status: SHIPPED | OUTPATIENT
Start: 2025-04-28 | End: 2025-04-28 | Stop reason: SDUPTHER

## 2025-04-28 NOTE — TELEPHONE ENCOUNTER
Caller: Pavan Perales    Relationship: Self    Best call back number: [phone]     Requested Prescriptions:   Requested Prescriptions     Pending Prescriptions Disp Refills    vancomycin (VANCOCIN) 125 MG capsule 40 capsule 0     Sig: Take 1 capsule by mouth 4 (Four) Times a Day for 10 days.        Pharmacy where request should be sent: [id number] [address] AT [facility] [phone] [facility] [phone] FX     Last office visit with prescribing clinician: 4/25/2025   Last telemedicine visit with prescribing clinician: Visit date not found   Next office visit with prescribing clinician: Visit date not found     Additional details provided by patient: PATIENT CALLED IN REQUESTING THIS MEDICATION TO BE SENT TO THE [facility] INSTEAD OF THE [place] [facility], PLEASE ADVISE     Does the patient have less than a 3 day supply:  [x] Yes  [x] No    Alonzo Armendariz Rep   04/28/25 14:57 EDT

## 2025-04-28 NOTE — TELEPHONE ENCOUNTER
Resent to Ross Albrecht.  Left message for Ross Arizmendi to cancel the original one sent earlier today.

## 2025-04-29 ENCOUNTER — TELEPHONE (OUTPATIENT)
Dept: FAMILY MEDICINE CLINIC | Facility: CLINIC | Age: 60
End: 2025-04-29
Payer: OTHER GOVERNMENT

## 2025-04-29 LAB
BACTERIA SPEC CULT: NORMAL
BACTERIA SPEC CULT: NORMAL
CAMPYLOBACTER STL CULT: NORMAL
E COLI SXT STL QL IA: NEGATIVE
SALM + SHIG STL CULT: NORMAL

## 2025-04-29 RX ORDER — HYDROCODONE BITARTRATE AND ACETAMINOPHEN 5; 325 MG/1; MG/1
1 TABLET ORAL EVERY 6 HOURS PRN
Qty: 20 TABLET | Refills: 0 | Status: SHIPPED | OUTPATIENT
Start: 2025-04-29

## 2025-04-29 NOTE — TELEPHONE ENCOUNTER
Pavan called stating that his pain has not gone away after recent visit. He states that his medication is not working and his pain is now radiating to his sternum.

## 2025-04-29 NOTE — TELEPHONE ENCOUNTER
Called and spoke w/pt.  He is aware and will call in the morning.    Patient is a 29 year old  at 40.5 weeks GA by LMP consistent with a 7.6 weeks sono who presents to L&D for an elective induction of labor.   DARSHANA:  10/4/20   LMP: 19   Pregnancy course is significant for:  1. Obesity: BMI 38     Obhx: Denies   Pmhx: Denies   Pshx: Deviated septum (2019), nasal polyps removal (2019)   Meds: PNVs   Allergies: NKDA   BMI: 38.79   Ultrasound:    EFW:      GBS: Negative  HIV: NR  RPR: NR  HepB: NR  Rubella: Immune  ABO: A+ Patient is a 29 year old  at 40.5 weeks GA by LMP consistent with a 7.6 weeks sono who presents to L&D for an elective induction of labor. Patient denies vaginal bleeding, contractions and leakage of fluid. She endorses good fetal movement. Denies fevers, chills, nausea and vomiting. No other complaints at this time.   DARSHANA:  10/4/20   LMP: 19   Pregnancy course is significant for:  1. Obesity: BMI 38     Obhx: Denies   Pmhx: Denies   PGYN: -fibroids, + ovarian cysts, denies STD hx, denies abnormal PAP smears   Pshx: Deviated septum (2019), nasal polyps removal (2019)   SH: Denies EtOH, tobacco and illicit drug use during this pregnancy; Feels safe at home   Meds: PNVs   Allergies: NKDA   BMI: 38.79   EFW: 3200g    GBS: Negative  HIV: NR  RPR: NR  HepB: NR  Rubella: Immune  ABO: A+

## 2025-05-08 ENCOUNTER — OFFICE VISIT (OUTPATIENT)
Dept: PULMONOLOGY | Facility: CLINIC | Age: 60
End: 2025-05-08
Payer: OTHER GOVERNMENT

## 2025-05-08 VITALS
RESPIRATION RATE: 16 BRPM | HEART RATE: 65 BPM | DIASTOLIC BLOOD PRESSURE: 84 MMHG | BODY MASS INDEX: 28.63 KG/M2 | OXYGEN SATURATION: 97 % | HEIGHT: 70 IN | WEIGHT: 200 LBS | SYSTOLIC BLOOD PRESSURE: 137 MMHG | TEMPERATURE: 97.6 F

## 2025-05-08 DIAGNOSIS — R91.1 LUNG NODULE: Primary | ICD-10-CM

## 2025-05-08 PROCEDURE — 99213 OFFICE O/P EST LOW 20 MIN: CPT | Performed by: NURSE PRACTITIONER

## 2025-05-08 RX ORDER — PREGABALIN 25 MG/1
CAPSULE ORAL
COMMUNITY
Start: 2025-05-06

## 2025-05-08 NOTE — PROGRESS NOTES
Primary Care Provider  Maite Montoya DO     Referring Provider  Hussain Sandoval MD       Patient or patient representative verbalized consent for the use of Ambient Listening during the visit with  DEMARCUS Casey for chart documentation. 5/8/2025  15:36 EDT    Chief Complaint  Follow-up (6 month f/up - CT 4/23) and Lung Nodule    Subjective          Pavan Perales presents to [facility]  History of Present Illness  Pavan Perales is a 60 y.o. male patient here for management of a histoplasmosis, lung nodule, abnormal PET and tobacco abuse of cigarettes in remission.     History of Present Illness  The patient is a 60-year-old male who presents for evaluation of a lung nodule.    He reports a general sense of well-being with no respiratory distress.  His lung nodules stable on chest CT from 4/23/2025.  We will repeat patient's CT scan in 6 months.  Patient has no respiratory concerns today.       His history of smoking is   Tobacco Use: High Risk (5/8/2025)    Patient History     Smoking Tobacco Use: Former     Smokeless Tobacco Use: Current     Passive Exposure: Past   .    Review of Systems   Constitutional:  Negative for chills, fatigue, fever, unexpected weight gain and unexpected weight loss.   HENT:  Congestion: Nasal.    Respiratory:  Negative for apnea, cough, shortness of breath and wheezing.         Negative for Hemoptysis     Cardiovascular:  Negative for chest pain, palpitations and leg swelling.   Skin:         Negative for cyanosis      Sleep: Negative for Excessive daytime sleepiness  Negative for morning headaches  Negative for Snoring    Family History   Problem Relation Age of Onset    Malig Hyperthermia Neg Hx         Social History     Socioeconomic History    Marital status:    Tobacco Use    Smoking status: Former     Types: Cigarettes     Passive exposure: Past    Smokeless tobacco: Current     Types: Snuff     Tobacco comments:     Patient states he did smoke for a few years, unsure of time frame and PPD.      INST PER ANESTHESIA PROTOCOL, LAST CHEWED TOBACCO LAST NIGHT   Vaping Use    Vaping status: Never Used   Substance and Sexual Activity    Alcohol use: Yes     Comment: RARELY    Drug use: Not Currently    Sexual activity: Defer        Past Medical History:   Diagnosis Date    Back pain     Cancer     skin    Hyperlipidemia     Hypertension     Insomnia     Lung nodule     PTSD (post-traumatic stress disorder)     no isssues when waking from anesthesia    Mumtaz Mountain spotted fever     SYMPTOMS OF AND TREATED AS IF HE HAD IT JUNE 2024    Tinnitus         Immunization History   Administered Date(s) Administered    COVID-19 (MODERNA) 1st,2nd,3rd Dose Monovalent 01/07/2021, 02/04/2021    COVID-19 (PFIZER) Purple Cap Monovalent 11/29/2021    Fluzone (or Fluarix & Flulaval for VFC) >6mos 10/23/2020, 12/20/2023    Influenza Injectable Mdck Pf Quad 11/30/2022    Shingrix 11/30/2022, 07/18/2023    Tdap 07/09/2010, 05/22/2017         Allergies   Allergen Reactions    Thiazide-Type Diuretics Nausea Only and Other (See Comments)          Current Outpatient Medications:     acetaminophen-codeine (TYLENOL with CODEINE #3) 300-30 MG per tablet, Take 1-2 tablets by mouth 2 (Two) Times a Day As Needed for Moderate Pain or Severe Pain., Disp: 20 tablet, Rfl: 1    atorvastatin (LIPITOR) 20 MG tablet, Take 1 tablet by mouth Daily., Disp: , Rfl:     dicyclomine (BENTYL) 20 MG tablet, Take 1 tablet by mouth 4 (Four) Times a Day As Needed for Abdominal Cramping., Disp: 20 tablet, Rfl: 0    HYDROcodone-acetaminophen (NORCO) 5-325 MG per tablet, Take 1 tablet by mouth Every 6 (Six) Hours As Needed for Severe Pain., Disp: 20 tablet, Rfl: 0    lidocaine (Lidoderm) 5 %, Place 1 patch on the skin as directed by provider Daily. Remove & Discard patch within 12 hours or as directed by MD, Disp: 15 each, Rfl: 1    Lyrica 25 MG capsule, Take 1  capsule 3 times a day by oral route for 30 days., Disp: , Rfl:     methocarbamol (ROBAXIN) 500 MG tablet, Take 1 tablet by mouth 2 (Two) Times a Day As Needed for Muscle Spasms., Disp: 20 tablet, Rfl: 1    pantoprazole (PROTONIX) 40 MG EC tablet, TAKE 1 TABLET BY MOUTH DAILY, Disp: 30 tablet, Rfl: 10    traZODone (DESYREL) 100 MG tablet, Take 3 tablets by mouth Every Night., Disp: , Rfl:     vancomycin (VANCOCIN) 125 MG capsule, Take 1 capsule by mouth 4 (Four) Times a Day for 10 days., Disp: 40 capsule, Rfl: 0    itraconazole (Sporanox) 100 MG capsule, Take 2 capsules by mouth 2 (Two) Times a Day. (Patient not taking: Reported on 5/8/2025), Disp: 120 capsule, Rfl: 0     Objective   Physical Exam  Constitutional:       General: He is not in acute distress.     Appearance: Normal appearance. He is normal weight.   HENT:      Right Ear: Hearing normal.      Left Ear: Hearing normal.      Nose: No nasal tenderness or congestion.      Mouth/Throat:      Mouth: Mucous membranes are moist. No oral lesions.   Eyes:      Extraocular Movements: Extraocular movements intact.      Pupils: Pupils are equal, round, and reactive to light.   Cardiovascular:      Rate and Rhythm: Normal rate and regular rhythm.      Pulses: Normal pulses.      Heart sounds: Normal heart sounds. No murmur heard.  Pulmonary:      Effort: Pulmonary effort is normal.      Breath sounds: Normal breath sounds. No wheezing, rhonchi or rales.   Musculoskeletal:      Right lower leg: No edema.      Left lower leg: No edema.   Skin:     General: Skin is warm and dry.      Findings: No lesion or rash.   Neurological:      General: No focal deficit present.      Mental Status: He is alert and oriented to person, place, and time.   Psychiatric:         Mood and Affect: Affect normal. Mood is not anxious or depressed.         Vital Signs:   /84 (BP Location: Right arm, Patient Position: Sitting, Cuff Size: Large Adult)   Pulse 65   Temp 97.6 °F (36.4 °C)  "(Oral)   Resp 16   Ht 177.8 cm (70\")   Wt 90.7 kg (200 lb)   SpO2 97% Comment: RA  BMI 28.70 kg/m²        Result Review :   The following data was reviewed by: DEMARCUS Casey on 05/08/2025:  CMP          7/9/2024    11:46 7/12/2024    11:04 8/14/2024    14:03   CMP   Glucose 159  97  135    BUN 11  10  11    Creatinine 1.05  1.18  1.08    EGFR 81.8  71.1  79.1    Sodium 135  138  139    Potassium 3.8  4.1  3.8    Chloride 101  104  105    Calcium 8.4  9.2  8.9    Total Protein 6.2  6.6  6.3    Albumin 3.6  4.0  4.1    Globulin 2.6  2.6  2.2    Total Bilirubin 0.5  0.4  0.4    Alkaline Phosphatase 130  135  144    AST (SGOT) 35  44  31    ALT (SGPT) 57  67  41    Albumin/Globulin Ratio 1.4  1.5  1.9    BUN/Creatinine Ratio 10.5  8.5  10.2    Anion Gap 10.2  10.6  7.0      CBC w/diff          7/9/2024    11:46 7/12/2024    11:04 8/14/2024    14:03   CBC w/Diff   WBC 3.64  3.75  4.16    RBC 4.66  4.80  4.53    Hemoglobin 13.6  13.9  13.2    Hematocrit 40.1  41.7  39.8    MCV 86.1  86.9  87.9    MCH 29.2  29.0  29.1    MCHC 33.9  33.3  33.2    RDW 13.1  12.4  13.3    Platelets 152  108  122    Neutrophil Rel % 70.4   72.8    Immature Granulocyte Rel % 1.1   0.5    Lymphocyte Rel % 14.3   15.1    Monocyte Rel % 9.6   7.5    Eosinophil Rel % 3.8   3.4    Basophil Rel % 0.8   0.7      Data reviewed : Radiologic studies chest CT 10/14/2024, chest CT 4/23/2025 and my last office note    Procedures        Assessment and Plan    Diagnoses and all orders for this visit:    1. Lung nodule (Primary)  Comments:  repeat CT in 4-6 months  Orders:  -     CT Chest Without Contrast; Future        Assessment & Plan  1. Lung nodule  The CT scan results indicate stability, with no observable changes or enlargement of the previously identified area. This is a common occurrence post-treatment for histoplasmosis. The most recent scan was conducted 6 months ago, and while a 3-month follow-up was suggested, it is deemed unnecessary " at this point due to the reduction in size since initial observation. Biopsy results were unremarkable, except for the presence of fungus. A follow-up CT scan will be scheduled in 6 months to monitor the area for any potential changes. If there are any new developments or changes in symptoms, an earlier appointment can be arranged.          Follow Up   Return in about 6 months (around 11/8/2025) for Recheck with arpit Cartwright.  Patient was given instructions and counseling regarding his condition or for health maintenance advice. Please see specific information pulled into the AVS if appropriate.

## 2025-05-29 ENCOUNTER — APPOINTMENT (OUTPATIENT)
Dept: CT IMAGING | Facility: HOSPITAL | Age: 60
End: 2025-05-29
Payer: OTHER GOVERNMENT

## 2025-05-29 ENCOUNTER — HOSPITAL ENCOUNTER (EMERGENCY)
Facility: HOSPITAL | Age: 60
Discharge: HOME | End: 2025-05-29
Attending: EMERGENCY MEDICINE
Payer: OTHER GOVERNMENT

## 2025-05-29 VITALS
BODY MASS INDEX: 26.67 KG/M2 | WEIGHT: 186.3 LBS | RESPIRATION RATE: 18 BRPM | SYSTOLIC BLOOD PRESSURE: 129 MMHG | DIASTOLIC BLOOD PRESSURE: 86 MMHG | HEART RATE: 74 BPM | HEIGHT: 70 IN | TEMPERATURE: 97.8 F | OXYGEN SATURATION: 98 %

## 2025-05-29 DIAGNOSIS — R19.7 DIARRHEA, UNSPECIFIED TYPE: ICD-10-CM

## 2025-05-29 DIAGNOSIS — K52.9 ENTERITIS: Primary | ICD-10-CM

## 2025-05-29 LAB
027 TOXIN: NORMAL
ALBUMIN SERPL-MCNC: 4.2 G/DL (ref 3.5–5.2)
ALBUMIN/GLOB SERPL: 2 G/DL
ALP SERPL-CCNC: 90 U/L (ref 39–117)
ALT SERPL W P-5'-P-CCNC: 17 U/L (ref 1–41)
ANION GAP SERPL CALCULATED.3IONS-SCNC: 11.9 MMOL/L (ref 5–15)
AST SERPL-CCNC: 17 U/L (ref 1–40)
BASOPHILS # BLD AUTO: 0.01 10*3/MM3 (ref 0–0.2)
BASOPHILS NFR BLD AUTO: 0.2 % (ref 0–1.5)
BILIRUB SERPL-MCNC: 0.6 MG/DL (ref 0–1.2)
BILIRUB UR QL STRIP: NEGATIVE
BUN SERPL-MCNC: 15 MG/DL (ref 8–23)
BUN/CREAT SERPL: 14.6 (ref 7–25)
C DIFF TOX GENS STL QL NAA+PROBE: NEGATIVE
CALCIUM SPEC-SCNC: 8.8 MG/DL (ref 8.6–10.5)
CHLORIDE SERPL-SCNC: 106 MMOL/L (ref 98–107)
CLARITY UR: CLEAR
CO2 SERPL-SCNC: 19.1 MMOL/L (ref 22–29)
COLOR UR: YELLOW
CREAT SERPL-MCNC: 1.03 MG/DL (ref 0.76–1.27)
D-LACTATE SERPL-SCNC: 0.8 MMOL/L (ref 0.5–2)
DEPRECATED RDW RBC AUTO: 44.4 FL (ref 37–54)
EGFRCR SERPLBLD CKD-EPI 2021: 83.2 ML/MIN/1.73
EOSINOPHIL # BLD AUTO: 0.27 10*3/MM3 (ref 0–0.4)
EOSINOPHIL NFR BLD AUTO: 4.7 % (ref 0.3–6.2)
ERYTHROCYTE [DISTWIDTH] IN BLOOD BY AUTOMATED COUNT: 13.2 % (ref 12.3–15.4)
GLOBULIN UR ELPH-MCNC: 2.1 GM/DL
GLUCOSE SERPL-MCNC: 97 MG/DL (ref 65–99)
GLUCOSE UR STRIP-MCNC: NEGATIVE MG/DL
HCT VFR BLD AUTO: 43.5 % (ref 37.5–51)
HGB BLD-MCNC: 14.4 G/DL (ref 13–17.7)
HGB UR QL STRIP.AUTO: NEGATIVE
HOLD SPECIMEN: NORMAL
HOLD SPECIMEN: NORMAL
IMM GRANULOCYTES # BLD AUTO: 0.02 10*3/MM3 (ref 0–0.05)
IMM GRANULOCYTES NFR BLD AUTO: 0.3 % (ref 0–0.5)
KETONES UR QL STRIP: NEGATIVE
LEUKOCYTE ESTERASE UR QL STRIP.AUTO: NEGATIVE
LIPASE SERPL-CCNC: 32 U/L (ref 13–60)
LYMPHOCYTES # BLD AUTO: 0.46 10*3/MM3 (ref 0.7–3.1)
LYMPHOCYTES NFR BLD AUTO: 8 % (ref 19.6–45.3)
MCH RBC QN AUTO: 30.3 PG (ref 26.6–33)
MCHC RBC AUTO-ENTMCNC: 33.1 G/DL (ref 31.5–35.7)
MCV RBC AUTO: 91.4 FL (ref 79–97)
MONOCYTES # BLD AUTO: 0.4 10*3/MM3 (ref 0.1–0.9)
MONOCYTES NFR BLD AUTO: 6.9 % (ref 5–12)
NEUTROPHILS NFR BLD AUTO: 4.6 10*3/MM3 (ref 1.7–7)
NEUTROPHILS NFR BLD AUTO: 79.9 % (ref 42.7–76)
NITRITE UR QL STRIP: NEGATIVE
NRBC BLD AUTO-RTO: 0 /100 WBC (ref 0–0.2)
PH UR STRIP.AUTO: <=5 [PH] (ref 5–8)
PLATELET # BLD AUTO: 143 10*3/MM3 (ref 140–450)
PMV BLD AUTO: 10.1 FL (ref 6–12)
POTASSIUM SERPL-SCNC: 4.6 MMOL/L (ref 3.5–5.2)
PROT SERPL-MCNC: 6.3 G/DL (ref 6–8.5)
PROT UR QL STRIP: NEGATIVE
RBC # BLD AUTO: 4.76 10*6/MM3 (ref 4.14–5.8)
SODIUM SERPL-SCNC: 137 MMOL/L (ref 136–145)
SP GR UR STRIP: >1.03 (ref 1–1.03)
UROBILINOGEN UR QL STRIP: ABNORMAL
WBC NRBC COR # BLD AUTO: 5.76 10*3/MM3 (ref 3.4–10.8)
WHOLE BLOOD HOLD COAG: NORMAL
WHOLE BLOOD HOLD SPECIMEN: NORMAL

## 2025-05-29 PROCEDURE — 87493 C DIFF AMPLIFIED PROBE: CPT | Performed by: EMERGENCY MEDICINE

## 2025-05-29 PROCEDURE — 74177 CT ABD & PELVIS W/CONTRAST: CPT

## 2025-05-29 PROCEDURE — 25010000002 KETOROLAC TROMETHAMINE PER 15 MG: Performed by: EMERGENCY MEDICINE

## 2025-05-29 PROCEDURE — 96374 THER/PROPH/DIAG INJ IV PUSH: CPT

## 2025-05-29 PROCEDURE — 83690 ASSAY OF LIPASE: CPT

## 2025-05-29 PROCEDURE — 81003 URINALYSIS AUTO W/O SCOPE: CPT | Performed by: EMERGENCY MEDICINE

## 2025-05-29 PROCEDURE — 25810000003 SODIUM CHLORIDE 0.9 % SOLUTION: Performed by: EMERGENCY MEDICINE

## 2025-05-29 PROCEDURE — 25510000001 IOPAMIDOL PER 1 ML: Performed by: EMERGENCY MEDICINE

## 2025-05-29 PROCEDURE — 85025 COMPLETE CBC W/AUTO DIFF WBC: CPT

## 2025-05-29 PROCEDURE — 80053 COMPREHEN METABOLIC PANEL: CPT

## 2025-05-29 PROCEDURE — 99285 EMERGENCY DEPT VISIT HI MDM: CPT

## 2025-05-29 PROCEDURE — 83605 ASSAY OF LACTIC ACID: CPT

## 2025-05-29 RX ORDER — KETOROLAC TROMETHAMINE 15 MG/ML
15 INJECTION, SOLUTION INTRAMUSCULAR; INTRAVENOUS ONCE
Status: COMPLETED | OUTPATIENT
Start: 2025-05-29 | End: 2025-05-29

## 2025-05-29 RX ORDER — SODIUM CHLORIDE 0.9 % (FLUSH) 0.9 %
10 SYRINGE (ML) INJECTION AS NEEDED
Status: DISCONTINUED | OUTPATIENT
Start: 2025-05-29 | End: 2025-05-29 | Stop reason: HOSPADM

## 2025-05-29 RX ORDER — IOPAMIDOL 755 MG/ML
100 INJECTION, SOLUTION INTRAVASCULAR
Status: COMPLETED | OUTPATIENT
Start: 2025-05-29 | End: 2025-05-29

## 2025-05-29 RX ORDER — DIPHENOXYLATE HYDROCHLORIDE AND ATROPINE SULFATE 2.5; .025 MG/1; MG/1
1 TABLET ORAL 4 TIMES DAILY PRN
Qty: 12 TABLET | Refills: 0 | Status: SHIPPED | OUTPATIENT
Start: 2025-05-29

## 2025-05-29 RX ORDER — DIPHENOXYLATE HYDROCHLORIDE AND ATROPINE SULFATE 2.5; .025 MG/1; MG/1
1 TABLET ORAL ONCE
Status: COMPLETED | OUTPATIENT
Start: 2025-05-29 | End: 2025-05-29

## 2025-05-29 RX ADMIN — SODIUM CHLORIDE 1000 ML: 9 INJECTION, SOLUTION INTRAVENOUS at 15:27

## 2025-05-29 RX ADMIN — IOPAMIDOL 100 ML: 755 INJECTION, SOLUTION INTRAVENOUS at 15:15

## 2025-05-29 RX ADMIN — DIPHENOXYLATE HYDROCHLORIDE AND ATROPINE SULFATE 1 TABLET: 2.5; .025 TABLET ORAL at 15:29

## 2025-05-29 RX ADMIN — KETOROLAC TROMETHAMINE 15 MG: 15 INJECTION, SOLUTION INTRAMUSCULAR; INTRAVENOUS at 15:29

## 2025-05-29 NOTE — ED PROVIDER NOTES
Time: 1:56 PM EDT  Date of encounter:  5/29/2025  Independent Historian/Clinical History and Information was obtained by:   Patient    History is limited by: N/A    Chief Complaint: Diarrhea and neck pain      History of Present Illness:  Patient is a 60 y.o. year old male who presents to the emergency department for evaluation of diarrhea and neck pain    Patient states that he had approximately 2 days of fairly persistent watery diarrhea.  Is associated with diffuse abdominal cramping.  He denies any nausea or vomiting.  No recent antibiotic exposure.  No sick contacts.  He states he did eat out Thai recently but is uncertain if that is causing his symptoms.  He has not taken any over-the-counter medication for his symptoms to date.    He also notes that he was ran into by a cow the other day and since that time he has had pain over his right posterior neck.  He denies any numbness or weakness of his right upper or left upper extremity.      Patient Care Team  Primary Care Provider: Maite Montoya DO    Past Medical History:     Allergies   Allergen Reactions    Thiazide-Type Diuretics Nausea Only and Other (See Comments)     Past Medical History:   Diagnosis Date    Back pain     Cancer     skin    Hyperlipidemia     Hypertension     Insomnia     Lung nodule     PTSD (post-traumatic stress disorder)     no isssues when waking from anesthesia    [place] spotted fever     SYMPTOMS OF AND TREATED AS IF HE HAD IT JUNE 2024    Tinnitus      Past Surgical History:   Procedure Laterality Date    BACK SURGERY      x2 LUMBAR DISC REMOVED    BRONCHOSCOPY WITH ION ROBOTIC ASSIST Right 7/3/2024    Procedure: BRONCHOSCOPY WITH ION ROBOT, REBUS, EBUS, NEEDLE ASPIRATE, BAL, WASHINGS, BRUSHINGS, BIOPSIES;  Surgeon: Prieto Vogel MD;  Location: [facility] OR;  Service: Robotics - Pulmonary;  Laterality: Right;    COLONOSCOPY N/A 01/03/2023    Procedure: COLONOSCOPY WITH POLYPECTOMY;  Surgeon: Jose Hopson MD;   Location: [facility] ENDOSCOPY;  Service: General;  Laterality: N/A;  COLON POLYP, SUBOPTIMAL PREP    ENDOSCOPY N/A 01/03/2023    Procedure: ESOPHAGOGASTRODUODENOSCOPY WITH BIOPSIES, POLYPECTOMIES;  Surgeon: Jose Hopson MD;  Location: [facility] ENDOSCOPY;  Service: General;  Laterality: N/A;  HIATAL HERNIA, GASTRIC POLYPS    KNEE SURGERY Right     SCOPE ACL MCL    OTHER SURGICAL HISTORY      CANCER REMOVAL FACE    SHOULDER ARTHROSCOPY W/ ROTATOR CUFF REPAIR Right 08/17/2023    Procedure: SHOULDER ARTHROSCOPY WITH SUBCROMIAL DECOMPRESSION AND DISTAL CLAVICULECTOMY, OPEN BICEPS TENODESIS;  Surgeon: Marek Gaviria MD;  Location: [facility] OR [facility];  Service: Orthopedics;  Laterality: Right;     Family History   Problem Relation Age of Onset    [place] Hyperthermia Neg Hx        Home Medications:  Prior to Admission medications    Medication Sig Start Date End Date Taking? Authorizing Provider   atorvastatin (LIPITOR) 20 MG tablet Take 1 tablet by mouth Daily.    ProviderAnila MD   HYDROcodone-acetaminophen (NORCO) 5-325 MG per tablet Take 1 tablet by mouth Every 12 (Twelve) Hours As Needed.    ProviderAnila MD   lidocaine (Lidoderm) 5 % Place 1 patch on the skin as directed by provider Daily. Remove & Discard patch within 12 hours or as directed by MD 11/28/23   Maite Montoya, DO   methocarbamol (ROBAXIN) 500 MG tablet Take 1 tablet by mouth 2 (Two) Times a Day As Needed for Muscle Spasms. 11/11/24   Maite Montoya DO   pantoprazole (PROTONIX) 40 MG EC tablet TAKE 1 TABLET BY MOUTH DAILY 3/14/24   Jose Hopson MD   pregabalin (LYRICA) 25 MG capsule Take 1 capsule by mouth 3 (Three) Times a Day.    ProviderAnila MD   traZODone (DESYREL) 100 MG tablet Take 3 tablets by mouth Every Night.    Emergency, Nurse Epic, RN   Zepbound 2.5 MG/0.5ML solution auto-injector  5/16/25   ProviderAnila MD   acetaminophen-codeine (TYLENOL with CODEINE #3) 300-30 MG per tablet Take 1-2 tablets by mouth 2  (Two) Times a Day As Needed for Moderate Pain or Severe Pain. 11/11/24 5/29/25  Keenan Montoyaol, DO   dicyclomine (BENTYL) 20 MG tablet Take 1 tablet by mouth 4 (Four) Times a Day As Needed for Abdominal Cramping. 4/25/25 5/29/25  Myesha Montoyaebol, DO   HYDROcodone-acetaminophen (NORCO) 5-325 MG per tablet Take 1 tablet by mouth Every 6 (Six) Hours As Needed for Severe Pain. 4/29/25 5/29/25  Myesha Montoyaebol, DO   itraconazole (Sporanox) 100 MG capsule Take 2 capsules by mouth 2 (Two) Times a Day.  Patient not taking: Reported on 5/8/2025 9/13/24 5/29/25  Sidra Higuera APRN   Lyrica 25 MG capsule Take 1 capsule 3 times a day by oral route for 30 days. 5/6/25 5/29/25  Provider, MD Anila        Social History:   Social History     Tobacco Use    Smoking status: Former     Types: Cigarettes     Passive exposure: Past    Smokeless tobacco: Current     Types: Snuff    Tobacco comments:     Patient states he did smoke for a few years, unsure of time frame and PPD.      INST PER ANESTHESIA PROTOCOL, LAST CHEWED TOBACCO LAST NIGHT   Vaping Use    Vaping status: Never Used   Substance Use Topics    Alcohol use: Yes     Comment: RARELY    Drug use: Not Currently         Review of Systems:  Review of Systems   Constitutional:  Negative for chills and fever.   HENT:  Negative for congestion, ear pain and sore throat.    Eyes:  Negative for pain.   Respiratory:  Negative for cough, chest tightness and shortness of breath.    Cardiovascular:  Negative for chest pain.   Gastrointestinal:  Positive for diarrhea. Negative for abdominal pain, nausea and vomiting.   Genitourinary:  Negative for flank pain and hematuria.   Musculoskeletal:  Positive for arthralgias and neck pain. Negative for joint swelling.   Skin:  Negative for pallor.   Neurological:  Negative for seizures and headaches.   All other systems reviewed and are negative.       Physical Exam:  /86   Pulse 74   Temp 97.8 °F (36.6 °C) (Oral)   Resp 18   Ht 177.8  "cm (70\")   Wt 84.5 kg (186 lb 4.8 oz)   SpO2 98%   BMI 26.73 kg/m²     Physical Exam  Vitals and nursing note reviewed.   Constitutional:       General: He is not in acute distress.     Appearance: Normal appearance. He is not toxic-appearing.   HENT:      Head: Normocephalic and atraumatic.      Jaw: There is normal jaw occlusion.   Eyes:      General: Lids are normal.      Extraocular Movements: Extraocular movements intact.      Conjunctiva/sclera: Conjunctivae normal.      Pupils: Pupils are equal, round, and reactive to light.   Neck:      Comments: Mild soft tissue tenderness over the right posterior neck, no point bony tenderness, no crepitus, no step-off  Cardiovascular:      Rate and Rhythm: Normal rate and regular rhythm.      Pulses: Normal pulses.      Heart sounds: Normal heart sounds.   Pulmonary:      Effort: Pulmonary effort is normal. No respiratory distress.      Breath sounds: Normal breath sounds. No wheezing or rhonchi.   Abdominal:      General: Abdomen is flat.      Palpations: Abdomen is soft.      Tenderness: There is no abdominal tenderness. There is no guarding or rebound.      Comments: Mild diffuse abdominal tenderness   Musculoskeletal:         General: Normal range of motion.      Cervical back: Normal range of motion and neck supple. No tenderness.      Right lower leg: No edema.      Left lower leg: No edema.   Skin:     General: Skin is warm and dry.   Neurological:      Mental Status: He is alert and oriented to person, place, and time. Mental status is at baseline.      Comments: Intact motor and sensory of bilateral upper extremities.   Psychiatric:         Mood and Affect: Mood normal.             Medical Decision Making:      Comorbidities that affect care:    Hypertension, hyperlipidemia, PTSD, skin cancer, history of [place] spotted fever    External Notes reviewed:    Previous Clinic Note: Urgent care visit for diarrhea 5/29/2025      The following orders were placed " and all results were independently analyzed by me:  Orders Placed This Encounter   Procedures    Clostridioides difficile Toxin - Stool, Per Rectum    Clostridioides difficile Toxin, PCR - Stool, Per Rectum    CT Abdomen Pelvis With Contrast    Ovid Draw    Comprehensive Metabolic Panel    Lipase    Urinalysis With Microscopic If Indicated (No Culture) - Urine, Clean Catch    Lactic Acid, Plasma    CBC Auto Differential    NPO Diet NPO Type: Strict NPO    Undress & Gown    Patient Isolation Contact Spore    Insert Peripheral IV    Insert Peripheral IV    CBC & Differential    Green Top (Gel)    Lavender Top    Gold Top - SST    Light Blue Top       Medications Given in the Emergency Department:  Medications   sodium chloride 0.9 % flush 10 mL (has no administration in time range)   sodium chloride 0.9 % flush 10 mL (has no administration in time range)   sodium chloride 0.9 % bolus 1,000 mL (0 mL Intravenous Stopped 5/29/25 1557)   ketorolac (TORADOL) injection 15 mg (15 mg Intravenous Given 5/29/25 1529)   diphenoxylate-atropine (LOMOTIL) 2.5-0.025 MG per tablet 1 tablet (1 tablet Oral Given 5/29/25 1529)   iopamidol (ISOVUE-370) 76 % injection 100 mL (100 mL Intravenous Given 5/29/25 1515)        ED Course:         Labs:    Lab Results (last 24 hours)       Procedure Component Value Units Date/Time    CBC & Differential [[id number]]  (Abnormal) Collected: 05/29/25 1237    Specimen: Blood Updated: 05/29/25 1300    Narrative:      The following orders were created for panel order CBC & Differential.  Procedure                               Abnormality         Status                     ---------                               -----------         ------                     CBC Auto Differential[[id number]]        Abnormal            Final result                 Please view results for these tests on the individual orders.    Comprehensive Metabolic Panel [[id number]]  (Abnormal) Collected: 05/29/25 1237    Specimen:  Blood Updated: 05/29/25 1322     Glucose 97 mg/dL      BUN 15.0 mg/dL      Creatinine 1.03 mg/dL      Sodium 137 mmol/L      Potassium 4.6 mmol/L      Chloride 106 mmol/L      CO2 19.1 mmol/L      Calcium 8.8 mg/dL      Total Protein 6.3 g/dL      Albumin 4.2 g/dL      ALT (SGPT) 17 U/L      AST (SGOT) 17 U/L      Alkaline Phosphatase 90 U/L      Total Bilirubin 0.6 mg/dL      Globulin 2.1 gm/dL      A/G Ratio 2.0 g/dL      BUN/Creatinine Ratio 14.6     Anion Gap 11.9 mmol/L      eGFR 83.2 mL/min/1.73     Narrative:      GFR Categories in Chronic Kidney Disease (CKD)              GFR Category          GFR (mL/min/1.73)    Interpretation  G1                    90 or greater        Normal or high (1)  G2                    60-89                Mild decrease (1)  G3a                   45-59                Mild to moderate decrease  G3b                   30-44                Moderate to severe decrease  G4                    15-29                Severe decrease  G5                    14 or less           Kidney failure    (1)In the absence of evidence of kidney disease, neither GFR category G1 or G2 fulfill the criteria for CKD.    eGFR calculation 2021 CKD-EPI creatinine equation, which does not include race as a factor    Lipase [[id number]]  (Normal) Collected: 05/29/25 1237    Specimen: Blood Updated: 05/29/25 1322     Lipase 32 U/L     Lactic Acid, Plasma [[id number]]  (Normal) Collected: 05/29/25 1237    Specimen: Blood Updated: 05/29/25 1315     Lactate 0.8 mmol/L     CBC Auto Differential [[id number]]  (Abnormal) Collected: 05/29/25 1237    Specimen: Blood Updated: 05/29/25 1300     WBC 5.76 10*3/mm3      RBC 4.76 10*6/mm3      Hemoglobin 14.4 g/dL      Hematocrit 43.5 %      MCV 91.4 fL      MCH 30.3 pg      MCHC 33.1 g/dL      RDW 13.2 %      RDW-SD 44.4 fl      MPV 10.1 fL      Platelets 143 10*3/mm3      Neutrophil % 79.9 %      Lymphocyte % 8.0 %      Monocyte % 6.9 %      Eosinophil % 4.7 %      Basophil %  0.2 %      Immature Grans % 0.3 %      Neutrophils, Absolute 4.60 10*3/mm3      Lymphocytes, Absolute 0.46 10*3/mm3      Monocytes, Absolute 0.40 10*3/mm3      Eosinophils, Absolute 0.27 10*3/mm3      Basophils, Absolute 0.01 10*3/mm3      Immature Grans, Absolute 0.02 10*3/mm3      nRBC 0.0 /100 WBC     Urinalysis With Microscopic If Indicated (No Culture) - Urine, Clean Catch [[id number]]  (Abnormal) Collected: 05/29/25 1536    Specimen: Urine, Clean Catch Updated: 05/29/25 1546     Color, UA Yellow     Appearance, UA Clear     pH, UA <=5.0     Specific Gravity, UA >1.030     Glucose, UA Negative     Ketones, UA Negative     Bilirubin, UA Negative     Blood, UA Negative     Protein, UA Negative     Leuk Esterase, UA Negative     Nitrite, UA Negative     Urobilinogen, UA 0.2 E.U./dL    Narrative:      Urine microscopic not indicated.    Clostridioides difficile Toxin - Stool, Per Rectum [[id number]] Collected: 05/29/25 1536    Specimen: Stool from Per Rectum Updated: 05/29/25 1623    Narrative:      The following orders were created for panel order Clostridioides difficile Toxin - Stool, Per Rectum.  Procedure                               Abnormality         Status                     ---------                               -----------         ------                     Clostridioides difficile...[[id number]]                      Final result                 Please view results for these tests on the individual orders.    Clostridioides difficile Toxin, PCR - Stool, Per Rectum [[id number]] Collected: 05/29/25 1536    Specimen: Stool from Per Rectum Updated: 05/29/25 1623     Toxigenic C. difficile by PCR Negative     027 Toxin Presumptive Negative    Narrative:      The result indicates the absence of toxigenic C. difficile from stool specimen.              Imaging:    CT Abdomen Pelvis With Contrast  Result Date: 5/29/2025  CT ABDOMEN PELVIS W CONTRAST Date of Exam: 5/29/2025 3:06 PM EDT Indication: Diffuse abdominal  tenderness, severe diarrhea and abdominal pain. Comparison: CT abdomen and pelvis dated 8/26/2023 Technique: Axial CT images were obtained of the abdomen and pelvis after the uneventful intravenous administration of iodinated contrast. Reconstructed coronal and sagittal images were also obtained. Automated exposure control and iterative construction methods were used. Findings: The heart size is normal. There is no pericardial effusion. The lung bases are clear. The liver is normal in size and contour. There is no focal hepatic lesion. The gallbladder is present without wall thickening. The portal veins and hepatic veins are patent. There is no intrahepatic or extrahepatic biliary ductal dilatation. The spleen is normal in size. The adrenal glands appear normal. There is normal appearance of the pancreas. There is no pancreatic ductal dilatation or peripancreatic edema. The kidneys are symmetric in size and enhancement. There is a 5.2 cm cyst at the anterior inferior aspect of the right kidney. There is no hydronephrosis or hydroureter. The urinary bladder is fluid-filled without wall thickening. The prostate appears normal in size. The stomach and duodenum are normal in caliber and configuration. There are some partially fluid-filled and partially dilated loops of small bowel without evidence of transition point to suggest obstruction. This could relate to mild enteritis. The appendix is visualized and normal within the right mid abdomen. The colon is partially fluid-filled and partially collapsed. There is no abnormal wall thickening to suggest colitis or diverticulitis. The aorta is normal in caliber without aneurysm formation. There is no abdominal or pelvic lymphadenopathy. There is degenerative disc disease at L5-S1.     Impression: 1.Partially fluid-filled and partially dilated loops of small bowel without transition point to suggest obstruction. This could relate to mild enteritis. 2.No evidence of colitis  or diverticulitis. Partially fluid-filled colon compatible with history of diarrhea. 3.Normal appendix. 4.Simple right renal cyst measuring 5.2 cm. Electronically Signed: James Agustin MD  5/29/2025 3:29 PM EDT  Workstation ID: [id number]        Differential Diagnosis and Discussion:    Diarrhea: Differential diagnosis includes but is not limited to malabsorption syndrome, bacterial infection, carcinoid syndrome, pancreatic hypersecretion, viral infection, celiac sprue, Crohn's disease, ulcerative colitis, ischemic colitis, colitis, hypermotility, and irritable bowel syndrome.    PROCEDURES:    Labs were collected in the emergency department and all labs were reviewed and interpreted by me.  CT scan was performed in the emergency department and the CT scan radiology impression was interpreted by me.    No orders to display       Procedures    MDM                     Patient Care Considerations:    NARCOTICS: I considered prescribing opiate pain medication as an outpatient, however no pain control required in the ED.      Consultants/Shared Management Plan:    None    Social Determinants of Health:    Patient is independent, reliable, and has access to care.       Disposition and Care Coordination:    Discharged: The patient is suitable and stable for discharge with no need for consideration of admission.    I have explained the patient´s condition, diagnoses and treatment plan based on the information available to me at this time. I have answered questions and addressed any concerns. The patient has a good  understanding of the patient´s diagnosis, condition, and treatment plan as can be expected at this point. The vital signs have been stable. The patient´s condition is stable and appropriate for discharge from the emergency department.      The patient will pursue further outpatient evaluation with the primary care physician or other designated or consulting physician as outlined in the discharge instructions.  They are agreeable to this plan of care and follow-up instructions have been explained in detail. The patient has received these instructions in written format and has expressed an understanding of the discharge instructions. The patient is aware that any significant change in condition or worsening of symptoms should prompt an immediate return to this or the closest emergency department or call to 911.  I have explained discharge medications and the need for follow up with the patient/caretakers. This was also printed in the discharge instructions. Patient was discharged with the following medications and follow up:      Medication List        New Prescriptions      amoxicillin-clavulanate 875-125 MG per tablet  Commonly known as: AUGMENTIN  Take 1 tablet by mouth 2 (Two) Times a Day.     diphenoxylate-atropine 2.5-0.025 MG per tablet  Commonly known as: LOMOTIL  Take 1 tablet by mouth 4 (Four) Times a Day As Needed for Diarrhea.               Where to Get Your Medications        These medications were sent to [id number] - [place], [place] - [address] AT [facility] [place] - [phone]  - [phone]   [address], [place] [address]      Phone: [phone]   amoxicillin-clavulanate 875-125 MG per tablet  diphenoxylate-atropine 2.5-0.025 MG per tablet      Maite Montoya DO  [address] [place] [address] [phone]    Schedule an appointment as soon as possible for a visit          Final diagnoses:   Enteritis   Diarrhea, unspecified type        ED Disposition       ED Disposition   Discharge    Condition   Stable    Comment   --               This medical record created using voice recognition software.             Chadd Nelson MD  05/29/25 [id number]

## 2025-06-10 ENCOUNTER — OFFICE VISIT (OUTPATIENT)
Dept: FAMILY MEDICINE CLINIC | Facility: CLINIC | Age: 60
End: 2025-06-10
Payer: OTHER GOVERNMENT

## 2025-06-10 VITALS
OXYGEN SATURATION: 98 % | WEIGHT: 196 LBS | SYSTOLIC BLOOD PRESSURE: 126 MMHG | HEART RATE: 88 BPM | BODY MASS INDEX: 28.12 KG/M2 | TEMPERATURE: 98 F | DIASTOLIC BLOOD PRESSURE: 76 MMHG

## 2025-06-10 DIAGNOSIS — R11.2 NAUSEA AND VOMITING, UNSPECIFIED VOMITING TYPE: ICD-10-CM

## 2025-06-10 DIAGNOSIS — R10.84 GENERALIZED ABDOMINAL PAIN: Primary | ICD-10-CM

## 2025-06-10 DIAGNOSIS — R19.7 DIARRHEA, UNSPECIFIED TYPE: ICD-10-CM

## 2025-06-10 PROCEDURE — 96372 THER/PROPH/DIAG INJ SC/IM: CPT | Performed by: FAMILY MEDICINE

## 2025-06-10 PROCEDURE — 99213 OFFICE O/P EST LOW 20 MIN: CPT | Performed by: FAMILY MEDICINE

## 2025-06-10 RX ORDER — KETOROLAC TROMETHAMINE 30 MG/ML
60 INJECTION, SOLUTION INTRAMUSCULAR; INTRAVENOUS ONCE
Status: COMPLETED | OUTPATIENT
Start: 2025-06-10 | End: 2025-06-10

## 2025-06-10 RX ORDER — KETOROLAC TROMETHAMINE 30 MG/ML
60 INJECTION, SOLUTION INTRAMUSCULAR; INTRAVENOUS ONCE
Status: DISCONTINUED | OUTPATIENT
Start: 2025-06-10 | End: 2025-06-10

## 2025-06-10 RX ADMIN — KETOROLAC TROMETHAMINE 60 MG: 30 INJECTION, SOLUTION INTRAMUSCULAR; INTRAVENOUS at 10:17

## 2025-06-10 NOTE — PROGRESS NOTES
Chief Complaint    Diarrhea, Fatigue (States he has been in and out of urgent care for 3 weeks. He says they say its a stomach infection. When he takes antibiotics it helps, but when he finishes the course, sxs comes back), Night Sweats (He says toradol helped at the ER), and Vomiting    Subjective      Pavan Perales presents to [facility]    1.) GI SXS : Onset - several weeks.  Patient has been evaluated in the ER.  Most recent visit completed on 5/29/2025 at [facility].  CT abdomen - ? mild enteritis.  Labs did not reveal any alarming findings.  C. difficile testing negative.  Previous stool testing here in office also negative for any alarming findings.  He reports that he continues to experience diarrhea.  He has been advised of a 'stomach infection' and notes improvement of his sxs with antibiotics, which soon return after abx course if completed.  He notes fatigue.  No reports of bright red blood per rectum or melena.  No hematemesis.  No documented history of colonic pathology.     Objective     Vital Signs:     /76 (BP Location: Right arm, Patient Position: Sitting, Cuff Size: Adult)   Pulse 88   Temp 98 °F (36.7 °C) (Infrared)   Wt 88.9 kg (196 lb)   SpO2 98%   BMI 28.12 kg/m²       Physical Exam  Vitals reviewed.   Constitutional:       General: He is not in acute distress.     Appearance: Normal appearance. He is well-developed.      Comments: Pt lying on bed in room.    HENT:      Head: Normocephalic and atraumatic.      Right Ear: Hearing and external ear normal.      Left Ear: Hearing and external ear normal.      Nose: Nose normal.   Eyes:      General: Lids are normal.         Right eye: No discharge.         Left eye: No discharge.      Conjunctiva/sclera: Conjunctivae normal.   Pulmonary:      Effort: Pulmonary effort is normal.   Abdominal:      General: There is no distension.   Musculoskeletal:         General: No swelling.      Cervical  back: Neck supple.   Skin:     Coloration: Skin is not jaundiced.      Findings: No erythema.   Neurological:      Mental Status: He is alert. Mental status is at baseline.   Psychiatric:         Mood and Affect: Mood and affect normal.         Thought Content: Thought content normal.     Assessment and Plan     Diagnoses and all orders for this visit:    1. Generalized abdominal pain (Primary)  -     ketorolac (TORADOL) injection 60 mg  -     Ambulatory Referral to Gastroenterology    2. Diarrhea, unspecified type  -     Ambulatory Referral to Gastroenterology    3. Nausea and vomiting, unspecified vomiting type  -     Ambulatory Referral to Gastroenterology    Questionable etiology.  Patient has completed a significant workup with no alarming findings.  At this time, it appears that there is not much to offer from a primary care standpoint.  He is requesting a Toradol shot here in office.  Administered as noted.  Recommendation for evaluation per gastroenterology.  Patient is amenable.  Advised to continue with adequate fluids and advance diet as tolerated.  Will await recommendations per GI.     Follow Up     Return if symptoms worsen or fail to improve.    Patient was given instructions and counseling regarding his condition or for health maintenance advice. Please see specific information pulled into the AVS if appropriate.

## 2025-07-23 ENCOUNTER — OFFICE VISIT (OUTPATIENT)
Dept: GASTROENTEROLOGY | Facility: CLINIC | Age: 60
End: 2025-07-23
Payer: OTHER GOVERNMENT

## 2025-07-23 ENCOUNTER — TELEPHONE (OUTPATIENT)
Dept: GASTROENTEROLOGY | Facility: CLINIC | Age: 60
End: 2025-07-23

## 2025-07-23 ENCOUNTER — TELEPHONE (OUTPATIENT)
Dept: GASTROENTEROLOGY | Facility: CLINIC | Age: 60
End: 2025-07-23
Payer: OTHER GOVERNMENT

## 2025-07-23 VITALS
HEIGHT: 70 IN | DIASTOLIC BLOOD PRESSURE: 65 MMHG | BODY MASS INDEX: 28.35 KG/M2 | WEIGHT: 198 LBS | SYSTOLIC BLOOD PRESSURE: 110 MMHG | HEART RATE: 75 BPM

## 2025-07-23 DIAGNOSIS — K22.70 BARRETT'S ESOPHAGUS WITHOUT DYSPLASIA: ICD-10-CM

## 2025-07-23 DIAGNOSIS — Z86.19 HISTORY OF CLOSTRIDIOIDES DIFFICILE INFECTION: Primary | ICD-10-CM

## 2025-07-23 DIAGNOSIS — R19.8 IRREGULAR BOWEL HABITS: ICD-10-CM

## 2025-07-23 RX ORDER — SACCHAROMYCES BOULARDII 250 MG
250 CAPSULE ORAL 2 TIMES DAILY
Qty: 60 CAPSULE | Refills: 5 | Status: SHIPPED | OUTPATIENT
Start: 2025-07-23

## 2025-07-23 RX ORDER — POLYETHYLENE GLYCOL 3350, SODIUM SULFATE, SODIUM CHLORIDE, POTASSIUM CHLORIDE, ASCORBIC ACID, SODIUM ASCORBATE 140-9-5.2G
1 KIT ORAL TAKE AS DIRECTED
Qty: 1 EACH | Refills: 0 | Status: SHIPPED | OUTPATIENT
Start: 2025-07-23

## 2025-07-23 NOTE — TELEPHONE ENCOUNTER
2025    Dear DEMARCUS Casey,      Patient Name: Pavan Perales  : 1965      This patient is waiting to have a Colonoscopy and/or Esophagogastroduodenoscopy which I will perform at [facility] on _________25_________________. Please respond to this request noting your recommendations regarding clearance from a Pulmonary  standpoint.  You may contact our office at [phone] Option 3 with any questions. I appreciate your prompt response in this matter. Please return this form to our office as soon as possible to Sidra LORA MA.    ____ I approve my patient from a Pulmonary  standpoint    ____ I do NOT approve my patient from a Pulmonary  standpoint at this time    Please inform our office if the patient requires additional follow-up from your office prior to scheduled procedure date.      Please specify clearance expiration date:____________________________________      Approving physician name (please print): _____________________________________________      Approving physician signature: ________________________________ Date:________________  Sincerely,  [facility] - Gastroenterology   Dr. Arellano          Please fax approval or denial to our office as soon as possible.

## 2025-07-23 NOTE — TELEPHONE ENCOUNTER
Spoke with patient from the cancellation list to see if he would like a sooner appointment with Gwen Sanders. He has rescheduled his appointment from 10/21/25 to 7/23/25@1:00

## 2025-07-23 NOTE — PROGRESS NOTES
Chief Complaint     Abdominal Pain, Nausea, Vomiting, and Diarrhea    Patient or patient representative verbalized consent for the use of Ambient Listening during the visit with  DEMARCUS Solis for chart documentation. 7/23/2025  13:06 EDT    History of Present Illness     History of Present Illness  The patient presents for evaluation of abdominal pain, diarrhea, nausea, and vomiting.    He experienced several watery bowel movements daily during his C. difficile infection. Symptoms resolved after completing the second course of antibiotics. He also reported abdominal pain but did not notice any blood in his stool. He has been on Zepbound for approximately 45 days, with the last injection administered a month ago. Diarrhea occurred before starting the Zepbound injections. He had a bout of diarrhea and stomach pain yesterday, even though he did not consume anything unusual. These episodes are not frequent. He was treated with vancomycin by his primary care physician for C. difficile, which alleviated his diarrhea. In 05/2025, he visited the ER where he was prescribed antibiotics for persistent diarrhea. He was previously diagnosed with a stomach infection and was not informed about dehydration.    He no longer experiences nausea or vomiting. Occasionally, he wakes up with a burning sensation in his stomach, which is quickly relieved by a liquid medication. He has been taking this medication nightly for the past week but has not needed it in the last few nights. He tries to finish eating by 7 PM and does not consume large amounts of food. He takes trazodone and needs to eat something small, like a spoonful of peanut butter, before taking it.    He has acid reflux and irregular bowel movements, which he believes may be due to medication.     He had bird flu and is under the care of a pulmonologist.    SOCIAL HISTORY  Diet: The patient tries to eat before 7 PM and consumes small amounts of food late at night,  such as a spoonful of peanut butter, especially when taking medications like trazodone and antibiotics.         History      Past Medical History:   Diagnosis Date    Back pain     Cancer     skin    Hyperlipidemia     Hypertension     Insomnia     Lung nodule     PTSD (post-traumatic stress disorder)     no isssues when waking from anesthesia    [place]um[place] Mountain spotted fever     SYMPTOMS OF AND TREATED AS IF HE HAD IT JUNE 2024    Tinnitus        Past Surgical History:   Procedure Laterality Date    BACK SURGERY      x2 LUMBAR DISC REMOVED    BRONCHOSCOPY WITH ION ROBOTIC ASSIST Right 7/3/2024    Procedure: BRONCHOSCOPY WITH ION ROBOT, REBUS, EBUS, NEEDLE ASPIRATE, BAL, WASHINGS, BRUSHINGS, BIOPSIES;  Surgeon: Prieto Vogel MD;  Location: [facility] MAIN OR;  Service: Robotics - Pulmonary;  Laterality: Right;    COLONOSCOPY N/A 01/03/2023    Procedure: COLONOSCOPY WITH POLYPECTOMY;  Surgeon: Jose Hopson MD;  Location: [facility] ENDOSCOPY;  Service: General;  Laterality: N/A;  COLON POLYP, SUBOPTIMAL PREP    ENDOSCOPY N/A 01/03/2023    Procedure: ESOPHAGOGASTRODUODENOSCOPY WITH BIOPSIES, POLYPECTOMIES;  Surgeon: Jose Hopson MD;  Location: [facility] ENDOSCOPY;  Service: General;  Laterality: N/A;  HIATAL HERNIA, GASTRIC POLYPS    KNEE SURGERY Right     SCOPE ACL MCL    OTHER SURGICAL HISTORY      CANCER REMOVAL FACE    SHOULDER ARTHROSCOPY W/ ROTATOR CUFF REPAIR Right 08/17/2023    Procedure: SHOULDER ARTHROSCOPY WITH SUBCROMIAL DECOMPRESSION AND DISTAL CLAVICULECTOMY, OPEN BICEPS TENODESIS;  Surgeon: Marek Gaviria MD;  Location: [facility] OR [facility];  Service: Orthopedics;  Laterality: Right;       Family History   Problem Relation Age of Onset    Malig Hyperthermia Neg Hx         Current Medications        Current Outpatient Medications:     atorvastatin (LIPITOR) 20 MG tablet, Take 1 tablet by mouth Daily., Disp: , Rfl:     HYDROcodone-acetaminophen (NORCO) 5-325 MG per tablet, Take 1 tablet by mouth Every 12  "(Twelve) Hours As Needed., Disp: , Rfl:     lidocaine (Lidoderm) 5 %, Place 1 patch on the skin as directed by provider Daily. Remove & Discard patch within 12 hours or as directed by MD, Disp: 15 each, Rfl: 1    methocarbamol (ROBAXIN) 500 MG tablet, Take 1 tablet by mouth 2 (Two) Times a Day As Needed for Muscle Spasms., Disp: 20 tablet, Rfl: 1    pantoprazole (PROTONIX) 40 MG EC tablet, TAKE 1 TABLET BY MOUTH DAILY, Disp: 30 tablet, Rfl: 10    traZODone (DESYREL) 100 MG tablet, Take 3 tablets by mouth Every Night., Disp: , Rfl:     Zepbound 2.5 MG/0.5ML solution auto-injector, , Disp: , Rfl:     PEG-KCl-NaCl-NaSulf-Na Asc-C (Plenvu) 140 g reconstituted solution solution, Take 140 g by mouth Take As Directed. Attn: Pharmacist: please see notes for coupon code., Disp: 1 each, Rfl: 0    saccharomyces boulardii (Florastor) 250 MG capsule, Take 1 capsule by mouth 2 (Two) Times a Day., Disp: 60 capsule, Rfl: 5     Allergies     Allergies   Allergen Reactions    Thiazide-Type Diuretics Nausea Only and Other (See Comments)       Social History       Social History     Social History Narrative    Not on file       Immunizations     Immunization:  Immunization History   Administered Date(s) Administered    COVID-19 (MODERNA) 1st,2nd,3rd Dose Monovalent 01/07/2021, 02/04/2021    COVID-19 (PFIZER) Purple Cap Monovalent 11/29/2021    Fluzone (or Fluarix & Flulaval for VFC) >6mos 10/23/2020, 12/20/2023    Influenza Injectable Mdck Pf Quad 11/30/2022    Shingrix 11/30/2022, 07/18/2023    Tdap 07/09/2010, 05/22/2017          Objective     Objective     Vital Signs:   /65 (BP Location: Left arm, Patient Position: Sitting, Cuff Size: Adult)   Pulse 75   Ht 177.8 cm (70\")   Wt 89.8 kg (198 lb)   BMI 28.41 kg/m²       Physical Exam    Results      Result Review :   The following data was reviewed by: DEMARCUS Solis on 07/23/2025:    CBC w/diff          8/14/2024    14:03 5/29/2025    12:37   CBC w/Diff   WBC " 4.16  5.76    RBC 4.53  4.76    Hemoglobin 13.2  14.4    Hematocrit 39.8  43.5    MCV 87.9  91.4    MCH 29.1  30.3    MCHC 33.2  33.1    RDW 13.3  13.2    Platelets 122  143    Neutrophil Rel % 72.8  79.9    Immature Granulocyte Rel % 0.5  0.3    Lymphocyte Rel % 15.1  8.0    Monocyte Rel % 7.5  6.9    Eosinophil Rel % 3.4  4.7    Basophil Rel % 0.7  0.2      CMP          8/14/2024    14:03 5/29/2025    12:37   CMP   Glucose 135  97    BUN 11  15.0    Creatinine 1.08  1.03    EGFR 79.1  83.2    Sodium 139  137    Potassium 3.8  4.6    Chloride 105  106    Calcium 8.9  8.8    Total Protein 6.3  6.3    Albumin 4.1  4.2    Globulin 2.2  2.1    Total Bilirubin 0.4  0.6    Alkaline Phosphatase 144  90    AST (SGOT) 31  17    ALT (SGPT) 41  17    Albumin/Globulin Ratio 1.9  2.0    BUN/Creatinine Ratio 10.2  14.6    Anion Gap 7.0  11.9        Lipase   Lipase   Date Value Ref Range Status   05/29/2025 32 13 - 60 U/L Final      C diff   Lab Results   Component Value Date    027TOXIN Presumptive Negative 05/29/2025        Results  Labs   - Stool culture: 04/25/2025, Negative for Salmonella, Campylobacter and E. coli   - C. difficile PCR: 04/25/2025, Positive PCR and negative toxin    Imaging   - CT abdomen and pelvis with contrast: 05/29/2025, Liver is normal in size and contour. No focal hepatic lesion. Normal gallbladder. Normal spleen. 5.2 cm cyst in the right kidney. Stomach and duodenum are normal. Partially fluid filled and partially dilated loops of small bowel without evidence of transition point to suggest obstruction, which could relate to mild enteritis. Normal appendix. Colon is partially fluid filled and partially collapsed. No abnormal wall thickening to suggest colitis or diverticulitis.    Diagnostic Testing   - EGD: 01/03/2023, Medium size hiatal hernia. A few medium sized semipedunculated polyps were found in the gastric fundus and were removed. The exam was otherwise normal.   - Colonoscopy: Small polyp was  found in the sigmoid colon and was completely removed. The colon otherwise appeared normal.   - Sigmoid colon polyp biopsy: Hyperplastic   - Duodenum biopsy: Chronic duodenitis   - Stomach polyps biopsy: Consistent with fundic gland polyps   - GE junction biopsy: Intestinal metaplasia, negative for dysplasia   - Mid esophageal biopsy: Negative for eosinophilic esophagitis           Assessment and Plan        Assessment and Plan    Diagnoses and all orders for this visit:    1. History of Clostridioides difficile infection (Primary)  -     Case Request; Standing  -     Case Request  -     saccharomyces boulardii (Florastor) 250 MG capsule; Take 1 capsule by mouth 2 (Two) Times a Day.  Dispense: 60 capsule; Refill: 5    2. Irregular bowel habits  -     Case Request; Standing  -     Case Request  -     saccharomyces boulardii (Florastor) 250 MG capsule; Take 1 capsule by mouth 2 (Two) Times a Day.  Dispense: 60 capsule; Refill: 5    3. Sesay's esophagus without dysplasia  -     Case Request; Standing  -     Case Request    Other orders  -     Follow Anesthesia Guidelines / Protocol; Future  -     Verify NPO; Standing  -     Verify Bowel Prep Was Successful; Standing  -     Give Tap Water Enema If Bowel Prep Insufficient; Standing  -     Obtain Informed Consent; Standing  -     PEG-KCl-NaCl-NaSulf-Na Asc-C (Plenvu) 140 g reconstituted solution solution; Take 140 g by mouth Take As Directed. Attn: Pharmacist: please see notes for coupon code.  Dispense: 1 each; Refill: 0        Assessment & Plan  1. Abdominal pain:  - CT abdomen and pelvis with contrast showed no significant abnormalities except for a 5.2 cm cyst in the right kidney and partially fluid-filled and dilated loops of small bowel, which could relate to mild enteritis.  - Schedule upper endoscopy for further evaluation.    2. Diarrhea:  - Resolved following treatment with vancomycin for C. difficile.  - Stool culture on 04/25/2025 was negative for  Salmonella, Campylobacter, and E. coli.  - C. difficile test on 05/29/2025 was negative.  - Take Florastor (probiotic) for 30 days to help restore gut francine and prevent recurrence.  - Prescription for Florastor sent to pharmacy.    3. Palacios's esophagus:  - Palacios's esophagus with intestinal metaplasia noted in previous endoscopy.  - Currently on pantoprazole.  - Schedule upper endoscopy to monitor condition and determine if any changes in medication are necessary.          ESOPHAGOGASTRODUODENOSCOPY (N/A), COLONOSCOPY (N/A)  The risk of the endoscopy were discussed in detail. Possible risks/complications, benefits, and alternatives to surgical or invasive procedure have been explained to patient and/or legal guardian; risks include bleeding, infection, and perforation. Patient has been evaluated and can tolerate anesthesia and/or sedation.       Follow Up        Follow Up   Return in about 6 months (around 1/23/2026) for palacios's esophagus.  Patient was given instructions and counseling regarding his condition or for health maintenance advice. Please see specific information pulled into the AVS if appropriate.

## 2025-07-24 ENCOUNTER — PATIENT ROUNDING (BHMG ONLY) (OUTPATIENT)
Dept: GASTROENTEROLOGY | Facility: CLINIC | Age: 60
End: 2025-07-24
Payer: OTHER GOVERNMENT

## 2025-07-24 NOTE — PROGRESS NOTES
A My-Chart message has been sent to the patient for PATIENT ROUNDING with [facility].

## 2025-08-11 ENCOUNTER — TELEPHONE (OUTPATIENT)
Dept: ADMINISTRATIVE | Facility: OTHER | Age: 60
End: 2025-08-11
Payer: OTHER GOVERNMENT

## 2025-08-15 DIAGNOSIS — M79.671 RIGHT FOOT PAIN: Primary | ICD-10-CM

## 2025-08-22 ENCOUNTER — TELEPHONE (OUTPATIENT)
Dept: GASTROENTEROLOGY | Facility: CLINIC | Age: 60
End: 2025-08-22
Payer: OTHER GOVERNMENT

## (undated) DEVICE — REPROCESSING COVER

## (undated) DEVICE — UNDYED BRAIDED (POLYGLACTIN 910), SYNTHETIC ABSORBABLE SUTURE: Brand: COATED VICRYL

## (undated) DEVICE — BIOPSY NEEDLE, 21G: Brand: FLEXISION

## (undated) DEVICE — STERILE POLYISOPRENE POWDER-FREE SURGICAL GLOVES: Brand: PROTEXIS

## (undated) DEVICE — VISION PROBE: Brand: ION

## (undated) DEVICE — Device

## (undated) DEVICE — THE SINGLE USE ETRAP – POLYP TRAP IS USED FOR SUCTION RETRIEVAL OF ENDOSCOPICALLY REMOVED POLYPS.: Brand: ETRAP

## (undated) DEVICE — SUT VIC UD BR COAT 0 CP2 27IN

## (undated) DEVICE — BUR BRL FORMLA 12FLUT 4MM

## (undated) DEVICE — SNAR POLYP CAPTIFLEX XS/OVL 11X2.4MM 240CM 1P/U

## (undated) DEVICE — STPCK 1WY SPINLOCK FML LL NONDEHP LF .20ML

## (undated) DEVICE — CONN JET HYDRA H20 AUXILIARY DISP

## (undated) DEVICE — CATHETER GUIDE

## (undated) DEVICE — SINGLE USE ASPIRATION NEEDLE: Brand: SINGLE USE ASPIRATION NEEDLE

## (undated) DEVICE — [THREADED CANNULA.  DO NOT RESTERILIZE,  DO NOT USE IF PACKAGE IS DAMAGED]: Brand: DRI-LOK

## (undated) DEVICE — SOLIDIFIER LIQLOC PLS 1500CC BT

## (undated) DEVICE — Device: Brand: BALLOON

## (undated) DEVICE — REPROCESSING CONSUMABLES KIT

## (undated) DEVICE — 90-S CRUISE, SUCTION PROBE, NON-BENDABLE, MAX CUT LEVEL 1: Brand: SERFAS ENERGY

## (undated) DEVICE — SHOULDER P.A.D. III: Brand: DEROYAL

## (undated) DEVICE — KT LINEN QUICKSQUITE SAHARA STD DRW/LIFT 60X78IN

## (undated) DEVICE — DRSNG GZ PETROLTM XEROFORM CURAD 1X8IN STRL

## (undated) DEVICE — SINGLE USE BIOPSY VALVE MAJ-210: Brand: SINGLE USE BIOPSY VALVE (STERILE)

## (undated) DEVICE — FMS FLUID MANAGEMENT SYSTEM OUTFLOW TUBING WITHOUT ONE-WAY VALVE (FMS VUE OR FMS DUO PLUS): Brand: FMS

## (undated) DEVICE — TRAP,MUCUS SPECIMEN,40CC: Brand: MEDLINE

## (undated) DEVICE — BLCK/BITE BLOX WO/DENTL/RIM W/STRAP 54F

## (undated) DEVICE — INTENDED FOR TISSUE SEPARATION, AND OTHER PROCEDURES THAT REQUIRE A SHARP SURGICAL BLADE TO PUNCTURE OR CUT.: Brand: BARD-PARKER ® CARBON RIB-BACK BLADES

## (undated) DEVICE — SLV SCD KN/LEN ADJ EXPRSS BLENDED MD 1P/U

## (undated) DEVICE — SINGLE-USE BIOPSY FORCEPS: Brand: RADIAL JAW 4

## (undated) DEVICE — CATHETER: Brand: ION

## (undated) DEVICE — Device: Brand: DEFENDO AIR/WATER/SUCTION AND BIOPSY VALVE

## (undated) DEVICE — GLV SURG SENSICARE SLT PF LF 7 STRL

## (undated) DEVICE — GOWN ISOL OVR/HD TIE THMB/LP/CUF PE XLG BLU

## (undated) DEVICE — PROXIMATE RH ROTATING HEAD SKIN STAPLERS (35 WIDE) CONTAINS 35 STAINLESS STEEL STAPLES: Brand: PROXIMATE

## (undated) DEVICE — DISPOSABLE CYTOLOGY BRUSH: Brand: DISPOSABLE CYTOLOGY BRUSH

## (undated) DEVICE — SLV DISTRACTION STAR VELCRO XL DISP

## (undated) DEVICE — LINER SURG CANSTR SXN S/RIGD 1500CC

## (undated) DEVICE — VISION PROBE ADAPTER AND SUCTION ADAPTER

## (undated) DEVICE — REPROCESSING ACCESSORIES KIT

## (undated) DEVICE — SINGLE USE SUCTION VALVE MAJ-209: Brand: SINGLE USE SUCTION VALVE (STERILE)

## (undated) DEVICE — SOL IRRG H2O PL/BG 1000ML STRL

## (undated) DEVICE — PENCL E/S SMOKEEVAC W/TELESCP CANN

## (undated) DEVICE — GLV SURG SENSICARE W/ALOE PF LF 7 STRL

## (undated) DEVICE — GLV SURG ULTRAFREE MAX LTX PF 8

## (undated) DEVICE — SOL IRR NACL 0.9PCT BT 1000ML

## (undated) DEVICE — FRCP BIOP CAPTURA BRONCH 1.8 110CM BLU

## (undated) DEVICE — SHOULDER ARTHROSCOPY-LF: Brand: MEDLINE INDUSTRIES, INC.

## (undated) DEVICE — FMS FLUID MANAGEMENT SYSTEM INFLOW TUBING (FMS VUE): Brand: FMS

## (undated) DEVICE — SUT ETHLN 3-0 FS118IN 663H

## (undated) DEVICE — SWIVEL CONNECTOR

## (undated) DEVICE — BLD CUT FORMLA AGGR PLS 4.0MM